# Patient Record
Sex: FEMALE | Race: WHITE | NOT HISPANIC OR LATINO | Employment: FULL TIME | URBAN - METROPOLITAN AREA
[De-identification: names, ages, dates, MRNs, and addresses within clinical notes are randomized per-mention and may not be internally consistent; named-entity substitution may affect disease eponyms.]

---

## 2017-04-11 ENCOUNTER — ALLSCRIPTS OFFICE VISIT (OUTPATIENT)
Dept: OTHER | Facility: OTHER | Age: 47
End: 2017-04-11

## 2017-04-11 DIAGNOSIS — Z12.31 ENCOUNTER FOR SCREENING MAMMOGRAM FOR MALIGNANT NEOPLASM OF BREAST: ICD-10-CM

## 2017-04-11 DIAGNOSIS — M22.2X2 PATELLOFEMORAL DISORDER OF LEFT KNEE: ICD-10-CM

## 2017-04-11 DIAGNOSIS — E78.5 HYPERLIPIDEMIA: ICD-10-CM

## 2017-04-11 DIAGNOSIS — M25.562 PAIN IN LEFT KNEE: ICD-10-CM

## 2017-04-27 ENCOUNTER — HOSPITAL ENCOUNTER (OUTPATIENT)
Dept: RADIOLOGY | Facility: OTHER | Age: 47
Discharge: HOME | End: 2017-04-27
Payer: COMMERCIAL

## 2017-04-27 ENCOUNTER — ALLSCRIPTS OFFICE VISIT (OUTPATIENT)
Dept: OTHER | Facility: OTHER | Age: 47
End: 2017-04-27

## 2017-04-27 DIAGNOSIS — M25.562 PAIN IN LEFT KNEE: ICD-10-CM

## 2017-04-27 PROCEDURE — 73562 X-RAY EXAM OF KNEE 3: CPT

## 2017-04-27 PROCEDURE — 73564 X-RAY EXAM KNEE 4 OR MORE: CPT

## 2017-05-15 ENCOUNTER — ALLSCRIPTS OFFICE VISIT (OUTPATIENT)
Dept: OTHER | Facility: OTHER | Age: 47
End: 2017-05-15

## 2017-05-15 DIAGNOSIS — Z12.31 ENCOUNTER FOR SCREENING MAMMOGRAM FOR MALIGNANT NEOPLASM OF BREAST: ICD-10-CM

## 2017-05-15 LAB
BILIRUB UR QL STRIP: NORMAL
CLARITY UR: NORMAL
COLOR UR: YELLOW
GLUCOSE (HISTORICAL): NORMAL
HGB UR QL STRIP.AUTO: NORMAL
KETONES UR STRIP-MCNC: NORMAL MG/DL
LEUKOCYTE ESTERASE UR QL STRIP: NORMAL
NITRITE UR QL STRIP: NORMAL
PH UR STRIP.AUTO: 6 [PH]
PROT UR STRIP-MCNC: NORMAL MG/DL
SP GR UR STRIP.AUTO: 1.01
UROBILINOGEN UR QL STRIP.AUTO: 0.2

## 2017-05-18 LAB
BACTERIA UR QL AUTO: ABNORMAL /HPF
HYALINE CASTS #/AREA URNS LPF: ABNORMAL /LPF
RBC (HISTORICAL): ABNORMAL /HPF
SQUAMOUS EPITHELIAL CELLS (HISTORICAL): ABNORMAL /HPF
WBC # BLD AUTO: ABNORMAL /HPF

## 2017-07-06 ENCOUNTER — HOSPITAL ENCOUNTER (OUTPATIENT)
Dept: MAMMOGRAPHY | Facility: CLINIC | Age: 47
Discharge: HOME | End: 2017-07-06
Payer: COMMERCIAL

## 2017-07-06 ENCOUNTER — TRANSCRIBE ORDERS (OUTPATIENT)
Dept: MAMMOGRAPHY | Facility: CLINIC | Age: 47
End: 2017-07-06

## 2017-07-06 DIAGNOSIS — Z12.31 ENCOUNTER FOR SCREENING MAMMOGRAM FOR MALIGNANT NEOPLASM OF BREAST: ICD-10-CM

## 2017-07-06 PROCEDURE — G0202 SCR MAMMO BI INCL CAD: HCPCS

## 2017-07-06 PROCEDURE — 77063 BREAST TOMOSYNTHESIS BI: CPT

## 2017-12-18 ENCOUNTER — ALLSCRIPTS OFFICE VISIT (OUTPATIENT)
Dept: OTHER | Facility: OTHER | Age: 47
End: 2017-12-18

## 2017-12-19 NOTE — PROGRESS NOTES
Assessment  1  Never a smoker   2  Acid indigestion (536 8) (K30)   3  Abdominal bloating (787 3) (R14 0)    Plan  Abdominal bloating    · Dicyclomine HCl - 20 MG Oral Tablet; TAKE 1 TABLET 4 TIMES DAILY    Discussion/Summary    Abdominal bloating - BRAT diet, increase Nexium to twice daily, restart Probiotic daily, try Bentyl 1 tab 4 x a day as needed  Call if no better  mammo due 07/06/2018PAP due 05/04/2018f/u as needed  Chief Complaint  Pt presents to the office with c/o stomach cramping, bloating, and belching, tried probiotic and BRAT w/o relief  mammo due 07/06/2018PAP due 05/04/2018      History of Present Illness  HPI: End of November has had burping and bloating and one day diarrhea, daughter had same only had diarrhea and vomiting  Daughter got better patient has not  Tried probiotic Align for 2 days with no relief, tried [employer] with no relief  Resumed normal diet, eating less but normal  This is first week of normal bowel movement other wise not right  Still on Nexium 20 mg, denies heart burn but feels like a lump in throat  Feels like if someone pushed on stomach would burp more  Not having a lot of gas  Getting a lot of cramping and pain, makes back hurt  Normal urination  Active Problems  1  Acid indigestion (536 8) (K30)   2  Acute UTI (599 0) (N39 0)   3  Allergic rhinitis (477 9) (J30 9)   4  Anemia (285 9) (D64 9)   5  Anxiety (300 00) (F41 9)   6  Cough (786 2) (R05)   7  Edema (782 3) (R60 9)   8  Elevated blood pressure reading without diagnosis of hypertension (796 2) (R03 0)   9  Encounter for routine gynecological examination (V72 31) (Z01 419)   10  Encounter for screening mammogram for malignant neoplasm of breast (V76 12)  (Z12 31)   11  Esophageal reflux (530 81) (K21 9)   12  Generalized anxiety disorder (300 02) (F41 1)   13  Hyperglycemia (790 29) (R73 9)   14  Hyperlipidemia (272 4) (E78 5)   15  Knee pain, left (719 46) (M25 562)   16   Left knee pain (719 46) (M25 562) 17  Obesity (278 00) (E66 9)   18  Patellofemoral syndrome, left (719 46) (M22 2X2)   19  Prediabetes (790 29) (R73 09)   20  Sinus congestion (478 19) (R09 81)   21  Urinary symptom or sign (788 99) (R39 9)   22  Vitamin D deficiency (268 9) (E55 9)    Past Medical History  1  History of Abnormal weight gain (783 1) (R63 5)   2  History of Acute upper respiratory infection (465 9) (J06 9)   3  History of Acute UTI (599 0) (N39 0)   4  History of Acute vaginitis (616 10) (N76 0)   5  History of Arthralgia (719 40) (M25 50)   6  History of Blepharitis, unspecified laterality   7  History of Bloating (787 3) (R14 0)   8  History of abdominal pain (V13 89) (Z87 898)   9  History of acute pharyngitis (V12 69) (Z87 09)   10  History of acute sinusitis (V12 69) (Z87 09)   11  History of acute sinusitis (V12 69) (Z87 09)   12  History of acute sinusitis (V12 69) (Z87 09)   13  History of conjunctivitis (V12 49) (Z86 69)   14  History of dysfunctional uterine bleeding (V13 29) (Z87 42)   15  History of glossitis (V12 79) (Z87 19)   16  History of low back pain (V13 59) (Z87 39)   17  History of stomatitis (V12 79) (Z87 19)   18  History of tinea corporis (V12 09) (Z86 19)   19  History of urticaria (V13 3) (Z87 2)   20  History of vaginitis (V13 29) (Z87 42)   21  History of viral gastroenteritis (V12 09) (Z86 19)   22  History of viral gastroenteritis (V12 09) (Z86 19)   23  History of viral infection (V12 09) (Z86 19)   24  History of Multiple joint pain (719 49) (M25 50)  Active Problems And Past Medical History Reviewed: The active problems and past medical history were reviewed and updated today  Family History  Mother    1  Family history of Family Health Status Of Mother - Good   2  Denied: Family history of substance abuse   3  Denied: Family history of Mental health problem  Father    4  Family history of Congestive Heart Failure   5  Family history of Coronary Artery Disease (V17 49)   6   Family history of Diabetes Mellitus (V18 0)   7  Denied: Family history of substance abuse   8  Denied: Family history of Mental health problem   9  Family history of Pure Hypercholesterolemia  Family History    10  Family history of malignant neoplasm (V16 9) (Z80 9)  Family History Reviewed: The family history was reviewed and updated today  Social History   · Alcohol Use (History)   · Social   · Caffeine Use   · Nominal   · Denied: History of Drug Use   · Never a smoker   · Uses Safety Equipment   · Smoke Detector   · Uses Safety Equipment - Seatbelts  The social history was reviewed and updated today  The social history was reviewed and is unchanged  Surgical History  1  History of Cholecystectomy Laparoscopic  Surgical History Reviewed: The surgical history was reviewed and updated today  Current Meds   1  [employer] Daily Multiple Oral Tablet; TAKE 1 TABLET DAILY; Therapy: (Recorded:20Yfv5369) to Recorded   2  Iron TABS; TAKE 1 TABLET DAILY AS DIRECTED; Therapy: (Recorded:98Jma2394) to Recorded   3  NexIUM 20 MG Oral Capsule Delayed Release; TAKE 1 CAPSULE ONCE DAILY; Therapy: (Recorded:84Tje6308) to Recorded   4  Sertraline HCl - 50 MG Oral Tablet; take one tablet by mouth every day; Therapy: 60DZT1816 to (Evaluate:21Lue5685)  Requested for: 60KXZ0201; Last Rx:03Dgz1010 Ordered   5  Vitamin D 1000 UNIT Oral Tablet; TAKE 1 TABLET 3 times daily TDD:3000; Therapy: (Recorded:01Vwe1230) to Recorded    The medication list was reviewed and updated today  Allergies  1  Codeine Derivatives   2  Penicillins   3  predniSONE   4  Sulfa Drugs   5   Tetracyclines    Vitals   Recorded: 46NFN9445 12:19PM   Temperature 98 4 F, Oral   Heart Rate 72, L Radial   Pulse Quality Normal, L Radial   Respiration Quality Normal   Respiration 16   Systolic 577, LUE, Sitting   Diastolic 82, LUE, Sitting   Height 5 ft 3 75 in   Weight 204 lb 12 8 oz   BMI Calculated 35 43   BSA Calculated 1 97     Physical Exam   Constitutional  General appearance: No acute distress, well appearing and well nourished  Pulmonary  Respiratory effort: No increased work of breathing or signs of respiratory distress  Auscultation of lungs: Clear to auscultation  Cardiovascular  Palpation of heart: Normal PMI, no thrills  Auscultation of heart: Normal rate and rhythm, normal S1 and S2, without murmurs  Abdomen  Abdomen: Non-tender, no masses  Liver and spleen: No hepatomegaly or splenomegaly  Psychiatric  Orientation to person, place, and time: Normal    Mood and affect: Normal          Results/Data  PHQ-2 Adult Depression Screening 96Quu4340 12:22PM User, [facility]     Test Name Result Flag Reference   PHQ-2 Adult Depression Score 0     Over the last two weeks, how often have you been bothered by any of the following problems?  Little interest or pleasure in doing things: Not at all - 0 Feeling down, depressed, or hopeless: Not at all - 0   PHQ-2 Adult Depression Screening Negative         Signatures   Electronically signed by : Severo Funk, [facility]; Dec 18 2017 12:46PM EST                       (Author)    Electronically signed by : ANGELY Gong ; Dec 18 2017  1:07PM EST                       (Author)

## 2018-01-10 NOTE — MISCELLANEOUS
Message   Recorded as Task   Date: 02/15/2016 08:25 AM, Created By: Kyara Renee   Task Name: Follow Up   Assigned To: Melly Peralta   Regarding Patient: Mark Clemens, Status: In Progress   CommentDes Daniel - 15 Feb 2016 8:25 AM     TASK CREATED  Caller: Self; Other; [phone] (Mobile Phone); [phone] (Work)  pt lm stated she saw Ric Foreman back in dec for irreg bleeding and was told it was poss due to homer yarely- she was to call if she was still having issues- she skipped a cycle in dec and now has been bleeding for 16 day    please advise ( call work)  after 4 please call the [address] - 15 Feb 2016 8:51 AM     TASK IN [facility] [address],[facility]  [address] - 15 Feb 2016 9:00 AM     TASK EDITED  Pt aware you are off today - prefers to wait for your input tomorrow  Her eb 2 mos ago neg  No menses in Dec and now been bleeding off and on for 16 days  She is anemic( per pt) and takes fe  She uses about 3 pads a day  Aware she won't be called till tomorrow   Nabil Crow - 16 Feb 2016 9:49 AM     TASK EDITED  Pt works at [employer]  She works till noon  If you call after noon - call on cell   Melly Peralta - 16 Feb 2016 1:01 PM     TASK EDITED  Pt is bleeding for her 17th day  She states it is red but not heavy  She had an EB that was nl and a nl u/s  She is willing to try progesterone to see if her bleeding will stop  She is aware of her other options  She can make a follow up appt if progesterone is not working for her  She is taking her Fe  Pt would like to see what happens next month          Plan  Dysfunctional uterine bleeding    · Norethindrone Acetate 5 MG Oral Tablet (Aygestin); q 8hr x 3 days, one tab po q 12  hr x 3 days then 1tab po x 3 days    Signatures   Electronically signed by : Laney Libman; Feb 16 2016  1:02PM EST                       (Author)

## 2018-01-12 VITALS
SYSTOLIC BLOOD PRESSURE: 120 MMHG | HEIGHT: 64 IN | RESPIRATION RATE: 16 BRPM | HEART RATE: 64 BPM | DIASTOLIC BLOOD PRESSURE: 78 MMHG | WEIGHT: 203.8 LBS | BODY MASS INDEX: 34.79 KG/M2

## 2018-01-12 NOTE — PROGRESS NOTES
Assessment    1  Acid indigestion (536 8) (K30)   2  Anemia (285 9) (D64 9)   3  Anxiety (300 00) (F41 9)   4  Dysfunctional uterine bleeding (626 8) (N93 8)   5  Elevated blood pressure reading without diagnosis of hypertension (796 2) (R03 0)   6  Prediabetes (790 29) (R73 09)   7  Vitamin D deficiency (268 9) (E55 9)   8  Encounter for routine gynecological examination (V72 31) (Z01 419)    Plan  Anemia    · (1) CBC/PLT/DIFF; Status:Active; Requested RFQ:19HKJ8035; Anxiety    · Sertraline HCl - 50 MG Oral Tablet; take 1 tablet every day  Dysfunctional uterine bleeding    · (1) TSH WITH FT4 REFLEX; Status:Active; Requested for:04May2016;    · (1) TSH WITH FT4 REFLEX; Status:Canceled;   Edema    · Hydrochlorothiazide 12 5 MG Oral Capsule; TAKE 1 CAPSULE BY MOUTH  DAILY AS NEEDED  Prediabetes    · (1) COMPREHENSIVE METABOLIC PANEL; Status:Active; Requested for:04May2016;    · (1) HEMOGLOBIN A1C; Status:Active; Requested KSN:37GAU3723;    · Follow Up in 2 Years Evaluation and Treatment  Follow-up  Status: Complete  Done:  99JKL3603   · Follow-up PRN Evaluation and Treatment  Follow-up  Status: Complete  Done:  02ZML5968  Vitamin D deficiency    · (1) VITAMIN D 25-HYDROXY; Status:Active; Requested for:04May2016;     Discussion/Summary  health maintenance visit Currently, she eats an adequate diet and has an inadequate exercise regimen  the risks and benefits of cervical cancer screening were discussed Pap test with reflex HPV testing was done today Breast cancer screening: the risks and benefits of breast cancer screening were discussed, self breast exam technique was taught, monthly self breast exam was advised and mammogram is current  Colorectal cancer screening: the risks and benefits of colorectal cancer screening were discussed and colorectal cancer screening is not indicated  The risks and benefits of immunizations were discussed   Advice and education were given regarding nutrition, aerobic exercise, weight bearing exercise, weight loss, calcium supplements and vitamin D supplements  Patient discussion: discussed with the patient  1  gyn exam - PAP done today, if normal will repeat in 2 years  2  dysfunctional uterine bleeding - endometrial biopsy normal 12/2015, perimenopausal    3  anxiety - try increasing zoloft to 50 mg once daily  4  anemia - check CBC, not currently on iron    5  vitamin d def - compliance with vitamin d stressed for a few months, then get labs done    6  heart burn - c/w OTC Nexium prn    7  prediabetes - will check A1C, REALLY needs to stick to weight loss program    8  elevated blood pressure - most likely white coat HTN, resume monitoring at home, check 3 x a week, if consistently stays above 140/90 need to follow up here sooner  Encouraged weight loss, healthy diet, regular exercise    mammo due 5/2017    f/u pending labs  f/u as needed       Chief Complaint  Pt presents here for a pap/pe;    pap due 01/14/2016   mammo due 05/03/2017      History of Present Illness  HM, Adult Female: The patient is being seen for a gynecology evaluation  The last health maintenance visit was 2 year(s) ago  General Health: The patient's health since the last visit is described as good  She has regular dental visits  She denies vision problems  She denies hearing loss  Lifestyle:  She consumes a diverse and healthy diet  She has weight concerns  She does not exercise regularly  She does not use tobacco  She consumes alcohol  She denies drug use  Reproductive health: the patient is perimenopausal   she reports normal menses  Menstrual history:  age at menarche was 5  LMP: the last menstrual period was 03/28/2016  Recent menstrual periods: bleeding has been lighter than normal  The cycles are irregular and occur approximately every 60 days  The duration of her recent periods has been irregular and usually last 30 days  she uses contraception   For contraception, she has a partner with a vasectomy  she is sexually active  pregnancy history: G 2P 2  Screening:   HPI: Patient here for PAP  Last July has begun perimenopausal  Period every other month, spotting some months only  Was concerned about this and saw [facility] at [facility] who did a endometrial biopsy and was told she was in perimenopause  Offered OCP or could even consider hysterectomy, patient prefers to deal with it  Has also been more anxious all these changes  DAughter is going to be a senior and looking at college, makes her more anxious also  Tried weight watchers, lost 7 lbs in 3 weeks then gave up  Has no reason for giving up  Monitors BP at home always gets 120/70s  Review of Systems    Constitutional: No fever, no chills, feels well, no tiredness, no recent weight gain or weight loss  Eyes: No complaints of eye pain, no red eyes, no eyesight problems, no discharge, no dry eyes, no itching of eyes  ENT: no complaints of earache, no loss of hearing, no nose bleeds, no nasal discharge, no sore throat, no hoarseness  Cardiovascular: No complaints of slow heart rate, no fast heart rate, no chest pain, no palpitations, no leg claudication, no lower extremity edema  Respiratory: No complaints of shortness of breath, no wheezing, no cough, no SOB on exertion, no orthopnea, no PND  Gastrointestinal: No complaints of abdominal pain, no constipation, no nausea or vomiting, no diarrhea, no bloody stools  Genitourinary: as noted in HPI  Musculoskeletal: No complaints of arthralgias, no myalgias, no joint swelling or stiffness, no limb pain or swelling  Integumentary: No complaints of skin rash or lesions, no itching, no skin wounds, no breast pain or lump  Neurological: No complaints of headache, no confusion, no convulsions, no numbness, no dizziness or fainting, no tingling, no limb weakness, no difficulty walking     Psychiatric: Not suicidal, no sleep disturbance, no anxiety or depression, no change in personality, no emotional problems  Endocrine: No complaints of proptosis, no hot flashes, no muscle weakness, no deepening of the voice, no feelings of weakness  Hematologic/Lymphatic: No complaints of swollen glands, no swollen glands in the neck, does not bleed easily, does not bruise easily  Active Problems    1  Acid indigestion (536 8) (K30)   2  Allergic rhinitis (477 9) (J30 9)   3  Anemia (285 9) (D64 9)   4  Anxiety (300 00) (F41 9)   5  Dysfunctional uterine bleeding (626 8) (N93 8)   6  Edema (782 3) (R60 9)   7  Elevated blood pressure reading without diagnosis of hypertension (796 2) (R03 0)   8  Encounter for routine gynecological examination (V72 31) (Z01 419)   9  Encounter for screening mammogram for malignant neoplasm of breast (V76 12)   (Z12 31)   10  Esophageal reflux (530 81) (K21 9)   11  Generalized anxiety disorder (300 02) (F41 1)   12  Hyperglycemia (790 29) (R73 9)   13  Hyperlipidemia (272 4) (E78 5)   14  Obesity (278 00) (E66 9)   15  Prediabetes (790 29) (R73 09)   16   Vitamin D deficiency (268 9) (E55 9)    Past Medical History    · History of Abnormal weight gain (783 1) (R63 5)   · History of Acute upper respiratory infection (465 9) (J06 9)   · History of Acute UTI (599 0) (N39 0)   · History of Arthralgia (719 40) (M25 50)   · History of Blepharitis, unspecified laterality (373 00) (H01 009)   · History of Bloating (787 3) (R14 0)   · History of Cough (786 2) (R05)   · History of abdominal pain (V13 89) (S43 386)   · History of acute pharyngitis (V12 69) (Z87 09)   · History of acute sinusitis (V12 69) (Z87 09)   · History of acute sinusitis (V12 69) (Z87 09)   · History of acute sinusitis (V12 69) (Z87 09)   · History of conjunctivitis (V12 49) (Z86 69)   · History of glossitis (V12 79) (Z87 19)   · History of low back pain (V13 59) (Z87 39)   · History of stomatitis (V12 79) (Z87 19)   · History of tinea corporis (V12 09) (Z86 19)   · History of urticaria (V13 3) (Z87 2)   · History of vaginitis (V13 29) (Z87 42)   · History of viral gastroenteritis (V12 09) (Z86 19)   · History of viral gastroenteritis (V12 09) (Z86 19)   · History of viral infection (V12 09) (Z86 19)   · History of Multiple joint pain (719 49) (M25 50)   · History of Urinary symptom or sign (788 99) (R39 9)    Surgical History    · History of Cholecystectomy Laparoscopic    Family History  Mother    · Family history of Family Health Status Of Mother - Good  Father    · Family history of Congestive Heart Failure   · Family history of Coronary Artery Disease (V17 49)   · Family history of Diabetes Mellitus (V18 0)   · Family history of Hypercholesterolemia    Social History    · Alcohol Use (History)   · Social   · Caffeine Use   · Nominal   · Denied: History of Drug Use   · Never a smoker   · Uses Safety Equipment   · Smoke Detector   · Uses Safety Equipment - Seatbelts    Current Meds   1  Ferrous Sulfate TABS; Take 1 tablet daily; Therapy: (Recorded:07Mar2014) to Recorded   2  Hydrochlorothiazide 12 5 MG Oral Capsule; TAKE 1 CAPSULE BY MOUTH DAILY AS   NEEDED; Therapy: 79XAB8404 to (Last Rx:02Oct2015)  Requested for: 61Rjc4216 Ordered   3  NexIUM 40 MG Oral Capsule Delayed Release; TAKE 1 CAPSULE DAILY; Therapy: 62AFF4252 to (Evaluate:46Nix8036)  Requested for: 11XSK5717; Last   BC:75UAE9680 Ordered   4  Sertraline HCl - 50 MG Oral Tablet; take 1 tablet every day  Requested for: 23Nov2015; Last Rx:23Nov2015 Ordered   5  Vitamin D 1000 UNIT Oral Tablet; TAKE 1 TABLET 3 times daily TDD:3000; Therapy: (Recorded:07Mar2014) to Recorded    Allergies    1  Codeine Derivatives   2  Penicillins   3  Sulfa Drugs   4   Tetracyclines    Vitals   Recorded: 47VDD9262 12:00PM Recorded: 91SWZ0050 10:27AM   Heart Rate  76   Respiration  16   Systolic 885 690   Diastolic 90 98   Height  5 ft 2 5 in   Weight  203 lb    BMI Calculated  36 54   BSA Calculated  1 93   LMP  47HCZ5942     Physical Exam    Constitutional General appearance: No acute distress, well appearing and well nourished  Neck   Neck: Supple, symmetric, trachea midline, no masses  Thyroid: Normal, no thyromegaly  Pulmonary   Respiratory effort: No increased work of breathing or signs of respiratory distress  Percussion of chest: Normal     Palpation of chest: Normal     Auscultation of lungs: Clear to auscultation  Cardiovascular   Palpation of heart: Normal PMI, no thrills  Auscultation of heart: Normal rate and rhythm, normal S1 and S2, no murmurs  Femoral pulses: 2+ bilaterally  Pedal pulses: 2+ bilaterally  Examination of extremities for edema and/or varicosities: Normal     Chest   Breasts: Normal, no dimpling or skin changes appreciated  Palpation of breasts and axillae: Normal, no masses palpated  Chest: Normal     Abdomen   Abdomen: Non-tender, no masses  Liver and spleen: No hepatomegaly or splenomegaly  Genitourinary   External genitalia and vagina: Normal, no lesions appreciated  Cervix: Normal, no lesions, Pap obtained  Uterus: Normal size, no tenderness, no masses  Adnexa/Parametria: Normal, no masses or tenderness  Lymphatic   Palpation of lymph nodes in neck: No lymphadenopathy  Palpation of lymph nodes in axillae: No lymphadenopathy  Musculoskeletal   Gait and station: Normal     Digits and nails: Normal without clubbing or cyanosis  Joints, bones, and muscles: Normal     Range of motion: Normal     Stability: Normal     Muscle strength/tone: Normal     Skin   Skin and subcutaneous tissue: Normal without rashes or lesions  Palpation of skin and subcutaneous tissue: Normal turgor  Neurologic   Cranial nerves: Cranial nerves II-XII intact  Reflexes: 2+ and symmetric  Psychiatric   Judgment and insight: Normal     Mood and affect: Normal        Health Management  Encounter for routine gynecological examination   (every) THINPREP PAP RFX HR HPV; every 2 years;  Last 66TJD8674; Next Due:  47KDT5004; Overdue  Encounter for screening mammogram for malignant neoplasm of breast   Digital Bilateral Screening Mammogram With CAD; every 1 year; Last 03DWD0452; Next  Due: 10SOA1081;  Overdue    Signatures   Electronically signed by : Taras Nino, [facility]; May  4 2016 12:01PM EST                       (Author)    Electronically signed by : ANGELY Mercado ; May  4 2016 12:36PM EST                       (Author)

## 2018-01-14 VITALS
WEIGHT: 204.38 LBS | BODY MASS INDEX: 35.08 KG/M2 | SYSTOLIC BLOOD PRESSURE: 172 MMHG | HEART RATE: 94 BPM | DIASTOLIC BLOOD PRESSURE: 105 MMHG

## 2018-01-14 VITALS
HEIGHT: 64 IN | BODY MASS INDEX: 35.17 KG/M2 | SYSTOLIC BLOOD PRESSURE: 132 MMHG | HEART RATE: 84 BPM | WEIGHT: 206 LBS | RESPIRATION RATE: 18 BRPM | DIASTOLIC BLOOD PRESSURE: 84 MMHG | TEMPERATURE: 97.7 F

## 2018-01-15 NOTE — MISCELLANEOUS
Message   Recorded as Task   Date: 06/06/2016 08:53 AM, Created By: Lilly Martinez   Task Name: Medical Complaint Callback   Assigned To: Brijesh Neri   Regarding Patient: Juany Aura, Status: In Progress   Comment:    Sharon Perez - 06 Jun 2016 8:53 AM     TASK CREATED  Caller: Self; Medical Complaint; [phone] (Work)  pt needs to discuss her treatment with the Norenthindrone 5 mg ordered by Elena Tolliver  she is [phone]  [facility] - 06 Jun 2016 9:38 AM     TASK IN PROGRESS   Errol Rodrigueznadia - 06 Jun 2016 10:01 AM     TASK EDITED  lmtcb  In Feb, pt was put on norethindrone, 5, tid x 3, bid x 3 and 1 od x3  Errol Toledo - 06 Jun 2016 10:36 AM     TASK EDITED  Pt never took the norethindrone in Feb  Bleeding stopped on its own  Pt began bleeding again few weeks ago so filled rx and took it tid x 3 and bid x 3 and then just stopped it about the 25th  Pt unaware bleeding would restart ff norethindrone  She began bleeding Sat - using about 5 pads a day  Her normal menses lasts about 8 days so if pt does not stop bleeding by this Sat - to cb  Active Problems    1  Acid indigestion (536 8) (K30)   2  Allergic rhinitis (477 9) (J30 9)   3  Anemia (285 9) (D64 9)   4  Anxiety (300 00) (F41 9)   5  Dysfunctional uterine bleeding (626 8) (N93 8)   6  Edema (782 3) (R60 9)   7  Elevated blood pressure reading without diagnosis of hypertension (796 2) (R03 0)   8  Encounter for routine gynecological examination (V72 31) (Z01 419)   9  Encounter for screening mammogram for malignant neoplasm of breast (V76 12)   (Z12 31)   10  Esophageal reflux (530 81) (K21 9)   11  Generalized anxiety disorder (300 02) (F41 1)   12  Hyperglycemia (790 29) (R73 9)   13  Hyperlipidemia (272 4) (E78 5)   14  Obesity (278 00) (E66 9)   15  Prediabetes (790 29) (R73 09)   16  Vitamin D deficiency (268 9) (E55 9)    Current Meds   1  Ferrous Sulfate TABS; Take 1 tablet daily; Therapy: (Recorded:07Mar2014) to Recorded   2  Hydrochlorothiazide 12 5 MG Oral Capsule; TAKE 1 CAPSULE BY MOUTH DAILY AS   NEEDED; Therapy: 82EMA8304 to (Last FU:12GSF5996)  Requested for: 35QCC5099 Ordered   3  NexIUM 40 MG Oral Capsule Delayed Release (Esomeprazole Magnesium); TAKE 1   CAPSULE DAILY; Therapy: 87XQN6375 to (Evaluate:72Ake5671)  Requested for: 72VDI1323; Last   PB:62LNA3282 Ordered   4  Sertraline HCl - 50 MG Oral Tablet; take 1 tablet every day  Requested for: 76AYP4650;   Last CO:40UHE0100 Ordered   5  Vitamin D 1000 UNIT Oral Tablet; TAKE 1 TABLET 3 times daily TDD:3000; Therapy: (Recorded:07Mar2014) to Recorded    Allergies    1  Codeine Derivatives   2  Penicillins   3  Sulfa Drugs   4  Tetracyclines    Signatures   Electronically signed by :  Donn Houser, ; Jun 6 2016 10:36AM EST                       (Author)

## 2018-01-23 VITALS
BODY MASS INDEX: 34.97 KG/M2 | WEIGHT: 204.8 LBS | SYSTOLIC BLOOD PRESSURE: 138 MMHG | HEART RATE: 72 BPM | DIASTOLIC BLOOD PRESSURE: 82 MMHG | HEIGHT: 64 IN | TEMPERATURE: 98.4 F | RESPIRATION RATE: 16 BRPM

## 2018-02-01 ENCOUNTER — OFFICE VISIT (OUTPATIENT)
Dept: FAMILY MEDICINE CLINIC | Facility: CLINIC | Age: 48
End: 2018-02-01
Payer: COMMERCIAL

## 2018-02-01 VITALS
DIASTOLIC BLOOD PRESSURE: 80 MMHG | WEIGHT: 210 LBS | TEMPERATURE: 97.8 F | SYSTOLIC BLOOD PRESSURE: 120 MMHG | HEART RATE: 72 BPM | BODY MASS INDEX: 36.33 KG/M2 | RESPIRATION RATE: 16 BRPM

## 2018-02-01 DIAGNOSIS — R09.81 SINUS CONGESTION: Primary | ICD-10-CM

## 2018-02-01 PROCEDURE — 99214 OFFICE O/P EST MOD 30 MIN: CPT | Performed by: FAMILY MEDICINE

## 2018-02-01 RX ORDER — AZITHROMYCIN 250 MG/1
TABLET, FILM COATED ORAL
Qty: 6 TABLET | Refills: 0 | Status: SHIPPED | OUTPATIENT
Start: 2018-02-01 | End: 2018-02-06

## 2018-02-01 RX ORDER — NITROFURANTOIN 25; 75 MG/1; MG/1
1 CAPSULE ORAL EVERY 12 HOURS
COMMUNITY
Start: 2015-02-27 | End: 2018-09-20 | Stop reason: ALTCHOICE

## 2018-02-01 RX ORDER — DICYCLOMINE HCL 20 MG
1 TABLET ORAL 4 TIMES DAILY
COMMUNITY
Start: 2017-12-18 | End: 2018-09-20 | Stop reason: ALTCHOICE

## 2018-02-01 RX ORDER — PNV NO.95/FERROUS FUM/FOLIC AC 28MG-0.8MG
1 TABLET ORAL DAILY
COMMUNITY
End: 2018-09-20 | Stop reason: ALTCHOICE

## 2018-02-01 NOTE — PATIENT INSTRUCTIONS
Sinus congestion  For patient's sinus congestion because she cannot take prednisone without having severe side effects and because Synthroid why send seems to work every year we will give her that especially since she is going away  If this does not work we will need to do imaging study such as a sinus x-ray before doing anything else    In the meantime I still would like her to use the Neti pot followed by the Flonase daily until she is clear

## 2018-02-01 NOTE — ASSESSMENT & PLAN NOTE
For patient's sinus congestion because she cannot take prednisone without having severe side effects and because Synthroid why send seems to work every year we will give her that especially since she is going away  If this does not work we will need to do imaging study such as a sinus x-ray before doing anything else  In the meantime I still would like her to use the Neti pot followed by the Flonase daily until she is clear

## 2018-02-01 NOTE — PROGRESS NOTES
Assessment/Plan:    Sinus congestion  For patient's sinus congestion because she cannot take prednisone without having severe side effects and because Synthroid why send seems to work every year we will give her that especially since she is going away  If this does not work we will need to do imaging study such as a sinus x-ray before doing anything else  In the meantime I still would like her to use the Neti pot followed by the Flonase daily until she is clear  Eddye Ahmet was seen today for sinusitis      Diagnoses and all orders for this visit:    Sinus congestion  -     azithromycin (ZITHROMAX) 250 mg tablet; 2 pills day 1 then 1 pill daily for a total of 5 days        Subjective:     HPI      Constitutional  No fatigue, No weight loss, No weight gain, No fever, No chills    Respiratory  No dyspnea, No cough, No hemoptysis, No wheezing, No pleuritic pain    Cardiovascular  No chest pain, No palpitations, No arrhythmia, No orthopnea, No nocturnal dyspnea, No edema, No claudication    Breasts (R,L)  No discharge from nipple, No breast tenderness, No breast mass    Gastrointestinal  No loss of appetite, No dysphagia, No abdominal pain, No nausea, No vomiting, No change in bowel habits, No diarrhea, No constipation, No blood in stool    Genitourinary, Female  No increased frequency of urination, No dysuria, No hematuria, No nocturia, No urinary incontinence, No vaginal discharge, No abnormal vaginal bleeding, No pelvic pain, No menstrual problem, No menopausal problem    Neurologic  No headache, No dizziness, No lightheadedness, No syncope, No vertigo, No weakness, No numbness, No paresthesia, No tremor    Psychiatric  No difficulty sleeping, No mood swings, Not feeling anxious, Not feeling depressed, No confusion, No memory loss    Muscular skeletal  Myalgias, arthralgias, no joint swelling or stiffness, no limb pain or swelling    Heme  No swollen glands, no easy bruising    No past medical history on file   Social History   Substance Use Topics    Smoking status: Never Smoker    Smokeless tobacco: Never Used    Alcohol use 0 6 oz/week     1 Glasses of wine per week      Comment: Social     Family History   Problem Relation Age of Onset    Diabetes Mother     Heart failure Father     Coronary artery disease Father     Diabetes Father     Pancreatic cancer Paternal Grandmother     Mental illness Neg Hx     Substance Abuse Neg Hx        Current Outpatient Prescriptions   Medication Sig Dispense Refill    dicyclomine (BENTYL) 20 mg tablet Take 1 tablet by mouth 4 (four) times a day      nitrofurantoin (MACROBID) 100 mg capsule Take 1 capsule by mouth every 12 (twelve) hours      sertraline (ZOLOFT) 50 mg tablet       azithromycin (ZITHROMAX) 250 mg tablet 2 pills day 1 then 1 pill daily for a total of 5 days 6 tablet 0    cholecalciferol (VITAMIN D3) 1,000 units tablet Take by mouth every 8 (eight) hours      esomeprazole (NEXIUM) 20 mg capsule Take 1 capsule by mouth daily      Ferrous Sulfate (IRON) 325 (65 Fe) MG TABS Take 1 tablet by mouth daily      Multiple Vitamin ([facility] DAILY MULTIPLE PO) Take 1 tablet by mouth daily       No current facility-administered medications for this visit  Objective:    Vitals:    02/01/18 1145   BP: 120/80   Pulse: 72   Resp: 16   Temp: 97 8 °F (36 6 °C)     Body mass index is 36 33 kg/m²       Constitutional  Appears well, Looks well, Appearance consistent with age    Mental Status  Alert, Cooperative, oriented to time person and place, recent and remote memory intact, mood and affect normal , patient is reasonable    Neck  No neck mass, No thyromegaly, No thyroid nodule, No thyroid tenderness    Respiratory  Respiratory effort normal, Breath sounds normal, No rales, No rhonchi, No wheezing     Cardiac  Heart rate normal, Heart rhythm normal, Heart sounds normal, No heart murmur    Vascular  Carotid pulse normal, No carotid bruit, No varicose veins, No leg edema    Breasts (R,L)  No discharge from nipple, No breast tenderness, No breast mass, No nipple retraction, No skin changes    Gastrointestinal  Bowel sounds normal, Abdomen soft, No hepatomegaly, No splenomegaly, No abdominal tenderness, No abdominal mass, No hernia, No hemorrhoids    Genitourinary, Female  Vulva normal, No erythema of urethra    Genitourinary, Female  Vulva normal, No erythema of vulva, Vaginal mucosa normal, No vaginal discharge, No lesion of urethra, No urethral discharge, No bladder distention, No bladder tenderness, Cervix normal, No cervical inflammation, No cervical lesion, No cervical discharge, No cervical tenderness, Uterus normal size, No uterine tenderness, No adnexal tenderness, No adnexal mass, No pelvic mass    Lymphatics  No axillary lymphadenopathy, No inguinal lymphadenopathy    Skin  Pattern of hair growth normal, No skin rash, No abnormal skin lesion, No acne

## 2018-04-06 ENCOUNTER — OFFICE VISIT (OUTPATIENT)
Dept: FAMILY MEDICINE CLINIC | Facility: CLINIC | Age: 48
End: 2018-04-06
Payer: COMMERCIAL

## 2018-04-06 ENCOUNTER — HOSPITAL ENCOUNTER (OUTPATIENT)
Dept: ULTRASOUND IMAGING | Facility: CLINIC | Age: 48
Discharge: HOME | End: 2018-04-06
Payer: COMMERCIAL

## 2018-04-06 VITALS
BODY MASS INDEX: 35.61 KG/M2 | DIASTOLIC BLOOD PRESSURE: 84 MMHG | SYSTOLIC BLOOD PRESSURE: 166 MMHG | HEIGHT: 64 IN | HEART RATE: 68 BPM | WEIGHT: 208.6 LBS | RESPIRATION RATE: 14 BRPM

## 2018-04-06 DIAGNOSIS — N63.10 MASS OF BREAST, RIGHT: ICD-10-CM

## 2018-04-06 DIAGNOSIS — N63.10 MASS OF BREAST, RIGHT: Primary | ICD-10-CM

## 2018-04-06 PROCEDURE — 99214 OFFICE O/P EST MOD 30 MIN: CPT | Performed by: PHYSICIAN ASSISTANT

## 2018-04-06 PROCEDURE — 76642 ULTRASOUND BREAST LIMITED: CPT

## 2018-04-06 RX ORDER — CEPHALEXIN 500 MG/1
500 CAPSULE ORAL EVERY 6 HOURS SCHEDULED
Qty: 28 CAPSULE | Refills: 0 | Status: SHIPPED | OUTPATIENT
Start: 2018-04-06 | End: 2018-04-13

## 2018-04-06 NOTE — PROGRESS NOTES
Assessment/Plan:    1  Mass of breast, right    - most likely folliculitis, will do ultrasound for reassurance, warm compresses 3 x day, start keflex 1 tab 4 x a day for 7 days   - US breast right limited (diagnostic); Future  - cephalexin (KEFLEX) 500 mg capsule; Take 1 capsule (500 mg total) by mouth every 6 (six) hours for 7 days  Dispense: 28 capsule; Refill: 0    F/u as needed  F/u for yearly physical this summer    Subjective:   Chief Complaint   Patient presents with    Breast Mass     Left breast with red pimple like spot over it      Patient ID: Sugar Mcginnis is a 49 y o  female  Patient with irregular periods, had her period in January, then none Feb and march now has her period again  Ever since this irregularity started patient has been more anxious and skin has been breaking out  Earlier in the week noticed a  red bump on right breast  Thought is started as a bump with a ring around it now just a firm bump with a slight pinkish coloring to skin  Does not hurt  Staying the same in size  Pea size  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  Normal 3D mammo 7/2017  The following portions of the patient's history were reviewed and updated as appropriate: allergies, current medications, past family history, past medical history, past social history, past surgical history and problem list     No past medical history on file  Past Surgical History:   Procedure Laterality Date    CHOLECYSTECTOMY LAPAROSCOPIC       Family History   Problem Relation Age of Onset    Diabetes Mother     Heart failure Father     Coronary artery disease Father     Diabetes Father     Pancreatic cancer Paternal Grandmother     Mental illness Neg Hx     Substance Abuse Neg Hx      Social History     Social History    Marital status: /Civil Union     Spouse name: N/A    Number of children: N/A    Years of education: N/A     Occupational History    Not on file       Social History Main Topics  Smoking status: Never Smoker    Smokeless tobacco: Never Used    Alcohol use 0 6 oz/week     1 Glasses of wine per week      Comment: Social    Drug use: No    Sexual activity: Not on file     Other Topics Concern    Not on file     Social History Narrative    No narrative on file       Current Outpatient Prescriptions:     cholecalciferol (VITAMIN D3) 1,000 units tablet, Take by mouth every 8 (eight) hours, Disp: , Rfl:     dicyclomine (BENTYL) 20 mg tablet, Take 1 tablet by mouth 4 (four) times a day, Disp: , Rfl:     esomeprazole (NEXIUM) 20 mg capsule, Take 1 capsule by mouth daily, Disp: , Rfl:     Ferrous Sulfate (IRON) 325 (65 Fe) MG TABS, Take 1 tablet by mouth daily, Disp: , Rfl:     Multiple Vitamin ([facility] DAILY MULTIPLE PO), Take 1 tablet by mouth daily, Disp: , Rfl:     nitrofurantoin (MACROBID) 100 mg capsule, Take 1 capsule by mouth every 12 (twelve) hours, Disp: , Rfl:     sertraline (ZOLOFT) 50 mg tablet, , Disp: , Rfl:     Review of Systems          Objective:    Vitals:    04/06/18 0916   BP: 166/84   BP Location: Left arm   Patient Position: Sitting   Cuff Size: Standard   Pulse: 68   Resp: 14   Weight: 94 6 kg (208 lb 9 6 oz)   Height: 5' 4" (1 626 m)        Physical Exam   Constitutional: She is oriented to person, place, and time  She appears well-developed and well-nourished  Cardiovascular: Normal rate, regular rhythm and normal heart sounds  Pulmonary/Chest: Effort normal and breath sounds normal    Genitourinary: No breast tenderness  Genitourinary Comments: Right breast at 5 o'clock with 3 mm superficial irregular feeling mass, nontender, slight pinkish color to skin no warmth no induration  No axillary nodes   Neurological: She is alert and oriented to person, place, and time     Psychiatric:   Anxious concerned

## 2018-05-02 ENCOUNTER — TRANSCRIBE ORDERS (OUTPATIENT)
Dept: MAMMOGRAPHY | Facility: CLINIC | Age: 48
End: 2018-05-02

## 2018-05-02 DIAGNOSIS — R92.8 ABNORMAL MAMMOGRAM: Primary | ICD-10-CM

## 2018-05-25 ENCOUNTER — TELEPHONE (OUTPATIENT)
Dept: FAMILY MEDICINE CLINIC | Facility: CLINIC | Age: 48
End: 2018-05-25

## 2018-05-25 NOTE — TELEPHONE ENCOUNTER
I am sorry it is Ronn Bean and our office policy that we cannot prescribe antibiotics without seeing the patient  Sorry

## 2018-05-25 NOTE — TELEPHONE ENCOUNTER
Patient is asking if she can have a z abilio  She will be flying this weekend for a vacation and can't get off from work  She is stuffy has ears cracking and colored mucus  Can dorys call something in for her?

## 2018-08-03 DIAGNOSIS — F41.8 DEPRESSION WITH ANXIETY: Primary | ICD-10-CM

## 2018-09-20 ENCOUNTER — OFFICE VISIT (OUTPATIENT)
Dept: FAMILY MEDICINE CLINIC | Facility: CLINIC | Age: 48
End: 2018-09-20
Payer: COMMERCIAL

## 2018-09-20 VITALS
BODY MASS INDEX: 36.19 KG/M2 | TEMPERATURE: 98 F | RESPIRATION RATE: 16 BRPM | DIASTOLIC BLOOD PRESSURE: 88 MMHG | HEIGHT: 64 IN | WEIGHT: 212 LBS | SYSTOLIC BLOOD PRESSURE: 130 MMHG | HEART RATE: 80 BPM

## 2018-09-20 DIAGNOSIS — S29.012A STRAIN OF THORACIC PARASPINAL MUSCLES EXCLUDING T1 AND T2 LEVELS, INITIAL ENCOUNTER: Primary | ICD-10-CM

## 2018-09-20 DIAGNOSIS — B02.9 HERPES ZOSTER WITHOUT COMPLICATION: ICD-10-CM

## 2018-09-20 PROCEDURE — 99214 OFFICE O/P EST MOD 30 MIN: CPT | Performed by: PHYSICIAN ASSISTANT

## 2018-09-20 PROCEDURE — 1036F TOBACCO NON-USER: CPT | Performed by: PHYSICIAN ASSISTANT

## 2018-09-20 PROCEDURE — 3008F BODY MASS INDEX DOCD: CPT | Performed by: PHYSICIAN ASSISTANT

## 2018-09-20 RX ORDER — VALACYCLOVIR HYDROCHLORIDE 1 G/1
1000 TABLET, FILM COATED ORAL
COMMUNITY
Start: 2018-09-15 | End: 2019-08-27 | Stop reason: ALTCHOICE

## 2018-09-20 RX ORDER — CYCLOBENZAPRINE HCL 10 MG
10 TABLET ORAL 3 TIMES DAILY PRN
Qty: 30 TABLET | Refills: 0 | Status: SHIPPED | OUTPATIENT
Start: 2018-09-20 | End: 2019-01-18 | Stop reason: ALTCHOICE

## 2018-09-20 NOTE — PROGRESS NOTES
Assessment/Plan:    1  Strain of thoracic paraspinal muscles initial encounter    - reassurance most likely due to lifting her grandson, heat to area, stretching, consider PT once shingles resolves, can try Flexeril 10 mg at night allow for 8 hours of sleep  - cyclobenzaprine (FLEXERIL) 10 mg tablet; Take 1 tablet (10 mg total) by mouth 3 (three) times a day as needed for muscle spasms  Dispense: 30 tablet; Refill: 0    2  Herpes zoster without complication    - finish Valtrex, advised no longer contagious once lesions all crusted over, patient still refusing both prednisone and pain medication    F/u for repeat BP/physical  F/u as needed      Subjective:   Chief Complaint   Patient presents with    Herpes Zoster    Back Pain     upper       Patient ID: Ashli Huizar is a 51 y o  female  Started with an ache in left upper back 2 weeks ago after lifting and playing with her grandson who is 30 lbs all day  Tried heat, massage but nothing seemed to help  Then started to feel a different pain Last Thursday/Friday of a burning sensation this pain was more under her armpit and radiating around to her left breast   Saturday, 9/15/2018 started with rash in the area of the new pain she was feeling  Saw two red patches  Went to [place] [id number] and was dx with Shingles, put on Valtrex 1 gr 3 x a day for 7 days  Refusing prednisone and pain medication  Following up because she still has the very sensitive pain on her left armpit and breast region but the deep muscle ache that has been there for two weeks is also getting worse  Improved when her daughter gave her a massage but returns  Using a heating pad at work with some relief also  Hurts more with bending, stretching          The following portions of the patient's history were reviewed and updated as appropriate: allergies, current medications, past family history, past medical history, past social history, past surgical history and problem list     Past Medical History:   Diagnosis Date    Abnormal weight gain     Arthralgia     Stomatitis     last assessed 7/22/13; resolved 5/4/16     Past Surgical History:   Procedure Laterality Date    CHOLECYSTECTOMY LAPAROSCOPIC       Family History   Problem Relation Age of Onset    Diabetes Mother     Heart failure Father     Coronary artery disease Father     Diabetes Father     Other Father         pure hypercholesterolemia    Pancreatic cancer Paternal Grandmother     Cancer Family     Mental illness Neg Hx     Substance Abuse Neg Hx      Social History     Social History    Marital status: /Civil Union     Spouse name: N/A    Number of children: N/A    Years of education: N/A     Occupational History    Not on file  Social History Main Topics    Smoking status: Never Smoker    Smokeless tobacco: Never Used    Alcohol use 0 6 oz/week     1 Glasses of wine per week      Comment: Social    Drug use: No    Sexual activity: Not on file     Other Topics Concern    Not on file     Social History Narrative    Caffeine use - Nominal     Uses safety equipment - smoke detectors     Uses seat belts        Current Outpatient Prescriptions:     cholecalciferol (VITAMIN D3) 1,000 units tablet, Take by mouth every 8 (eight) hours, Disp: , Rfl:     esomeprazole (NEXIUM) 20 mg capsule, Take 1 capsule by mouth daily, Disp: , Rfl:     sertraline (ZOLOFT) 50 mg tablet, TAKE ONE TABLET BY MOUTH EVERY DAY, Disp: 30 tablet, Rfl: 0    valACYclovir (VALTREX) 1,000 mg tablet, Take 1,000 mg by mouth, Disp: , Rfl:     Review of Systems          Objective:    Vitals:    09/20/18 0952   BP: 130/88   BP Location: Right arm   Patient Position: Sitting   Cuff Size: Standard   Pulse: 80   Resp: 16   Temp: 98 °F (36 7 °C)   TempSrc: Oral   Weight: 96 2 kg (212 lb)   Height: 5' 4" (1 626 m)        Physical Exam   Constitutional: She is oriented to person, place, and time  She appears well-developed and well-nourished  Cardiovascular: Normal rate, regular rhythm and normal heart sounds  Pulmonary/Chest: Effort normal and breath sounds normal    Musculoskeletal: Normal range of motion  Left thoracic spinal muscles tight with spasm T2-6   Neurological: She is alert and oriented to person, place, and time  Skin:   Left dermatone T2 with two patches  One 3 cm erythema, 2 scabbed vesicular lesions, other area a cluster of erythematous papular lesions, no vesicles   Psychiatric: She has a normal mood and affect   Judgment normal

## 2018-10-18 DIAGNOSIS — Z12.39 SCREENING FOR MALIGNANT NEOPLASM OF BREAST: Primary | ICD-10-CM

## 2018-10-19 ENCOUNTER — TRANSCRIBE ORDERS (OUTPATIENT)
Dept: MAMMOGRAPHY | Facility: CLINIC | Age: 48
End: 2018-10-19

## 2018-10-19 ENCOUNTER — HOSPITAL ENCOUNTER (OUTPATIENT)
Dept: MAMMOGRAPHY | Facility: CLINIC | Age: 48
Discharge: HOME | End: 2018-10-19
Payer: COMMERCIAL

## 2018-10-19 DIAGNOSIS — Z12.39 SCREENING FOR MALIGNANT NEOPLASM OF BREAST: ICD-10-CM

## 2018-10-19 PROCEDURE — 77067 SCR MAMMO BI INCL CAD: CPT

## 2018-10-19 PROCEDURE — 77063 BREAST TOMOSYNTHESIS BI: CPT

## 2018-12-09 ENCOUNTER — OFFICE VISIT (OUTPATIENT)
Dept: URGENT CARE | Facility: CLINIC | Age: 48
End: 2018-12-09
Payer: COMMERCIAL

## 2018-12-09 VITALS
TEMPERATURE: 99.3 F | HEART RATE: 88 BPM | DIASTOLIC BLOOD PRESSURE: 86 MMHG | BODY MASS INDEX: 36.39 KG/M2 | OXYGEN SATURATION: 98 % | SYSTOLIC BLOOD PRESSURE: 154 MMHG | RESPIRATION RATE: 16 BRPM | HEIGHT: 64 IN

## 2018-12-09 DIAGNOSIS — K12.1 STOMATITIS, VIRAL: Primary | ICD-10-CM

## 2018-12-09 DIAGNOSIS — B97.89 STOMATITIS, VIRAL: Primary | ICD-10-CM

## 2018-12-09 DIAGNOSIS — J02.9 SORE THROAT: ICD-10-CM

## 2018-12-09 LAB — S PYO AG THROAT QL: NEGATIVE

## 2018-12-09 PROCEDURE — G0382 LEV 3 HOSP TYPE B ED VISIT: HCPCS | Performed by: FAMILY MEDICINE

## 2018-12-09 PROCEDURE — S9083 URGENT CARE CENTER GLOBAL: HCPCS | Performed by: FAMILY MEDICINE

## 2018-12-09 PROCEDURE — 87070 CULTURE OTHR SPECIMN AEROBIC: CPT | Performed by: FAMILY MEDICINE

## 2018-12-09 NOTE — PATIENT INSTRUCTIONS
Patient declines cautery of lesions  Declines viscus lidocaine  Salt water gargles  Ibuprofen as needed  Sx should resolve in the next 5-7 days

## 2018-12-09 NOTE — PROGRESS NOTES
[facility]        NAME: Karl Osborne is a 51 y o  female  : 1970    MRN: [id number]  DATE: 2018  TIME: 9:33 AM    Assessment and Plan   Stomatitis, viral [K12 1, B97 89]  1  Stomatitis, viral     2  Sore throat  POCT rapid strepA    Throat culture         Patient Instructions   Patient Instructions   Patient declines cautery of lesions  Declines viscus lidocaine  Salt water gargles  Ibuprofen as needed  Sx should resolve in the next 5-7 days        Proceed to  ER if symptoms worsen  Chief Complaint   No chief complaint on file  History of Present Illness       Patient presents with sore throat that began yesterday  She noted white spots on the left side of her soft palate  No sinus congestion or pressure different from baseline, no cough, fever, chills, headache, chest tightness, sob, or wheezing  Review of Systems   Review of Systems   Constitutional: Negative  HENT: Positive for mouth sores and sore throat  Eyes: Negative  Cardiovascular: Negative            Current Medications       Current Outpatient Prescriptions:     cholecalciferol (VITAMIN D3) 1,000 units tablet, Take by mouth every 8 (eight) hours, Disp: , Rfl:     cyclobenzaprine (FLEXERIL) 10 mg tablet, Take 1 tablet (10 mg total) by mouth 3 (three) times a day as needed for muscle spasms, Disp: 30 tablet, Rfl: 0    esomeprazole (NEXIUM) 20 mg capsule, Take 1 capsule by mouth daily, Disp: , Rfl:     sertraline (ZOLOFT) 50 mg tablet, TAKE ONE TABLET BY MOUTH EVERY DAY, Disp: 30 tablet, Rfl: 0    valACYclovir (VALTREX) 1,000 mg tablet, Take 1,000 mg by mouth, Disp: , Rfl:     Current Allergies     Allergies as of 2018 - Reviewed 2018   Allergen Reaction Noted    Codeine Tachycardia     Penicillins Hives 2012    Prednisone Irritability and Hyperactivity 2016    Sulfa antibiotics Hives 2012    Tetracycline  2017            The following portions of the patient's history were reviewed and updated as appropriate: allergies, current medications, past family history, past medical history, past social history, past surgical history and problem list      Past Medical History:   Diagnosis Date    Abnormal weight gain     Arthralgia     Stomatitis     last assessed 7/22/13; resolved 5/4/16       Past Surgical History:   Procedure Laterality Date    CHOLECYSTECTOMY LAPAROSCOPIC         Family History   Problem Relation Age of Onset    Diabetes Mother     Heart failure Father     Coronary artery disease Father     Diabetes Father     Other Father         pure hypercholesterolemia    Pancreatic cancer Paternal Grandmother     Cancer Family     Mental illness Neg Hx     Substance Abuse Neg Hx          Medications have been verified  Objective   There were no vitals taken for this visit  Physical Exam     Physical Exam   Constitutional: She appears well-developed and well-nourished  No distress  HENT:   Right Ear: External ear normal    Left Ear: External ear normal    TM clear bilaterally, pharynx with 2 adjacent left side soft palate 3mm plaques  No oropharyngeal erythema or swelling   Eyes: Conjunctivae are normal    Neck: Neck supple  Cardiovascular: Normal rate, regular rhythm and normal heart sounds  Pulmonary/Chest: Effort normal and breath sounds normal    Lymphadenopathy:     She has no cervical adenopathy

## 2018-12-11 LAB — BACTERIA THROAT CULT: NORMAL

## 2018-12-13 ENCOUNTER — TELEPHONE (OUTPATIENT)
Dept: URGENT CARE | Facility: CLINIC | Age: 48
End: 2018-12-13

## 2018-12-13 NOTE — TELEPHONE ENCOUNTER
Entered patient's chart because patient is in the office requesting that I look up throat culture results from when she was seen here a few days ago by a different provider  I advised her that the throat culture was negative  She is now having sinus congestion and pressure for 4-5 days  She is also having body aches  Sore throat is resolved  I advised sudafed, flonase, nasal rinses, and ibuprofen for symptomatic relief  Follow up for symptoms not improving in 10 days or development of fever  Patient in agreement with the plan

## 2019-01-12 RX ORDER — AZITHROMYCIN 250 MG/1
TABLET, FILM COATED ORAL
COMMUNITY
Start: 2018-10-16 | End: 2019-01-18 | Stop reason: ALTCHOICE

## 2019-01-17 ENCOUNTER — TELEPHONE (OUTPATIENT)
Dept: FAMILY MEDICINE CLINIC | Facility: CLINIC | Age: 49
End: 2019-01-17

## 2019-01-17 NOTE — TELEPHONE ENCOUNTER
If there are any cancellations in the morning for Allayne Purpura, this patient would like to be called   She is willing to come in earlier than her scheduled appointment of 9:30 am

## 2019-01-18 ENCOUNTER — ANNUAL EXAM (OUTPATIENT)
Dept: FAMILY MEDICINE CLINIC | Facility: CLINIC | Age: 49
End: 2019-01-18
Payer: COMMERCIAL

## 2019-01-18 VITALS
BODY MASS INDEX: 35.83 KG/M2 | HEART RATE: 80 BPM | RESPIRATION RATE: 16 BRPM | DIASTOLIC BLOOD PRESSURE: 86 MMHG | SYSTOLIC BLOOD PRESSURE: 122 MMHG | HEIGHT: 64 IN | WEIGHT: 209.9 LBS

## 2019-01-18 DIAGNOSIS — Z23 NEED FOR TDAP VACCINATION: ICD-10-CM

## 2019-01-18 DIAGNOSIS — E78.5 HYPERLIPIDEMIA, UNSPECIFIED HYPERLIPIDEMIA TYPE: ICD-10-CM

## 2019-01-18 DIAGNOSIS — E55.9 VITAMIN D DEFICIENCY: ICD-10-CM

## 2019-01-18 DIAGNOSIS — R73.9 HYPERGLYCEMIA: ICD-10-CM

## 2019-01-18 DIAGNOSIS — R14.0 BLOATING: ICD-10-CM

## 2019-01-18 DIAGNOSIS — Z12.4 ENCOUNTER FOR PAPANICOLAOU SMEAR FOR CERVICAL CANCER SCREENING: ICD-10-CM

## 2019-01-18 DIAGNOSIS — Z01.419 GYNECOLOGIC EXAM NORMAL: Primary | ICD-10-CM

## 2019-01-18 PROCEDURE — 90471 IMMUNIZATION ADMIN: CPT

## 2019-01-18 PROCEDURE — 90715 TDAP VACCINE 7 YRS/> IM: CPT

## 2019-01-18 PROCEDURE — 99396 PREV VISIT EST AGE 40-64: CPT | Performed by: PHYSICIAN ASSISTANT

## 2019-01-18 RX ORDER — FLUTICASONE PROPIONATE 50 MCG
SPRAY, SUSPENSION (ML) NASAL
COMMUNITY
Start: 2018-12-18 | End: 2019-04-12 | Stop reason: ALTCHOICE

## 2019-01-18 NOTE — PROGRESS NOTES
ASSESSMENT & PLAN: Jerrilyn Schlatter is a 51 y o  No obstetric history on file  with normal gynecologic exam     1   Routine well woman exam done today  2    Pap and HPV:Pap with HPV was not done today  Current ASCCP Guidelines reviewed  3   The patient refused STD testing  none testing performed  Safe sex practices have been discussed  4  The patient is sexually active  She refused contraception and options have been discussed  5  The following were reviewed in today's visit: breast self exam, mammography screening ordered, menopause, adequate intake of calcium and vitamin D, exercise, healthy diet and colonoscopy discussed and ordered  6  Patient to return to office in 12 months for routine physical      All questions have been answered to her satisfaction  CC:  Annual Gynecologic Examination    HPI: Jerrilyn Schlatter is a 51 y o  No obstetric history on file  who presents for annual gynecologic examination  She has the following concerns:  Irregular periods, getting vaginal dryness off and on  12/3/2018  Has a lot of gas in stomach, feeling bloated, burping  Thinks it might be nerves  Eliminated soda, coffee drinking more water  Health Maintenance:    She exercises starting a exercise swim class this week  She wears her seatbelt routinely  She does perform regular monthly self breast exams  She feels safe at home  Patients does follow a balanced diet  Past Medical History:   Diagnosis Date    Abnormal weight gain     Arthralgia     Stomatitis     last assessed 7/22/13; resolved 5/4/16       Past Surgical History:   Procedure Laterality Date    CHOLECYSTECTOMY LAPAROSCOPIC         Past OB/Gyn History:  Irregular menstrual cycle, Dec 3,2018, perimenopause    History of sexually transmitted infection No  Patient is currently sexually active: heterosexual and  monogamous   Birth control:vasectomy,     Last Pap  5/2016 :  no abnormalities;  HPV negative    Family History  Family History   Problem Relation Age of Onset    Diabetes Mother     Heart failure Father     Coronary artery disease Father     Diabetes Father     Other Father         pure hypercholesterolemia    Pancreatic cancer Paternal Grandmother     Cancer Family     Mental illness Neg Hx     Substance Abuse Neg Hx        Social History:  Social History     Social History    Marital status: /Civil Union     Spouse name: N/A    Number of children: N/A    Years of education: N/A     Occupational History    Not on file  Social History Main Topics    Smoking status: Never Smoker    Smokeless tobacco: Never Used    Alcohol use 0 6 oz/week     1 Glasses of wine per week      Comment: Social    Drug use: No    Sexual activity: Not on file     Other Topics Concern    Not on file     Social History Narrative    Caffeine use - Nominal     Uses safety equipment - smoke detectors     Uses seat belts      Presently lives with   Patient is   Patient is currently employed  Allergies: Allergies   Allergen Reactions    Codeine Tachycardia    Penicillins Hives    Prednisone Irritability and Hyperactivity    Sulfa Antibiotics Hives    Tetracycline        Medications:    Current Outpatient Prescriptions:     esomeprazole (NEXIUM) 20 mg capsule, Take 1 capsule by mouth daily, Disp: , Rfl:     fluticasone (FLONASE) 50 mcg/act nasal spray, , Disp: , Rfl:     sertraline (ZOLOFT) 50 mg tablet, TAKE ONE TABLET BY MOUTH EVERY DAY, Disp: 30 tablet, Rfl: 0    valACYclovir (VALTREX) 1,000 mg tablet, Take 1,000 mg by mouth, Disp: , Rfl:     Review of Systems:  Review of Systems   Constitutional: Negative  HENT: Negative  Eyes: Negative  Respiratory: Negative  Cardiovascular: Negative  Gastrointestinal:        As per HPI   Endocrine: Negative  Genitourinary: Negative  Musculoskeletal: Negative  Skin: Negative  Allergic/Immunologic: Negative  Neurological: Negative      Hematological: Negative  Psychiatric/Behavioral: Negative  Physical Exam:  /90 (BP Location: Left arm, Patient Position: Sitting, Cuff Size: Standard)   Pulse 80   Resp 16   Ht 5' 4" (1 626 m)   Wt 95 2 kg (209 lb 14 4 oz)   BMI 36 03 kg/m²    Physical Exam   Constitutional: She is oriented to person, place, and time  She appears well-developed and well-nourished  Genitourinary: Vagina normal and uterus normal  There is no rash, tenderness or lesion on the right labia  There is no rash, tenderness or lesion on the left labia  Vagina exhibits no lesion  No erythema or bleeding in the vagina  No vaginal discharge found  Right adnexum does not display mass, does not display tenderness and does not display fullness  Left adnexum does not display mass, does not display tenderness and does not display fullness  Cervix does not exhibit motion tenderness, lesion, discharge or polyp  Uterus is not enlarged, tender or exhibiting a mass  HENT:   Head: Normocephalic and atraumatic  Eyes: Pupils are equal, round, and reactive to light  Conjunctivae and EOM are normal    Neck: Normal range of motion  Neck supple  No thyromegaly present  Cardiovascular: Normal rate, regular rhythm, normal heart sounds and intact distal pulses  No murmur heard  Pulmonary/Chest: Effort normal and breath sounds normal  No respiratory distress  She has no wheezes  She has no rales  Right breast exhibits no inverted nipple, no mass, no nipple discharge and no skin change  Left breast exhibits no inverted nipple, no mass, no nipple discharge and no skin change  Abdominal: Soft  Bowel sounds are normal  She exhibits no distension and no mass  No hernia  Hernia confirmed negative in the right inguinal area and confirmed negative in the left inguinal area  Musculoskeletal: Normal range of motion  Lymphadenopathy:     She has no cervical adenopathy  She has no axillary adenopathy          Right: No inguinal and no supraclavicular adenopathy present  Left: No inguinal and no supraclavicular adenopathy present  Neurological: She is alert and oriented to person, place, and time  She has normal reflexes  No cranial nerve deficit  Skin: Skin is warm and dry  Psychiatric: She has a normal mood and affect   Her behavior is normal  Judgment normal

## 2019-01-22 ENCOUNTER — APPOINTMENT (OUTPATIENT)
Dept: LAB | Facility: CLINIC | Age: 49
End: 2019-01-22
Payer: COMMERCIAL

## 2019-01-22 DIAGNOSIS — E78.5 HYPERLIPIDEMIA, UNSPECIFIED HYPERLIPIDEMIA TYPE: ICD-10-CM

## 2019-01-22 DIAGNOSIS — E55.9 VITAMIN D DEFICIENCY: ICD-10-CM

## 2019-01-22 LAB
ALBUMIN SERPL BCP-MCNC: 3.5 G/DL (ref 3.5–5)
ALP SERPL-CCNC: 72 U/L (ref 46–116)
ALT SERPL W P-5'-P-CCNC: 23 U/L (ref 12–78)
ANION GAP SERPL CALCULATED.3IONS-SCNC: 7 MMOL/L (ref 4–13)
AST SERPL W P-5'-P-CCNC: 14 U/L (ref 5–45)
BASOPHILS # BLD AUTO: 0.05 THOUSANDS/ΜL (ref 0–0.1)
BASOPHILS NFR BLD AUTO: 1 % (ref 0–1)
BILIRUB SERPL-MCNC: 0.29 MG/DL (ref 0.2–1)
BUN SERPL-MCNC: 13 MG/DL (ref 5–25)
CALCIUM SERPL-MCNC: 8.3 MG/DL (ref 8.3–10.1)
CHLORIDE SERPL-SCNC: 103 MMOL/L (ref 100–108)
CHOLEST SERPL-MCNC: 174 MG/DL (ref 50–200)
CO2 SERPL-SCNC: 26 MMOL/L (ref 21–32)
CREAT SERPL-MCNC: 0.73 MG/DL (ref 0.6–1.3)
EOSINOPHIL # BLD AUTO: 0.2 THOUSAND/ΜL (ref 0–0.61)
EOSINOPHIL NFR BLD AUTO: 2 % (ref 0–6)
ERYTHROCYTE [DISTWIDTH] IN BLOOD BY AUTOMATED COUNT: 16.5 % (ref 11.6–15.1)
EST. AVERAGE GLUCOSE BLD GHB EST-MCNC: 137 MG/DL
GFR SERPL CREATININE-BSD FRML MDRD: 97 ML/MIN/1.73SQ M
GLUCOSE P FAST SERPL-MCNC: 105 MG/DL (ref 65–99)
HBA1C MFR BLD: 6.4 % (ref 4.2–6.3)
HCT VFR BLD AUTO: 36.4 % (ref 34.8–46.1)
HDLC SERPL-MCNC: 41 MG/DL (ref 40–60)
HGB BLD-MCNC: 10.5 G/DL (ref 11.5–15.4)
IMM GRANULOCYTES # BLD AUTO: 0.02 THOUSAND/UL (ref 0–0.2)
IMM GRANULOCYTES NFR BLD AUTO: 0 % (ref 0–2)
LDLC SERPL CALC-MCNC: 95 MG/DL (ref 0–100)
LYMPHOCYTES # BLD AUTO: 3.83 THOUSANDS/ΜL (ref 0.6–4.47)
LYMPHOCYTES NFR BLD AUTO: 39 % (ref 14–44)
MCH RBC QN AUTO: 21.9 PG (ref 26.8–34.3)
MCHC RBC AUTO-ENTMCNC: 28.8 G/DL (ref 31.4–37.4)
MCV RBC AUTO: 76 FL (ref 82–98)
MONOCYTES # BLD AUTO: 1.16 THOUSAND/ΜL (ref 0.17–1.22)
MONOCYTES NFR BLD AUTO: 12 % (ref 4–12)
NEUTROPHILS # BLD AUTO: 4.68 THOUSANDS/ΜL (ref 1.85–7.62)
NEUTS SEG NFR BLD AUTO: 46 % (ref 43–75)
NRBC BLD AUTO-RTO: 0 /100 WBCS
PLATELET # BLD AUTO: 227 THOUSANDS/UL (ref 149–390)
PMV BLD AUTO: 10.1 FL (ref 8.9–12.7)
POTASSIUM SERPL-SCNC: 3.7 MMOL/L (ref 3.5–5.3)
PROT SERPL-MCNC: 7.7 G/DL (ref 6.4–8.2)
RBC # BLD AUTO: 4.8 MILLION/UL (ref 3.81–5.12)
SODIUM SERPL-SCNC: 136 MMOL/L (ref 136–145)
TRIGL SERPL-MCNC: 188 MG/DL
TSH SERPL DL<=0.05 MIU/L-ACNC: 2.4 UIU/ML (ref 0.36–3.74)
WBC # BLD AUTO: 9.94 THOUSAND/UL (ref 4.31–10.16)

## 2019-01-22 PROCEDURE — 84443 ASSAY THYROID STIM HORMONE: CPT

## 2019-01-22 PROCEDURE — 80053 COMPREHEN METABOLIC PANEL: CPT

## 2019-01-22 PROCEDURE — 83036 HEMOGLOBIN GLYCOSYLATED A1C: CPT | Performed by: PHYSICIAN ASSISTANT

## 2019-01-22 PROCEDURE — 82652 VIT D 1 25-DIHYDROXY: CPT

## 2019-01-22 PROCEDURE — 85025 COMPLETE CBC W/AUTO DIFF WBC: CPT

## 2019-01-22 PROCEDURE — 80061 LIPID PANEL: CPT

## 2019-01-22 PROCEDURE — 36415 COLL VENOUS BLD VENIPUNCTURE: CPT | Performed by: PHYSICIAN ASSISTANT

## 2019-01-23 LAB — 1,25(OH)2D3 SERPL-MCNC: 62.1 PG/ML (ref 19.9–79.3)

## 2019-01-25 DIAGNOSIS — E78.1 HYPERTRIGLYCERIDEMIA: ICD-10-CM

## 2019-01-25 DIAGNOSIS — D50.0 IRON DEFICIENCY ANEMIA DUE TO CHRONIC BLOOD LOSS: ICD-10-CM

## 2019-01-25 DIAGNOSIS — R73.9 HYPERGLYCEMIA: Primary | ICD-10-CM

## 2019-01-27 LAB
CLINICAL INFO: ABNORMAL
DATE PREVIOUS BX: ABNORMAL
HPV I/H RISK 1 DNA CVX QL PROBE+SIG AMP: NOT DETECTED
LMP START DATE: ABNORMAL
SL AMB PREV. PAP:: ABNORMAL
SPECIMEN SOURCE CVX/VAG CYTO: ABNORMAL

## 2019-01-28 ENCOUNTER — TRANSCRIBE ORDERS (OUTPATIENT)
Dept: ADMINISTRATIVE | Facility: HOSPITAL | Age: 49
End: 2019-01-28

## 2019-01-31 ENCOUNTER — TELEPHONE (OUTPATIENT)
Dept: FAMILY MEDICINE CLINIC | Facility: CLINIC | Age: 49
End: 2019-01-31

## 2019-01-31 NOTE — TELEPHONE ENCOUNTER
Patient is calling for her pap results from January 18, 2019  She said she has looked in MyChart and cannot see any results

## 2019-02-01 NOTE — TELEPHONE ENCOUNTER
Informed patient and she confirmed that she got your message and responded back to you  Will do repeat pap in 6 months

## 2019-03-18 ENCOUNTER — TRANSCRIBE ORDERS (OUTPATIENT)
Dept: ADMINISTRATIVE | Facility: HOSPITAL | Age: 49
End: 2019-03-18

## 2019-03-26 ENCOUNTER — APPOINTMENT (OUTPATIENT)
Dept: LAB | Facility: CLINIC | Age: 49
End: 2019-03-26
Payer: COMMERCIAL

## 2019-03-26 DIAGNOSIS — D50.0 IRON DEFICIENCY ANEMIA DUE TO CHRONIC BLOOD LOSS: ICD-10-CM

## 2019-03-26 LAB
BASOPHILS # BLD AUTO: 0.06 THOUSANDS/ΜL (ref 0–0.1)
BASOPHILS NFR BLD AUTO: 1 % (ref 0–1)
EOSINOPHIL # BLD AUTO: 0.19 THOUSAND/ΜL (ref 0–0.61)
EOSINOPHIL NFR BLD AUTO: 2 % (ref 0–6)
ERYTHROCYTE [DISTWIDTH] IN BLOOD BY AUTOMATED COUNT: 21.6 % (ref 11.6–15.1)
HCT VFR BLD AUTO: 41 % (ref 34.8–46.1)
HGB BLD-MCNC: 12.9 G/DL (ref 11.5–15.4)
IMM GRANULOCYTES # BLD AUTO: 0.02 THOUSAND/UL (ref 0–0.2)
IMM GRANULOCYTES NFR BLD AUTO: 0 % (ref 0–2)
IRON SERPL-MCNC: 56 UG/DL (ref 50–170)
LYMPHOCYTES # BLD AUTO: 4.28 THOUSANDS/ΜL (ref 0.6–4.47)
LYMPHOCYTES NFR BLD AUTO: 42 % (ref 14–44)
MCH RBC QN AUTO: 26.4 PG (ref 26.8–34.3)
MCHC RBC AUTO-ENTMCNC: 31.5 G/DL (ref 31.4–37.4)
MCV RBC AUTO: 84 FL (ref 82–98)
MONOCYTES # BLD AUTO: 0.85 THOUSAND/ΜL (ref 0.17–1.22)
MONOCYTES NFR BLD AUTO: 8 % (ref 4–12)
NEUTROPHILS # BLD AUTO: 4.87 THOUSANDS/ΜL (ref 1.85–7.62)
NEUTS SEG NFR BLD AUTO: 47 % (ref 43–75)
NRBC BLD AUTO-RTO: 0 /100 WBCS
PLATELET # BLD AUTO: 182 THOUSANDS/UL (ref 149–390)
PMV BLD AUTO: 10.1 FL (ref 8.9–12.7)
RBC # BLD AUTO: 4.89 MILLION/UL (ref 3.81–5.12)
WBC # BLD AUTO: 10.27 THOUSAND/UL (ref 4.31–10.16)

## 2019-03-26 PROCEDURE — 85025 COMPLETE CBC W/AUTO DIFF WBC: CPT

## 2019-03-26 PROCEDURE — 83540 ASSAY OF IRON: CPT

## 2019-03-26 PROCEDURE — 36415 COLL VENOUS BLD VENIPUNCTURE: CPT

## 2019-03-29 ENCOUNTER — OFFICE VISIT (OUTPATIENT)
Dept: URGENT CARE | Facility: CLINIC | Age: 49
End: 2019-03-29
Payer: COMMERCIAL

## 2019-03-29 VITALS
DIASTOLIC BLOOD PRESSURE: 82 MMHG | HEART RATE: 83 BPM | WEIGHT: 200 LBS | OXYGEN SATURATION: 98 % | BODY MASS INDEX: 34.15 KG/M2 | TEMPERATURE: 100.7 F | HEIGHT: 64 IN | SYSTOLIC BLOOD PRESSURE: 126 MMHG | RESPIRATION RATE: 20 BRPM

## 2019-03-29 DIAGNOSIS — R06.2 WHEEZING: Primary | ICD-10-CM

## 2019-03-29 DIAGNOSIS — B96.89 BACTERIAL UPPER RESPIRATORY INFECTION: ICD-10-CM

## 2019-03-29 DIAGNOSIS — J06.9 BACTERIAL UPPER RESPIRATORY INFECTION: ICD-10-CM

## 2019-03-29 DIAGNOSIS — R68.89 FLU-LIKE SYMPTOMS: ICD-10-CM

## 2019-03-29 PROCEDURE — 87631 RESP VIRUS 3-5 TARGETS: CPT | Performed by: NURSE PRACTITIONER

## 2019-03-29 PROCEDURE — 99213 OFFICE O/P EST LOW 20 MIN: CPT | Performed by: NURSE PRACTITIONER

## 2019-03-29 PROCEDURE — 94640 AIRWAY INHALATION TREATMENT: CPT | Performed by: NURSE PRACTITIONER

## 2019-03-29 RX ORDER — ESOMEPRAZOLE MAGNESIUM 20 MG/1
20 FOR SUSPENSION ORAL
COMMUNITY
End: 2019-03-29

## 2019-03-29 RX ORDER — AZITHROMYCIN 250 MG/1
TABLET, FILM COATED ORAL
Qty: 6 TABLET | Refills: 0 | Status: SHIPPED | OUTPATIENT
Start: 2019-03-29 | End: 2019-04-02

## 2019-03-29 RX ADMIN — Medication 0.5 MG: at 14:17

## 2019-03-29 NOTE — PATIENT INSTRUCTIONS
Wheezing   WHAT YOU NEED TO KNOW:   Wheezing happens when air flows through a narrowed airway  Wheezing can happen when you breathe in, breathe out, or both  Wheezes may sound like a whistle, squeal, groan, or creak  Wheezes may also sound musical or high-pitched  Wheezing cannot be stopped by coughing  Asthma, allergies, or infection are the most common causes of wheezing  A foreign body, asthma, extra mucus, or smoking can also cause wheezing  DISCHARGE INSTRUCTIONS:   Call 911 if:   · You have sudden trouble breathing  · Your throat feels like it is swelling or feels tight  · You are dizzy, lightheaded, confused, or feel faint  · You have chest pain or tightness  Return to the emergency department if:   · You have shortness of breath  · You are coughing up blood  · You have chest pain  Contact your healthcare provider if:   · You have a fever  · Your wheezing does not get better or it gets worse  · You have questions or concerns about your condition or care  Medicines:   · Medicines  decrease inflammation, open airways, and make it easier to breathe  · Take your medicine as directed  Contact your healthcare provider if you think your medicine is not helping or if you have side effects  Tell him of her if you are allergic to any medicine  Keep a list of the medicines, vitamins, and herbs you take  Include the amounts, and when and why you take them  Bring the list or the pill bottles to follow-up visits  Carry your medicine list with you in case of an emergency  Follow up with your healthcare provider as directed: You may be referred to a specialist  Write down your questions so you remember to ask them during your visits  Manage your symptoms:   · Avoid allergy triggers , such as animals, grass, pollen, or dust     · Return to your usual activity as directed  You may need to limit certain activities until you follow up with your healthcare provider or your symptoms improve  Your child may need to avoid sports until his symptoms improve  · Take deep breaths and cough several times a day  This will decrease your risk for a lung infection and help decrease wheezing  Take a deep breath and hold it for as long as you can  Let the air out and then cough strongly  Deep breaths help open your airway  You may be given an incentive spirometer to help you take deep breaths  Put the plastic piece in your mouth and take a slow, deep breath, then let the air out and cough  Repeat these steps 10 times every hour  · Drink liquids as directed  You may need to drink more liquids than usual to thin your mucus and prevent dehydration  Ask how much liquid to drink each day and which liquids are best for you  © 2017 [facility] Information is for End User's use only and may not be sold, redistributed or otherwise used for commercial purposes  All illustrations and images included in CareNotes® are the copyrighted property of A D A M [employer] Inc  or Juvenal Gomez  The above information is an  only  It is not intended as medical advice for individual conditions or treatments  Talk to your doctor, nurse or pharmacist before following any medical regimen to see if it is safe and effective for you

## 2019-03-29 NOTE — PROGRESS NOTES
[id number]        NAME: Irasema Eisenberg is a 51 y o  female  : 1970    MRN: [id number]  DATE: 2019  TIME: 2:40 PM    Assessment and Plan   Wheezing [R06 2]  1  Wheezing  ipratropium (ATROVENT) 0 02 % inhalation solution 0 5 mg   2  Bacterial upper respiratory infection  azithromycin (ZITHROMAX) 250 mg tablet   3  Flu-like symptoms  Influenza A/B and RSV by PCR         Patient Instructions     coricidin for cough and congestion  Nebulizer treatment for wheezing  She cannot use steroids; albuterol causes tachycardia; hence we gave ipratropium  Follow up with PCP in 3-5 days  Proceed to  ER if symptoms worsen  Chief Complaint     Chief Complaint   Patient presents with    Cough     patient reports she has had a productive cough since Tuesday along with a fever, body aches  Taking  Ibuprofen every 8 hours with some relief  History of Present Illness       HPI   Symptoms started 3 days ago  Fever started suddenly, yesterday  Other s/s include cough, rattling on her throat, and feverish feel    Review of Systems   Review of Systems   Constitutional: Positive for chills, fatigue and fever  HENT: Positive for congestion and rhinorrhea  Negative for sore throat  Respiratory: Positive for cough and wheezing  Negative for chest tightness  Cardiovascular: Negative for chest pain  Gastrointestinal: Positive for nausea (a little bit yesterday, not so much today)  Negative for diarrhea           Current Medications       Current Outpatient Medications:     sertraline (ZOLOFT) 50 mg tablet, TAKE ONE TABLET BY MOUTH EVERY DAY, Disp: 30 tablet, Rfl: 0    azithromycin (ZITHROMAX) 250 mg tablet, Take 2 tablets today then 1 tablet daily x 4 days, Disp: 6 tablet, Rfl: 0    esomeprazole (NEXIUM) 20 mg capsule, Take 1 capsule by mouth daily, Disp: , Rfl:     fluticasone (FLONASE) 50 mcg/act nasal spray, , Disp: , Rfl:     valACYclovir (VALTREX) 1,000 mg tablet, Take 1,000 mg by mouth, Disp: , Rfl:   No current facility-administered medications for this visit  Current Allergies     Allergies as of 03/29/2019 - Reviewed 03/29/2019   Allergen Reaction Noted    Codeine Tachycardia     Penicillins Hives 04/19/2012    Prednisone Irritability and Hyperactivity 08/31/2016    Sulfa antibiotics Hives 04/19/2012    Tetracycline  01/21/2017            The following portions of the patient's history were reviewed and updated as appropriate: allergies, current medications, past family history, past medical history, past social history, past surgical history and problem list      Past Medical History:   Diagnosis Date    Abnormal weight gain     Anxiety     Arthralgia     Stomatitis     last assessed 7/22/13; resolved 5/4/16       Past Surgical History:   Procedure Laterality Date    CHOLECYSTECTOMY LAPAROSCOPIC         Family History   Problem Relation Age of Onset    Diabetes Mother     Heart failure Father     Coronary artery disease Father     Diabetes Father     Other Father         pure hypercholesterolemia    Pancreatic cancer Paternal Grandmother     Cancer Family     Mental illness Neg Hx     Substance Abuse Neg Hx          Medications have been verified  Objective   /82   Pulse 83   Temp (!) 100 7 °F (38 2 °C)   Resp 20   Ht 5' 4" (1 626 m)   Wt 90 7 kg (200 lb)   SpO2 98%   BMI 34 33 kg/m²        Physical Exam     Physical Exam   HENT:   B/L ear canals with moderate erythema, mild erythema of the TMs  No swelling of the canals and no bulging or serous fluid of the TMs  Cardiovascular: Normal rate and regular rhythm  Pulmonary/Chest: Effort normal  No respiratory distress  She has wheezes  Some wheezing in the R lower lobe which cleared with coughing   Abdominal: Soft  Bowel sounds are normal    Lymphadenopathy:     She has no cervical adenopathy

## 2019-03-30 LAB
FLUAV AG SPEC QL: NOT DETECTED
FLUBV AG SPEC QL: NOT DETECTED
RSV B RNA SPEC QL NAA+PROBE: NOT DETECTED

## 2019-04-04 ENCOUNTER — APPOINTMENT (OUTPATIENT)
Dept: RADIOLOGY | Facility: CLINIC | Age: 49
End: 2019-04-04
Payer: COMMERCIAL

## 2019-04-04 ENCOUNTER — OFFICE VISIT (OUTPATIENT)
Dept: URGENT CARE | Facility: CLINIC | Age: 49
End: 2019-04-04
Payer: COMMERCIAL

## 2019-04-04 VITALS
HEART RATE: 76 BPM | BODY MASS INDEX: 34.15 KG/M2 | WEIGHT: 200 LBS | OXYGEN SATURATION: 97 % | DIASTOLIC BLOOD PRESSURE: 80 MMHG | TEMPERATURE: 99.1 F | RESPIRATION RATE: 20 BRPM | HEIGHT: 64 IN | SYSTOLIC BLOOD PRESSURE: 132 MMHG

## 2019-04-04 DIAGNOSIS — R06.2 WHEEZING: Primary | ICD-10-CM

## 2019-04-04 DIAGNOSIS — R06.2 WHEEZING: ICD-10-CM

## 2019-04-04 DIAGNOSIS — J18.9 PNEUMONIA OF RIGHT MIDDLE LOBE DUE TO INFECTIOUS ORGANISM: ICD-10-CM

## 2019-04-04 DIAGNOSIS — R05.9 COUGH: ICD-10-CM

## 2019-04-04 PROCEDURE — G0382 LEV 3 HOSP TYPE B ED VISIT: HCPCS | Performed by: NURSE PRACTITIONER

## 2019-04-04 PROCEDURE — 94640 AIRWAY INHALATION TREATMENT: CPT | Performed by: NURSE PRACTITIONER

## 2019-04-04 PROCEDURE — 71046 X-RAY EXAM CHEST 2 VIEWS: CPT

## 2019-04-04 PROCEDURE — S9083 URGENT CARE CENTER GLOBAL: HCPCS | Performed by: NURSE PRACTITIONER

## 2019-04-04 RX ORDER — ALBUTEROL SULFATE 90 UG/1
2 AEROSOL, METERED RESPIRATORY (INHALATION) EVERY 6 HOURS PRN
Qty: 1 INHALER | Refills: 0 | Status: SHIPPED | OUTPATIENT
Start: 2019-04-04 | End: 2019-08-27 | Stop reason: ALTCHOICE

## 2019-04-04 RX ORDER — ALBUTEROL SULFATE 2.5 MG/3ML
2.5 SOLUTION RESPIRATORY (INHALATION) ONCE
Status: COMPLETED | OUTPATIENT
Start: 2019-04-04 | End: 2019-04-04

## 2019-04-04 RX ORDER — BENZONATATE 100 MG/1
100 CAPSULE ORAL 3 TIMES DAILY PRN
Qty: 20 CAPSULE | Refills: 0 | Status: SHIPPED | OUTPATIENT
Start: 2019-04-04 | End: 2019-04-12 | Stop reason: ALTCHOICE

## 2019-04-04 RX ORDER — LEVOFLOXACIN 500 MG/1
500 TABLET, FILM COATED ORAL EVERY 24 HOURS
Qty: 5 TABLET | Refills: 0 | Status: SHIPPED | OUTPATIENT
Start: 2019-04-04 | End: 2019-04-09

## 2019-04-04 RX ADMIN — ALBUTEROL SULFATE 2.5 MG: 2.5 SOLUTION RESPIRATORY (INHALATION) at 10:03

## 2019-04-04 RX ADMIN — Medication 0.5 MG: at 10:04

## 2019-04-12 ENCOUNTER — OFFICE VISIT (OUTPATIENT)
Dept: FAMILY MEDICINE CLINIC | Facility: CLINIC | Age: 49
End: 2019-04-12
Payer: COMMERCIAL

## 2019-04-12 VITALS
HEART RATE: 78 BPM | TEMPERATURE: 98.1 F | HEIGHT: 64 IN | BODY MASS INDEX: 36.04 KG/M2 | DIASTOLIC BLOOD PRESSURE: 88 MMHG | SYSTOLIC BLOOD PRESSURE: 130 MMHG | WEIGHT: 211.1 LBS | RESPIRATION RATE: 18 BRPM

## 2019-04-12 DIAGNOSIS — S46.012A ROTATOR CUFF STRAIN, LEFT, INITIAL ENCOUNTER: ICD-10-CM

## 2019-04-12 DIAGNOSIS — J18.9 PNEUMONIA OF RIGHT MIDDLE LOBE DUE TO INFECTIOUS ORGANISM: Primary | ICD-10-CM

## 2019-04-12 PROCEDURE — 3008F BODY MASS INDEX DOCD: CPT | Performed by: PHYSICIAN ASSISTANT

## 2019-04-12 PROCEDURE — 99214 OFFICE O/P EST MOD 30 MIN: CPT | Performed by: PHYSICIAN ASSISTANT

## 2019-04-12 PROCEDURE — 1036F TOBACCO NON-USER: CPT | Performed by: PHYSICIAN ASSISTANT

## 2019-05-20 ENCOUNTER — TRANSCRIBE ORDERS (OUTPATIENT)
Dept: ADMINISTRATIVE | Facility: HOSPITAL | Age: 49
End: 2019-05-20

## 2019-05-20 ENCOUNTER — APPOINTMENT (OUTPATIENT)
Dept: LAB | Facility: CLINIC | Age: 49
End: 2019-05-20

## 2019-05-20 DIAGNOSIS — Z01.84 IMMUNITY STATUS TESTING: Primary | ICD-10-CM

## 2019-05-20 DIAGNOSIS — Z01.84 IMMUNITY STATUS TESTING: ICD-10-CM

## 2019-05-20 PROCEDURE — 86706 HEP B SURFACE ANTIBODY: CPT

## 2019-05-20 PROCEDURE — 86765 RUBEOLA ANTIBODY: CPT

## 2019-05-20 PROCEDURE — 36415 COLL VENOUS BLD VENIPUNCTURE: CPT

## 2019-05-21 LAB
HBV SURFACE AB SER-ACNC: <3.1 MIU/ML
MEV IGG SER QL: ABNORMAL

## 2019-06-02 DIAGNOSIS — F41.8 DEPRESSION WITH ANXIETY: ICD-10-CM

## 2019-08-01 ENCOUNTER — APPOINTMENT (OUTPATIENT)
Dept: RADIOLOGY | Facility: CLINIC | Age: 49
End: 2019-08-01
Payer: COMMERCIAL

## 2019-08-01 DIAGNOSIS — J18.9 PNEUMONIA OF RIGHT MIDDLE LOBE DUE TO INFECTIOUS ORGANISM: ICD-10-CM

## 2019-08-01 PROCEDURE — 71046 X-RAY EXAM CHEST 2 VIEWS: CPT

## 2019-08-27 ENCOUNTER — OFFICE VISIT (OUTPATIENT)
Dept: FAMILY MEDICINE CLINIC | Facility: CLINIC | Age: 49
End: 2019-08-27
Payer: COMMERCIAL

## 2019-08-27 VITALS
RESPIRATION RATE: 16 BRPM | HEART RATE: 72 BPM | BODY MASS INDEX: 37.1 KG/M2 | SYSTOLIC BLOOD PRESSURE: 124 MMHG | DIASTOLIC BLOOD PRESSURE: 82 MMHG | WEIGHT: 217.3 LBS | HEIGHT: 64 IN

## 2019-08-27 DIAGNOSIS — Z12.4 ENCOUNTER FOR REPEAT PAPANICOLAOU SMEAR OF CERVIX: ICD-10-CM

## 2019-08-27 DIAGNOSIS — Z12.9 SCREENING FOR MALIGNANT NEOPLASM: ICD-10-CM

## 2019-08-27 DIAGNOSIS — Z12.11 SCREENING FOR MALIGNANT NEOPLASM OF COLON: Primary | ICD-10-CM

## 2019-08-27 DIAGNOSIS — E66.9 OBESITY (BMI 35.0-39.9 WITHOUT COMORBIDITY): ICD-10-CM

## 2019-08-27 DIAGNOSIS — R87.610 ASCUS OF CERVIX WITH NEGATIVE HIGH RISK HPV: ICD-10-CM

## 2019-08-27 DIAGNOSIS — Z12.39 SCREENING FOR MALIGNANT NEOPLASM OF BREAST: ICD-10-CM

## 2019-08-27 PROCEDURE — 1036F TOBACCO NON-USER: CPT | Performed by: PHYSICIAN ASSISTANT

## 2019-08-27 PROCEDURE — G0145 SCR C/V CYTO,THINLAYER,RESCR: HCPCS | Performed by: PHYSICIAN ASSISTANT

## 2019-08-27 PROCEDURE — 3008F BODY MASS INDEX DOCD: CPT | Performed by: PHYSICIAN ASSISTANT

## 2019-08-27 PROCEDURE — 87624 HPV HI-RISK TYP POOLED RSLT: CPT | Performed by: PHYSICIAN ASSISTANT

## 2019-08-27 PROCEDURE — 99213 OFFICE O/P EST LOW 20 MIN: CPT | Performed by: PHYSICIAN ASSISTANT

## 2019-08-28 LAB
HPV HR 12 DNA CVX QL NAA+PROBE: NEGATIVE
HPV16 DNA CVX QL NAA+PROBE: NEGATIVE
HPV18 DNA CVX QL NAA+PROBE: NEGATIVE

## 2019-08-28 NOTE — PROGRESS NOTES
Assessment/Plan:    1  ASCUS of cervix with negative high risk HPV    - PAP 1/2019 ASCUS HPV negative, patient requesting rePAP today    2  Obesity    - encouraged patient to work on [employer], exercise  and adequate sleep for weight loss  Follow up if more help is needed    Colon - refer  mammo - refer given    F/u pending results  F/u as needed    Subjective:   Chief Complaint   Patient presents with    repeat pap      Patient ID: Valencia Sharma is a 47 y o  female  Patient here for rePAP  Pap back in January was ASCUS HPV negative  Patient had a yeast infection leading up to this and requesting rePAP today  No complaints  Recently started diet and exercise due to her weight         The following portions of the patient's history were reviewed and updated as appropriate: allergies, current medications, past family history, past medical history, past social history, past surgical history and problem list     Past Medical History:   Diagnosis Date    Abnormal weight gain     Anxiety     Arthralgia     Stomatitis     last assessed 7/22/13; resolved 5/4/16     Past Surgical History:   Procedure Laterality Date    CHOLECYSTECTOMY LAPAROSCOPIC       Family History   Problem Relation Age of Onset    Diabetes Mother     Heart failure Father     Coronary artery disease Father     Diabetes Father     Other Father         pure hypercholesterolemia    Pancreatic cancer Paternal Grandmother     Cancer Family     Mental illness Neg Hx     Substance Abuse Neg Hx      Social History     Socioeconomic History    Marital status: /Civil Union     Spouse name: Not on file    Number of children: Not on file    Years of education: Not on file    Highest education level: Not on file   Occupational History    Not on file   Social Needs    Financial resource strain: Not on file    Food insecurity:     Worry: Not on file     Inability: Not on file    Transportation needs:     Medical: Not on file Non-medical: Not on file   Tobacco Use    Smoking status: Never Smoker    Smokeless tobacco: Never Used   Substance and Sexual Activity    Alcohol use: Yes     Alcohol/week: 1 0 standard drinks     Types: 1 Glasses of wine per week     Comment: Social    Drug use: No    Sexual activity: Not on file   Lifestyle    Physical activity:     Days per week: Not on file     Minutes per session: Not on file    Stress: Not on file   Relationships    Social connections:     Talks on phone: Not on file     Gets together: Not on file     Attends Voodoo service: Not on file     Active member of club or organization: Not on file     Attends meetings of clubs or organizations: Not on file     Relationship status: Not on file    Intimate partner violence:     Fear of current or ex partner: Not on file     Emotionally abused: Not on file     Physically abused: Not on file     Forced sexual activity: Not on file   Other Topics Concern    Not on file   Social History Narrative    Caffeine use - Nominal     Uses safety equipment - smoke detectors     Uses seat belts        Current Outpatient Medications:     esomeprazole (NEXIUM) 20 mg capsule, Take 1 capsule by mouth daily, Disp: , Rfl:     sertraline (ZOLOFT) 50 mg tablet, TAKE ONE TABLET BY MOUTH EVERY DAY, Disp: 30 tablet, Rfl: 6    Review of Systems          Objective:    Vitals:    08/27/19 1603   BP: 124/82   BP Location: Left arm   Patient Position: Sitting   Cuff Size: Large   Pulse: 72   Resp: 16   Weight: 98 6 kg (217 lb 4 8 oz)   Height: 5' 4" (1 626 m)        Physical Exam   Constitutional: She is oriented to person, place, and time  She appears well-developed and well-nourished  Cardiovascular: Normal rate, regular rhythm and normal heart sounds  Pulmonary/Chest: Breath sounds normal    Genitourinary: Vagina normal and uterus normal  Cervix exhibits no discharge  Right adnexum displays no mass  Left adnexum displays no mass     Genitourinary Comments: Cervix normal, no discharge, erythema   Neurological: She is alert and oriented to person, place, and time  Skin: Skin is warm  Psychiatric: She has a normal mood and affect  Judgment normal              BMI Counseling: Body mass index is 37 3 kg/m²  Discussed the patient's BMI with her  The BMI is above average  BMI counseling and education was provided to the patient  Nutrition recommendations include reducing portion sizes, decreasing overall calorie intake and reducing fast food intake  Exercise recommendations include moderate aerobic physical activity for 150 minutes/week

## 2019-08-29 LAB
LAB AP GYN PRIMARY INTERPRETATION: NORMAL
Lab: NORMAL

## 2019-10-25 ENCOUNTER — TRANSCRIBE ORDERS (OUTPATIENT)
Dept: MAMMOGRAPHY | Facility: CLINIC | Age: 49
End: 2019-10-25

## 2019-12-17 ENCOUNTER — TELEPHONE (OUTPATIENT)
Dept: FAMILY MEDICINE CLINIC | Facility: CLINIC | Age: 49
End: 2019-12-17

## 2019-12-18 DIAGNOSIS — M79.673 PAIN OF FOOT, UNSPECIFIED LATERALITY: Primary | ICD-10-CM

## 2020-01-17 ENCOUNTER — HOSPITAL ENCOUNTER (OUTPATIENT)
Dept: MAMMOGRAPHY | Facility: CLINIC | Age: 50
Discharge: HOME | End: 2020-01-17
Payer: COMMERCIAL

## 2020-01-17 VITALS — HEIGHT: 64 IN | WEIGHT: 204 LBS | BODY MASS INDEX: 34.83 KG/M2

## 2020-01-17 DIAGNOSIS — Z12.39 SCREENING FOR MALIGNANT NEOPLASM OF BREAST: ICD-10-CM

## 2020-01-17 PROCEDURE — 77067 SCR MAMMO BI INCL CAD: CPT

## 2020-01-17 PROCEDURE — 77063 BREAST TOMOSYNTHESIS BI: CPT

## 2020-08-18 ENCOUNTER — TELEMEDICINE (OUTPATIENT)
Dept: FAMILY MEDICINE CLINIC | Facility: CLINIC | Age: 50
End: 2020-08-18
Payer: COMMERCIAL

## 2020-08-18 VITALS
SYSTOLIC BLOOD PRESSURE: 130 MMHG | BODY MASS INDEX: 33.73 KG/M2 | DIASTOLIC BLOOD PRESSURE: 80 MMHG | WEIGHT: 197.6 LBS | HEIGHT: 64 IN | TEMPERATURE: 96.1 F | HEART RATE: 72 BPM

## 2020-08-18 DIAGNOSIS — J01.00 ACUTE NON-RECURRENT MAXILLARY SINUSITIS: Primary | ICD-10-CM

## 2020-08-18 PROCEDURE — 1036F TOBACCO NON-USER: CPT | Performed by: PHYSICIAN ASSISTANT

## 2020-08-18 PROCEDURE — 3725F SCREEN DEPRESSION PERFORMED: CPT | Performed by: PHYSICIAN ASSISTANT

## 2020-08-18 PROCEDURE — 3008F BODY MASS INDEX DOCD: CPT | Performed by: PHYSICIAN ASSISTANT

## 2020-08-18 PROCEDURE — 99213 OFFICE O/P EST LOW 20 MIN: CPT | Performed by: PHYSICIAN ASSISTANT

## 2020-08-18 RX ORDER — FLUCONAZOLE 150 MG/1
150 TABLET ORAL ONCE
Qty: 1 TABLET | Refills: 0 | Status: SHIPPED | OUTPATIENT
Start: 2020-08-18 | End: 2020-08-18

## 2020-08-18 RX ORDER — AZITHROMYCIN 250 MG/1
TABLET, FILM COATED ORAL
Qty: 6 TABLET | Refills: 0 | Status: SHIPPED | OUTPATIENT
Start: 2020-08-18 | End: 2020-08-22

## 2020-08-18 NOTE — PROGRESS NOTES
Assessment/Plan:            Subjective:   Chief Complaint   Patient presents with    Poss sinus infection      Patient ID: Abbe Espinoza is a 45 y o  female  HPI    {Common ambulatory SmartLinks:[id number]}    Past Medical History:   Diagnosis Date    Abnormal weight gain     Anxiety     Arthralgia     Stomatitis     last assessed 7/22/13; resolved 5/4/16     Past Surgical History:   Procedure Laterality Date    CHOLECYSTECTOMY LAPAROSCOPIC       Family History   Problem Relation Age of Onset    Diabetes Mother     Heart failure Father     Coronary artery disease Father    [place] Diabetes Father     Other Father         pure hypercholesterolemia    Pancreatic cancer Paternal Grandmother     Cancer Family     No Known Problems Daughter     No Known Problems Daughter     No Known Problems Maternal Aunt     No Known Problems Maternal Aunt     Breast cancer Paternal Aunt     Mental illness Neg Hx     Substance Abuse Neg Hx      Social History     Socioeconomic History    Marital status: /Civil Union     Spouse name: Not on file    Number of children: Not on file    Years of education: Not on file    Highest education level: Not on file   Occupational History    Not on file   Social Needs    Financial resource strain: Not on file    Food insecurity     Worry: Not on file     Inability: Not on file    Transportation needs     Medical: Not on file     Non-medical: Not on file   Tobacco Use    Smoking status: Never Smoker    Smokeless tobacco: Never Used   Substance and Sexual Activity    Alcohol use:  Yes     Alcohol/week: 1 0 standard drinks     Types: 1 Glasses of wine per week     Comment: Social    Drug use: No    Sexual activity: Yes     Partners: Male   Lifestyle    Physical activity     Days per week: Not on file     Minutes per session: Not on file    Stress: Not on file   Relationships    Social connections     Talks on phone: Not on file     Gets together: Not on file     Attends Restoration service: Not on file     Active member of club or organization: Not on file     Attends meetings of clubs or organizations: Not on file     Relationship status: Not on file    Intimate partner violence     Fear of current or ex partner: Not on file     Emotionally abused: Not on file     Physically abused: Not on file     Forced sexual activity: Not on file   Other Topics Concern    Not on file   Social History Narrative    Caffeine use - Nominal     Uses safety equipment - smoke detectors     Uses seat belts        Current Outpatient Medications:     APPLE CIDER VINEGAR PO, Take by mouth, Disp: , Rfl:     Ascorbic Acid (VITAMIN C PO), Take by mouth, Disp: , Rfl:     esomeprazole (NEXIUM) 20 mg capsule, Take 1 capsule by mouth daily, Disp: , Rfl:     Multiple Vitamins-Minerals (HAIR SKIN NAILS PO), Take by mouth, Disp: , Rfl:     azithromycin (ZITHROMAX) 250 mg tablet, Take 2 tabs today and 1 tab for the next 4 days with food, Disp: 6 tablet, Rfl: 0    fluconazole (DIFLUCAN) 150 mg tablet, Take 1 tablet (150 mg total) by mouth once for 1 dose, Disp: 1 tablet, Rfl: 0    Review of Systems          Objective:    Vitals:    20 0745   BP: 130/80   BP Location: Left arm   Patient Position: Sitting   Cuff Size: Standard   Pulse: 72   Temp: (!) 96 1 °F (35 6 °C)   TempSrc: Oral   Weight: 89 6 kg (197 lb 9 6 oz)   Height: 5' 4" (1 626 m)        Physical Exam          BMI Counseling: Body mass index is 33 92 kg/m²   The BMI {VB BMI Counselin}

## 2020-08-18 NOTE — PROGRESS NOTES
Virtual Regular Visit      Assessment/Plan:    1  Acute non-recurrent maxillary sinusitis    - fluids, rest, start zpack as prescribed with food, take flonase to keep allergies under control  Diflucan sent per patietn request although try to take yogart with antibiotic to prevent this  - azithromycin (ZITHROMAX) 250 mg tablet; Take 2 tabs today and 1 tab for the next 4 days with food  Dispense: 6 tablet; Refill: 0  - fluconazole (DIFLUCAN) 150 mg tablet; Take 1 tablet (150 mg total) by mouth once for 1 dose  Dispense: 1 tablet; Refill: 0    F/u for physical  F/u as needed    Reason for visit is   Chief Complaint   Patient presents with    Poss sinus infection    Virtual Regular Visit        Encounter provider Debbi Nagy PA-C    Provider located at [address] [place] [address]  [phone]      Recent Visits  No visits were found meeting these conditions  Showing recent visits within past 7 days and meeting all other requirements     Today's Visits  Date Type Provider Dept   08/18/20 Telemedicine Debbi Nagy PA-C [address]   Showing today's visits and meeting all other requirements     Future Appointments  No visits were found meeting these conditions  Showing future appointments within next 150 days and meeting all other requirements        The patient was identified by name and date of birth  Nataliiasuzanna Bernard was informed that this is a telemedicine visit and that the visit is being conducted through [address] [place] [id number] and patient was informed that this is not a secure, HIPAA-complaint platform  She agrees to proceed     My office door was closed  No one else was in the room  She acknowledged consent and understanding of privacy and security of the video platform  The patient has agreed to participate and understands they can discontinue the visit at any time      Patient is aware this is a billable service  Subjective  [facility] is a 45 y o  female  Here complaining of allergies out of control this summer and spring, has been managing with OTC  Then over the weekend began with sinus congestion, pressure behind eyes and in forehead by today upper teeth are aching, fatigue, mucus feels stuck  Feels like "my normal sinus infection" Denies fever, chills, change in sense of smell or taste, cough   Past Medical History:   Diagnosis Date    Abnormal weight gain     Anxiety     Arthralgia     Stomatitis     last assessed 7/22/13; resolved 5/4/16       Past Surgical History:   Procedure Laterality Date    CHOLECYSTECTOMY LAPAROSCOPIC         Current Outpatient Medications   Medication Sig Dispense Refill    APPLE CIDER VINEGAR PO Take by mouth      Ascorbic Acid (VITAMIN C PO) Take by mouth      esomeprazole (NEXIUM) 20 mg capsule Take 1 capsule by mouth daily      Multiple Vitamins-Minerals (HAIR SKIN NAILS PO) Take by mouth      azithromycin (ZITHROMAX) 250 mg tablet Take 2 tabs today and 1 tab for the next 4 days with food 6 tablet 0    fluconazole (DIFLUCAN) 150 mg tablet Take 1 tablet (150 mg total) by mouth once for 1 dose 1 tablet 0     No current facility-administered medications for this visit  Allergies   Allergen Reactions    Codeine Tachycardia    Penicillins Hives    Prednisone Irritability and Hyperactivity    Sulfa Antibiotics Hives    Tetracycline        Review of Systems    Video Exam    Vitals:    08/18/20 0745   BP: 130/80   BP Location: Left arm   Patient Position: Sitting   Cuff Size: Standard   Pulse: 72   Temp: (!) 96 1 °F (35 6 °C)   TempSrc: Oral   Weight: 89 6 kg (197 lb 9 6 oz)   Height: 5' 4" (1 626 m)       Physical Exam  Constitutional:       Appearance: Normal appearance  Pulmonary:      Effort: Pulmonary effort is normal  No respiratory distress  Neurological:      General: No focal deficit present        Mental Status: She is alert and oriented to person, place, and time  Psychiatric:         Mood and Affect: Mood normal          Behavior: Behavior normal          Thought Content: Thought content normal          Judgment: Judgment normal       BMI Counseling: Body mass index is 33 92 kg/m²  The BMI is above normal  Nutrition recommendations include reducing portion sizes and decreasing overall calorie intake  Exercise recommendations include moderate aerobic physical activity for 150 minutes/week  I spent 15 minutes directly with the patient during this visit      VIRTUAL VISIT DISCLAIMER    Jordon Hager acknowledges that she has consented to an online visit or consultation  She understands that the online visit is based solely on information provided by her, and that, in the absence of a face-to-face physical evaluation by the physician, the diagnosis she receives is both limited and provisional in terms of accuracy and completeness  This is not intended to replace a full medical face-to-face evaluation by the physician  Jordon Hager understands and accepts these terms

## 2020-10-02 ENCOUNTER — OFFICE VISIT (OUTPATIENT)
Dept: FAMILY MEDICINE CLINIC | Facility: CLINIC | Age: 50
End: 2020-10-02
Payer: COMMERCIAL

## 2020-10-02 VITALS
SYSTOLIC BLOOD PRESSURE: 134 MMHG | RESPIRATION RATE: 16 BRPM | WEIGHT: 201.2 LBS | HEIGHT: 64 IN | HEART RATE: 92 BPM | DIASTOLIC BLOOD PRESSURE: 80 MMHG | BODY MASS INDEX: 34.35 KG/M2 | TEMPERATURE: 97.4 F

## 2020-10-02 DIAGNOSIS — R73.03 PREDIABETES: ICD-10-CM

## 2020-10-02 DIAGNOSIS — E78.5 HYPERLIPIDEMIA, UNSPECIFIED HYPERLIPIDEMIA TYPE: ICD-10-CM

## 2020-10-02 DIAGNOSIS — L30.9 ECZEMA, UNSPECIFIED TYPE: ICD-10-CM

## 2020-10-02 DIAGNOSIS — Z23 NEED FOR VACCINATION: ICD-10-CM

## 2020-10-02 DIAGNOSIS — R73.9 HYPERGLYCEMIA: ICD-10-CM

## 2020-10-02 DIAGNOSIS — M65.331 TRIGGER MIDDLE FINGER OF RIGHT HAND: Primary | ICD-10-CM

## 2020-10-02 DIAGNOSIS — Z12.11 ENCOUNTER FOR SCREENING FOR MALIGNANT NEOPLASM OF COLON: ICD-10-CM

## 2020-10-02 DIAGNOSIS — D64.9 ANEMIA, UNSPECIFIED TYPE: ICD-10-CM

## 2020-10-02 PROCEDURE — 90471 IMMUNIZATION ADMIN: CPT

## 2020-10-02 PROCEDURE — 90682 RIV4 VACC RECOMBINANT DNA IM: CPT

## 2020-10-02 PROCEDURE — 99214 OFFICE O/P EST MOD 30 MIN: CPT | Performed by: PHYSICIAN ASSISTANT

## 2020-10-02 RX ORDER — PIMECROLIMUS 10 MG/G
CREAM TOPICAL 2 TIMES DAILY
Qty: 30 G | Refills: 1 | Status: SHIPPED | OUTPATIENT
Start: 2020-10-02 | End: 2021-07-09

## 2020-10-22 ENCOUNTER — OFFICE VISIT (OUTPATIENT)
Dept: OBGYN CLINIC | Facility: CLINIC | Age: 50
End: 2020-10-22
Payer: COMMERCIAL

## 2020-10-22 VITALS
TEMPERATURE: 98.6 F | HEIGHT: 64 IN | WEIGHT: 197 LBS | DIASTOLIC BLOOD PRESSURE: 98 MMHG | BODY MASS INDEX: 33.63 KG/M2 | HEART RATE: 98 BPM | SYSTOLIC BLOOD PRESSURE: 160 MMHG

## 2020-10-22 DIAGNOSIS — M65.331 TRIGGER MIDDLE FINGER OF RIGHT HAND: ICD-10-CM

## 2020-10-22 PROCEDURE — 20550 NJX 1 TENDON SHEATH/LIGAMENT: CPT | Performed by: SURGERY

## 2020-10-22 PROCEDURE — 99243 OFF/OP CNSLTJ NEW/EST LOW 30: CPT | Performed by: SURGERY

## 2020-10-22 PROCEDURE — 1036F TOBACCO NON-USER: CPT | Performed by: SURGERY

## 2020-10-22 RX ORDER — LIDOCAINE HYDROCHLORIDE 10 MG/ML
1 INJECTION, SOLUTION INFILTRATION; PERINEURAL
Status: COMPLETED | OUTPATIENT
Start: 2020-10-22 | End: 2020-10-22

## 2020-10-22 RX ORDER — DEXAMETHASONE SODIUM PHOSPHATE 100 MG/10ML
40 INJECTION INTRAMUSCULAR; INTRAVENOUS
Status: COMPLETED | OUTPATIENT
Start: 2020-10-22 | End: 2020-10-22

## 2020-10-22 RX ADMIN — LIDOCAINE HYDROCHLORIDE 1 ML: 10 INJECTION, SOLUTION INFILTRATION; PERINEURAL at 10:08

## 2020-10-22 RX ADMIN — DEXAMETHASONE SODIUM PHOSPHATE 40 MG: 100 INJECTION INTRAMUSCULAR; INTRAVENOUS at 10:08

## 2020-11-13 ENCOUNTER — TRANSCRIBE ORDERS (OUTPATIENT)
Dept: MAMMOGRAPHY | Facility: CLINIC | Age: 50
End: 2020-11-13

## 2020-11-13 DIAGNOSIS — Z12.31 SCREENING MAMMOGRAM, ENCOUNTER FOR: Primary | ICD-10-CM

## 2020-11-17 ENCOUNTER — TELEPHONE (OUTPATIENT)
Dept: OBGYN CLINIC | Facility: HOSPITAL | Age: 50
End: 2020-11-17

## 2020-11-30 ENCOUNTER — CLINICAL SUPPORT (OUTPATIENT)
Dept: FAMILY MEDICINE CLINIC | Facility: CLINIC | Age: 50
End: 2020-11-30
Payer: COMMERCIAL

## 2020-11-30 ENCOUNTER — TELEPHONE (OUTPATIENT)
Dept: FAMILY MEDICINE CLINIC | Facility: CLINIC | Age: 50
End: 2020-11-30

## 2020-11-30 DIAGNOSIS — R39.9 URINARY SYMPTOM OR SIGN: Primary | ICD-10-CM

## 2020-11-30 LAB
SL AMB  POCT GLUCOSE, UA: NEGATIVE
SL AMB LEUKOCYTE ESTERASE,UA: NEGATIVE
SL AMB POCT BILIRUBIN,UA: NEGATIVE
SL AMB POCT BLOOD,UA: ABNORMAL
SL AMB POCT CLARITY,UA: ABNORMAL
SL AMB POCT COLOR,UA: ABNORMAL
SL AMB POCT KETONES,UA: NEGATIVE
SL AMB POCT NITRITE,UA: NEGATIVE
SL AMB POCT PH,UA: 5
SL AMB POCT SPECIFIC GRAVITY,UA: 1.02
SL AMB POCT URINE PROTEIN: NEGATIVE
SL AMB POCT UROBILINOGEN: 0.2

## 2020-11-30 PROCEDURE — 81002 URINALYSIS NONAUTO W/O SCOPE: CPT

## 2020-11-30 PROCEDURE — 87086 URINE CULTURE/COLONY COUNT: CPT | Performed by: PHYSICIAN ASSISTANT

## 2020-12-01 DIAGNOSIS — R31.9 URINARY TRACT INFECTION WITH HEMATURIA, SITE UNSPECIFIED: Primary | ICD-10-CM

## 2020-12-01 DIAGNOSIS — N39.0 URINARY TRACT INFECTION WITH HEMATURIA, SITE UNSPECIFIED: Primary | ICD-10-CM

## 2020-12-01 LAB — BACTERIA UR CULT: NORMAL

## 2020-12-01 RX ORDER — CIPROFLOXACIN 250 MG/1
250 TABLET, FILM COATED ORAL EVERY 12 HOURS SCHEDULED
Qty: 14 TABLET | Refills: 0 | Status: SHIPPED | OUTPATIENT
Start: 2020-12-01 | End: 2020-12-08

## 2020-12-11 ENCOUNTER — AMB VIDEO VISIT (OUTPATIENT)
Dept: OTHER | Facility: HOSPITAL | Age: 50
End: 2020-12-11
Payer: COMMERCIAL

## 2020-12-11 PROCEDURE — 99201 PR OFFICE OUTPATIENT NEW 10 MINUTES: CPT | Performed by: FAMILY MEDICINE

## 2020-12-27 ENCOUNTER — IMMUNIZATIONS (OUTPATIENT)
Dept: FAMILY MEDICINE CLINIC | Facility: HOSPITAL | Age: 50
End: 2020-12-27
Payer: COMMERCIAL

## 2020-12-27 DIAGNOSIS — Z23 ENCOUNTER FOR IMMUNIZATION: ICD-10-CM

## 2020-12-27 PROCEDURE — 0011A SARS-COV-2 / COVID-19 MRNA VACCINE (MODERNA) 100 MCG: CPT

## 2020-12-27 PROCEDURE — 91301 SARS-COV-2 / COVID-19 MRNA VACCINE (MODERNA) 100 MCG: CPT

## 2021-01-20 ENCOUNTER — HOSPITAL ENCOUNTER (OUTPATIENT)
Dept: MAMMOGRAPHY | Facility: CLINIC | Age: 51
Discharge: HOME | End: 2021-01-20

## 2021-01-20 DIAGNOSIS — Z12.31 SCREENING MAMMOGRAM, ENCOUNTER FOR: ICD-10-CM

## 2021-01-21 ENCOUNTER — HOSPITAL ENCOUNTER (OUTPATIENT)
Dept: MAMMOGRAPHY | Facility: CLINIC | Age: 51
Discharge: HOME | End: 2021-01-21
Payer: COMMERCIAL

## 2021-01-21 VITALS — HEIGHT: 64 IN | WEIGHT: 197 LBS | BODY MASS INDEX: 33.63 KG/M2

## 2021-01-21 PROCEDURE — 77063 BREAST TOMOSYNTHESIS BI: CPT

## 2021-01-21 PROCEDURE — 77067 SCR MAMMO BI INCL CAD: CPT

## 2021-01-22 ENCOUNTER — IMMUNIZATIONS (OUTPATIENT)
Dept: FAMILY MEDICINE CLINIC | Facility: HOSPITAL | Age: 51
End: 2021-01-22

## 2021-01-22 DIAGNOSIS — Z23 ENCOUNTER FOR IMMUNIZATION: Primary | ICD-10-CM

## 2021-01-22 PROCEDURE — 0012A SARS-COV-2 / COVID-19 MRNA VACCINE (MODERNA) 100 MCG: CPT

## 2021-01-22 PROCEDURE — 91301 SARS-COV-2 / COVID-19 MRNA VACCINE (MODERNA) 100 MCG: CPT

## 2021-06-04 ENCOUNTER — VBI (OUTPATIENT)
Dept: ADMINISTRATIVE | Facility: OTHER | Age: 51
End: 2021-06-04

## 2021-07-09 ENCOUNTER — OFFICE VISIT (OUTPATIENT)
Dept: FAMILY MEDICINE CLINIC | Facility: CLINIC | Age: 51
End: 2021-07-09
Payer: COMMERCIAL

## 2021-07-09 ENCOUNTER — APPOINTMENT (OUTPATIENT)
Dept: LAB | Facility: HOSPITAL | Age: 51
End: 2021-07-09
Payer: COMMERCIAL

## 2021-07-09 VITALS
WEIGHT: 210 LBS | BODY MASS INDEX: 35.85 KG/M2 | HEART RATE: 72 BPM | RESPIRATION RATE: 18 BRPM | DIASTOLIC BLOOD PRESSURE: 84 MMHG | HEIGHT: 64 IN | SYSTOLIC BLOOD PRESSURE: 124 MMHG | TEMPERATURE: 98.1 F

## 2021-07-09 DIAGNOSIS — D64.9 ANEMIA, UNSPECIFIED TYPE: ICD-10-CM

## 2021-07-09 DIAGNOSIS — R73.9 HYPERGLYCEMIA: ICD-10-CM

## 2021-07-09 DIAGNOSIS — M25.552 LEFT HIP PAIN: ICD-10-CM

## 2021-07-09 DIAGNOSIS — F41.9 ANXIETY: Primary | ICD-10-CM

## 2021-07-09 DIAGNOSIS — E78.5 HYPERLIPIDEMIA, UNSPECIFIED HYPERLIPIDEMIA TYPE: ICD-10-CM

## 2021-07-09 DIAGNOSIS — M54.50 ACUTE LEFT-SIDED LOW BACK PAIN WITHOUT SCIATICA: ICD-10-CM

## 2021-07-09 LAB
ALBUMIN SERPL BCP-MCNC: 3.8 G/DL (ref 3.5–5)
ALP SERPL-CCNC: 62 U/L (ref 46–116)
ALT SERPL W P-5'-P-CCNC: 31 U/L (ref 12–78)
ANION GAP SERPL CALCULATED.3IONS-SCNC: 6 MMOL/L (ref 4–13)
AST SERPL W P-5'-P-CCNC: 16 U/L (ref 5–45)
BACTERIA UR QL AUTO: ABNORMAL /HPF
BASOPHILS # BLD AUTO: 0.04 THOUSANDS/ΜL (ref 0–0.1)
BASOPHILS NFR BLD AUTO: 0 % (ref 0–1)
BILIRUB SERPL-MCNC: 0.43 MG/DL (ref 0.2–1)
BILIRUB UR QL STRIP: NEGATIVE
BUN SERPL-MCNC: 12 MG/DL (ref 5–25)
CALCIUM SERPL-MCNC: 9.4 MG/DL (ref 8.3–10.1)
CHLORIDE SERPL-SCNC: 106 MMOL/L (ref 100–108)
CHOLEST SERPL-MCNC: 205 MG/DL (ref 50–200)
CLARITY UR: ABNORMAL
CO2 SERPL-SCNC: 28 MMOL/L (ref 21–32)
COLOR UR: YELLOW
CREAT SERPL-MCNC: 0.72 MG/DL (ref 0.6–1.3)
EOSINOPHIL # BLD AUTO: 0.18 THOUSAND/ΜL (ref 0–0.61)
EOSINOPHIL NFR BLD AUTO: 2 % (ref 0–6)
ERYTHROCYTE [DISTWIDTH] IN BLOOD BY AUTOMATED COUNT: 13.2 % (ref 11.6–15.1)
EST. AVERAGE GLUCOSE BLD GHB EST-MCNC: 126 MG/DL
GFR SERPL CREATININE-BSD FRML MDRD: 97 ML/MIN/1.73SQ M
GLUCOSE P FAST SERPL-MCNC: 86 MG/DL (ref 65–99)
GLUCOSE UR STRIP-MCNC: NEGATIVE MG/DL
HBA1C MFR BLD: 6 %
HCT VFR BLD AUTO: 44.4 % (ref 34.8–46.1)
HDLC SERPL-MCNC: 51 MG/DL
HGB BLD-MCNC: 14.5 G/DL (ref 11.5–15.4)
HGB UR QL STRIP.AUTO: NEGATIVE
IMM GRANULOCYTES # BLD AUTO: 0.03 THOUSAND/UL (ref 0–0.2)
IMM GRANULOCYTES NFR BLD AUTO: 0 % (ref 0–2)
KETONES UR STRIP-MCNC: NEGATIVE MG/DL
LDLC SERPL CALC-MCNC: 133 MG/DL (ref 0–100)
LEUKOCYTE ESTERASE UR QL STRIP: ABNORMAL
LYMPHOCYTES # BLD AUTO: 3.9 THOUSANDS/ΜL (ref 0.6–4.47)
LYMPHOCYTES NFR BLD AUTO: 37 % (ref 14–44)
MCH RBC QN AUTO: 28.8 PG (ref 26.8–34.3)
MCHC RBC AUTO-ENTMCNC: 32.7 G/DL (ref 31.4–37.4)
MCV RBC AUTO: 88 FL (ref 82–98)
MONOCYTES # BLD AUTO: 0.66 THOUSAND/ΜL (ref 0.17–1.22)
MONOCYTES NFR BLD AUTO: 6 % (ref 4–12)
NEUTROPHILS # BLD AUTO: 5.68 THOUSANDS/ΜL (ref 1.85–7.62)
NEUTS SEG NFR BLD AUTO: 55 % (ref 43–75)
NITRITE UR QL STRIP: NEGATIVE
NON-SQ EPI CELLS URNS QL MICRO: ABNORMAL /HPF
NRBC BLD AUTO-RTO: 0 /100 WBCS
PH UR STRIP.AUTO: 7 [PH]
PLATELET # BLD AUTO: 227 THOUSANDS/UL (ref 149–390)
PMV BLD AUTO: 10.2 FL (ref 8.9–12.7)
POTASSIUM SERPL-SCNC: 3.4 MMOL/L (ref 3.5–5.3)
PROT SERPL-MCNC: 8.2 G/DL (ref 6.4–8.2)
PROT UR STRIP-MCNC: NEGATIVE MG/DL
RBC # BLD AUTO: 5.03 MILLION/UL (ref 3.81–5.12)
RBC #/AREA URNS AUTO: ABNORMAL /HPF
SODIUM SERPL-SCNC: 140 MMOL/L (ref 136–145)
SP GR UR STRIP.AUTO: 1.02 (ref 1–1.03)
TRIGL SERPL-MCNC: 105 MG/DL
TSH SERPL DL<=0.05 MIU/L-ACNC: 1.93 UIU/ML (ref 0.36–3.74)
UROBILINOGEN UR QL STRIP.AUTO: 0.2 E.U./DL
WBC # BLD AUTO: 10.49 THOUSAND/UL (ref 4.31–10.16)
WBC #/AREA URNS AUTO: ABNORMAL /HPF

## 2021-07-09 PROCEDURE — 85025 COMPLETE CBC W/AUTO DIFF WBC: CPT

## 2021-07-09 PROCEDURE — 99214 OFFICE O/P EST MOD 30 MIN: CPT | Performed by: PHYSICIAN ASSISTANT

## 2021-07-09 PROCEDURE — 84443 ASSAY THYROID STIM HORMONE: CPT

## 2021-07-09 PROCEDURE — 36415 COLL VENOUS BLD VENIPUNCTURE: CPT

## 2021-07-09 PROCEDURE — 3725F SCREEN DEPRESSION PERFORMED: CPT | Performed by: PHYSICIAN ASSISTANT

## 2021-07-09 PROCEDURE — 80061 LIPID PANEL: CPT

## 2021-07-09 PROCEDURE — 80053 COMPREHEN METABOLIC PANEL: CPT

## 2021-07-09 PROCEDURE — 81001 URINALYSIS AUTO W/SCOPE: CPT

## 2021-07-09 PROCEDURE — 83036 HEMOGLOBIN GLYCOSYLATED A1C: CPT

## 2021-07-09 RX ORDER — ESCITALOPRAM OXALATE 10 MG/1
10 TABLET ORAL DAILY
Qty: 90 TABLET | Refills: 3 | Status: SHIPPED | OUTPATIENT
Start: 2021-07-09 | End: 2021-12-23 | Stop reason: CLARIF

## 2021-07-09 NOTE — PROGRESS NOTES
Assessment/Plan:    1  Anxiety    - escitalopram (LEXAPRO) 10 mg tablet; Take 1 tablet (10 mg total) by mouth daily  Dispense: 90 tablet; Refill: 3  - Ambulatory referral to behavioral health therapists; Future    2  Acute left-sided low back pain without sciatica    - Ambulatory referral to Physical Therapy; Future    3  Left hip pain    - Ambulatory referral to Physical Therapy; Future    F/u as needed    Subjective:   Chief Complaint   Patient presents with    Leg Pain      Patient ID: Clement Ayers is a 45 y o  female  A month ago was lifting a heavy [employer] box at home and the flap ran into left inner thigh, had two bruises in that area  While driving felt off and on pain entire inner thigh into pelvic  Went to Urgent Care and was told it was just trauma from box  Ever since then leg aches and low back pain  Radiates into hip, thigh, into leg  Comes and goes  With exertion will get worse  Two days ago with raking had pain, then rested two days and improved  Taking Advil with some relief but continues to return  Patients anxiety is very high, has been stopping her from doing her normal ADL  Denies SI, HI  Mother dx with breast cancer,  had covid and was inpatient  Nerves shot         The following portions of the patient's history were reviewed and updated as appropriate: allergies, current medications, past family history, past medical history, past social history, past surgical history and problem list     Past Medical History:   Diagnosis Date    Abnormal weight gain     Anxiety     Arthralgia     Stomatitis     last assessed 7/22/13; resolved 5/4/16     Past Surgical History:   Procedure Laterality Date    CHOLECYSTECTOMY LAPAROSCOPIC       Family History   Problem Relation Age of Onset    Diabetes Mother     Breast cancer Mother     Heart failure Father     Coronary artery disease Father     Diabetes Father     Other Father         pure hypercholesterolemia    Pancreatic cancer Paternal Grandmother     Cancer Family     No Known Problems Daughter     No Known Problems Daughter     No Known Problems Maternal Aunt     No Known Problems Maternal Aunt     Breast cancer Paternal Aunt     Mental illness Neg Hx     Substance Abuse Neg Hx      Social History     Socioeconomic History    Marital status: /Civil Union     Spouse name: Not on file    Number of children: Not on file    Years of education: Not on file    Highest education level: Not on file   Occupational History    Not on file   Tobacco Use    Smoking status: Never Smoker    Smokeless tobacco: Never Used   Vaping Use    Vaping Use: Never used   Substance and Sexual Activity    Alcohol use: Yes     Alcohol/week: 1 0 standard drinks     Types: 1 Glasses of wine per week     Comment: Social    Drug use: No    Sexual activity: Yes     Partners: Male   Other Topics Concern    Not on file   Social History Narrative    Caffeine use - Nominal     Uses safety equipment - smoke detectors     Uses seat belts      Social Determinants of Health     Financial Resource Strain:     Difficulty of Paying Living Expenses:    Food Insecurity:     Worried About Running Out of Food in the Last Year:     Ran Out of Food in the Last Year:    Transportation Needs:     Lack of Transportation (Medical):      Lack of Transportation (Non-Medical):    Physical Activity:     Days of Exercise per Week:     Minutes of Exercise per Session:    Stress:     Feeling of Stress :    Social Connections:     Frequency of Communication with Friends and Family:     Frequency of Social Gatherings with Friends and Family:     Attends Orthodox Services:     Active Member of Clubs or Organizations:     Attends Club or Organization Meetings:     Marital Status:    Intimate Partner Violence:     Fear of Current or Ex-Partner:     Emotionally Abused:     Physically Abused:     Sexually Abused:        Current Outpatient Medications:    APPLE CIDER VINEGAR PO, Take by mouth, Disp: , Rfl:     Ascorbic Acid (VITAMIN C PO), Take by mouth, Disp: , Rfl:     esomeprazole (NEXIUM) 20 mg capsule, Take 1 capsule by mouth daily, Disp: , Rfl:     Multiple Vitamins-Minerals (HAIR SKIN NAILS PO), Take by mouth, Disp: , Rfl:     Review of Systems          Objective:    Vitals:    07/09/21 1030   BP: 124/84   Pulse: 72   Resp: 18   Temp: 98 1 °F (36 7 °C)   TempSrc: Oral   Weight: 95 3 kg (210 lb)   Height: 5' 3 75" (1 619 m)        Physical Exam  Constitutional:       Appearance: Normal appearance  She is well-developed  She is obese  Cardiovascular:      Rate and Rhythm: Normal rate and regular rhythm  Pulses: Normal pulses  Heart sounds: Normal heart sounds  Pulmonary:      Effort: Pulmonary effort is normal       Breath sounds: Normal breath sounds  Musculoskeletal:      Right lower leg: No edema  Left lower leg: No edema  Comments: Left lower lumbar region, SI joint tender, lateral left hip tender, full ROM, full strength, SLR negative, heel/toe walk normal   Skin:     General: Skin is warm  Neurological:      General: No focal deficit present  Mental Status: She is alert and oriented to person, place, and time  Motor: No weakness  Coordination: Coordination normal       Gait: Gait normal       Deep Tendon Reflexes: Reflexes normal    Psychiatric:         Mood and Affect: Mood normal          Behavior: Behavior normal          Thought Content: Thought content normal          Judgment: Judgment normal            BMI Counseling: Body mass index is 36 33 kg/m²  The BMI is above normal  Nutrition recommendations include reducing portion sizes

## 2021-07-12 ENCOUNTER — TELEPHONE (OUTPATIENT)
Dept: FAMILY MEDICINE CLINIC | Facility: CLINIC | Age: 51
End: 2021-07-12

## 2021-07-12 NOTE — TELEPHONE ENCOUNTER
Yuriy Coats called and said that she saw her urine result on My Chart and said to ask you to review it  She also said that she thinks PT sent a letter to you

## 2021-07-12 NOTE — TELEPHONE ENCOUNTER
Her urine looks like she may have been around her period? Is that accurate? Yes, her PT, Nancy, sent my a note   It sounded like she wanted me to see Clement Cuellar again before she began PT

## 2021-07-12 NOTE — TELEPHONE ENCOUNTER
Adrian Shields said she has not had a period since May  Also, you have no appointments open for this week and Adrian Shields says PT thinks she needs an antibiotic  Where should I put her?

## 2021-07-13 ENCOUNTER — OFFICE VISIT (OUTPATIENT)
Dept: FAMILY MEDICINE CLINIC | Facility: CLINIC | Age: 51
End: 2021-07-13
Payer: COMMERCIAL

## 2021-07-13 VITALS
HEIGHT: 64 IN | DIASTOLIC BLOOD PRESSURE: 88 MMHG | HEART RATE: 74 BPM | WEIGHT: 212.7 LBS | RESPIRATION RATE: 14 BRPM | BODY MASS INDEX: 36.31 KG/M2 | SYSTOLIC BLOOD PRESSURE: 144 MMHG

## 2021-07-13 DIAGNOSIS — D72.829 LEUKOCYTOSIS, UNSPECIFIED TYPE: ICD-10-CM

## 2021-07-13 DIAGNOSIS — M79.605 LEG PAIN, CENTRAL, LEFT: ICD-10-CM

## 2021-07-13 DIAGNOSIS — R73.9 HYPERGLYCEMIA: ICD-10-CM

## 2021-07-13 DIAGNOSIS — E78.49 OTHER HYPERLIPIDEMIA: ICD-10-CM

## 2021-07-13 DIAGNOSIS — R31.9 HEMATURIA, UNSPECIFIED TYPE: Primary | ICD-10-CM

## 2021-07-13 PROCEDURE — 1036F TOBACCO NON-USER: CPT | Performed by: PHYSICIAN ASSISTANT

## 2021-07-13 PROCEDURE — 99214 OFFICE O/P EST MOD 30 MIN: CPT | Performed by: PHYSICIAN ASSISTANT

## 2021-07-13 PROCEDURE — 3008F BODY MASS INDEX DOCD: CPT | Performed by: PHYSICIAN ASSISTANT

## 2021-07-13 NOTE — PROGRESS NOTES
Assessment/Plan:    1  Hematuria, unspecified type    - patient is perimenopausal, last menses May, had intercourse with  the day prior to urinalysis, most likely due to vaginal contamination, will repeat 2 weeks after next period  - UA (URINE) with reflex to Scope; Future    2  Hyperglycemia    - A1C down from 6 4% to 6%, keep up the good work, healthy diet and repeat in 6 months    3  Other hyperlipidemia    - significant improvement, continue with healthy lifestyle, diet, recheck 1 year    4  Leg pain, central, left    - reassurance, most likely a muscle contusion or hematoma, will take 6-8 weeks    5  Leukocytosis, unspecified type    - minimal elevation, has been this way in past, will continue to monitor and consider referral to heme          Subjective:   Chief Complaint   Patient presents with    Follow-up      Patient ID: Laymon Fabry is a 45 y o  female  Patient here in follow up  Was seen last Friday, had an appointment with PT for her left low back and left hip pain s/p a fall  PT concerned because the initial area where the box hit patients leg 4 weeks ago is still very sore, PT thought unproportionally to where it should be  PT thought it felt warm also like possible infection        The following portions of the patient's history were reviewed and updated as appropriate: allergies, current medications, past family history, past medical history, past social history, past surgical history and problem list     Past Medical History:   Diagnosis Date    Abnormal weight gain     Anxiety     Arthralgia     Stomatitis     last assessed 7/22/13; resolved 5/4/16     Past Surgical History:   Procedure Laterality Date    CHOLECYSTECTOMY LAPAROSCOPIC       Family History   Problem Relation Age of Onset    Diabetes Mother     Breast cancer Mother     Heart failure Father     Coronary artery disease Father     Diabetes Father     Other Father         pure hypercholesterolemia    Pancreatic   APPLE CIDER VINEGAR PO, Take by mouth, Disp: , Rfl:     Ascorbic Acid (VITAMIN C PO), Take by mouth, Disp: , Rfl:     escitalopram (LEXAPRO) 10 mg tablet, Take 1 tablet (10 mg total) by mouth daily, Disp: 90 tablet, Rfl: 3    esomeprazole (NEXIUM) 20 mg capsule, Take 1 capsule by mouth daily, Disp: , Rfl:     Multiple Vitamins-Minerals (HAIR SKIN NAILS PO), Take by mouth, Disp: , Rfl:     Review of Systems          Objective:    Vitals:    07/13/21 1631   BP: 144/88   Pulse: 74   Resp: 14   Weight: 96 5 kg (212 lb 11 2 oz)   Height: 5' 3 75" (1 619 m)        Physical Exam  Constitutional:       Appearance: Normal appearance  Cardiovascular:      Rate and Rhythm: Normal rate and regular rhythm  Pulmonary:      Effort: Pulmonary effort is normal       Breath sounds: Normal breath sounds  Skin:     Comments: Left inner thigh normal in appearance, not warm, no erythema, slightly tender  Neurological:      General: No focal deficit present  Mental Status: She is alert and oriented to person, place, and time  Psychiatric:         Mood and Affect: Mood normal          Behavior: Behavior normal          Appointment on 07/09/2021   Component Date Value Ref Range Status    Cholesterol 07/09/2021 205* 50 - 200 mg/dL Final    Cholesterol:       Desirable         <200 mg/dl       Borderline         200-239 mg/dl       High              >239           Triglycerides 07/09/2021 105  <=150 mg/dL Final    Triglyceride:     Normal          <150 mg/dl     Borderline High 150-199 mg/dl     High            200-499 mg/dl        Very High       >499 mg/dl    Specimen collection should occur prior to N-Acetylcysteine or Metamizole administration due to the potential for falsely depressed results   HDL, Direct 07/09/2021 51  >=40 mg/dL Final    HDL Cholesterol:       Low     <41 mg/dL  Specimen collection should occur prior to Metamizole administration due to the potential for falsley depressed results      LDL Calculated 07/09/2021 133* 0 - 100 mg/dL Final    LDL Cholesterol:     Optimal           <100 mg/dl     Near Optimal      100-129 mg/dl     Above Optimal       Borderline High 130-159 mg/dl       High            160-189 mg/dl       Very High       >189 mg/dl         This screening LDL is a calculated result  It does not have the accuracy of the Direct Measured LDL in the monitoring of patients with hyperlipidemia and/or statin therapy  Direct Measure LDL (HJE903) must be ordered separately in these patients   Sodium 07/09/2021 140  136 - 145 mmol/L Final    Potassium 07/09/2021 3 4* 3 5 - 5 3 mmol/L Final    Chloride 07/09/2021 106  100 - 108 mmol/L Final    CO2 07/09/2021 28  21 - 32 mmol/L Final    ANION GAP 07/09/2021 6  4 - 13 mmol/L Final    BUN 07/09/2021 12  5 - 25 mg/dL Final    Creatinine 07/09/2021 0 72  0 60 - 1 30 mg/dL Final    Standardized to IDMS reference method    Glucose, Fasting 07/09/2021 86  65 - 99 mg/dL Final    Specimen collection should occur prior to Sulfasalazine administration due to the potential for falsely depressed results  Specimen collection should occur prior to Sulfapyridine administration due to the potential for falsely elevated results   Calcium 07/09/2021 9 4  8 3 - 10 1 mg/dL Final    AST 07/09/2021 16  5 - 45 U/L Final    Specimen collection should occur prior to Sulfasalazine administration due to the potential for falsely depressed results   ALT 07/09/2021 31  12 - 78 U/L Final    Specimen collection should occur prior to Sulfasalazine and/or Sulfapyridine administration due to the potential for falsely depressed results       Alkaline Phosphatase 07/09/2021 62  46 - 116 U/L Final    Total Protein 07/09/2021 8 2  6 4 - 8 2 g/dL Final    Albumin 07/09/2021 3 8  3 5 - 5 0 g/dL Final    Total Bilirubin 07/09/2021 0 43  0 20 - 1 00 mg/dL Final    Use of this assay is not recommended for patients undergoing treatment with eltrombopag due to the potential for falsely elevated results   eGFR 07/09/2021 97  ml/min/1 73sq m Final    WBC 07/09/2021 10 49* 4 31 - 10 16 Thousand/uL Final    RBC 07/09/2021 5 03  3 81 - 5 12 Million/uL Final    Hemoglobin 07/09/2021 14 5  11 5 - 15 4 g/dL Final    Hematocrit 07/09/2021 44 4  34 8 - 46 1 % Final    MCV 07/09/2021 88  82 - 98 fL Final    MCH 07/09/2021 28 8  26 8 - 34 3 pg Final    MCHC 07/09/2021 32 7  31 4 - 37 4 g/dL Final    RDW 07/09/2021 13 2  11 6 - 15 1 % Final    MPV 07/09/2021 10 2  8 9 - 12 7 fL Final    Platelets 11/00/1700 227  149 - 390 Thousands/uL Final    nRBC 07/09/2021 0  /100 WBCs Final    Neutrophils Relative 07/09/2021 55  43 - 75 % Final    Immat GRANS % 07/09/2021 0  0 - 2 % Final    Lymphocytes Relative 07/09/2021 37  14 - 44 % Final    Monocytes Relative 07/09/2021 6  4 - 12 % Final    Eosinophils Relative 07/09/2021 2  0 - 6 % Final    Basophils Relative 07/09/2021 0  0 - 1 % Final    Neutrophils Absolute 07/09/2021 5 68  1 85 - 7 62 Thousands/µL Final    Immature Grans Absolute 07/09/2021 0 03  0 00 - 0 20 Thousand/uL Final    Lymphocytes Absolute 07/09/2021 3 90  0 60 - 4 47 Thousands/µL Final    Monocytes Absolute 07/09/2021 0 66  0 17 - 1 22 Thousand/µL Final    Eosinophils Absolute 07/09/2021 0 18  0 00 - 0 61 Thousand/µL Final    Basophils Absolute 07/09/2021 0 04  0 00 - 0 10 Thousands/µL Final    TSH 3RD GENERATON 07/09/2021 1 930  0 358 - 3 740 uIU/mL Final    The recommended reference ranges for TSH during pregnancy are as follows:   First trimester 0 1 to 2 5 uIU/mL   Second trimester  0 2 to 3 0 uIU/mL   Third trimester 0 3 to 3 0 uIU/m    Note: Normal ranges may not apply to patients who are transgender, non-binary, or whose legal sex, sex at birth, and gender identity differ      Color,  07/09/2021 Yellow   Final    Clarity,  07/09/2021 Cloudy   Final    Specific Gravity,  07/09/2021 1 018  1 003 - 1 030 Final    pH,  07/09/2021 7 0  4 5, 5  0, 5 5, 6 0, 6 5, 7 0, 7 5, 8 0 Final    Leukocytes, UA 07/09/2021 Large* Negative Final    Nitrite, UA 07/09/2021 Negative  Negative Final    Protein, UA 07/09/2021 Negative  Negative mg/dl Final    Glucose, UA 07/09/2021 Negative  Negative mg/dl Final    Ketones, UA 07/09/2021 Negative  Negative mg/dl Final    Urobilinogen, UA 07/09/2021 0 2  0 2, 1 0 E U /dl E U /dl Final    Bilirubin, UA 07/09/2021 Negative  Negative Final    Blood, UA 07/09/2021 Negative  Negative Final    Hemoglobin A1C 07/09/2021 6 0* Normal 3 8-5 6%; PreDiabetic 5 7-6 4%;  Diabetic >=6 5%; Glycemic control for adults with diabetes <7 0% % Final    EAG 07/09/2021 126  mg/dl Final    RBC, UA 07/09/2021 4-10* None Seen, 2-4 /hpf Final    WBC, UA 07/09/2021 20-30* None Seen, 2-4, 5-60 /hpf Final    Epithelial Cells 07/09/2021 Moderate* None Seen, Occasional /hpf Final    Bacteria, UA 07/09/2021 Occasional  None Seen, Occasional /hpf Final   ]

## 2021-09-28 ENCOUNTER — TELEMEDICINE (OUTPATIENT)
Dept: FAMILY MEDICINE CLINIC | Facility: CLINIC | Age: 51
End: 2021-09-28
Payer: COMMERCIAL

## 2021-09-28 VITALS — HEIGHT: 64 IN | WEIGHT: 213 LBS | BODY MASS INDEX: 36.37 KG/M2

## 2021-09-28 DIAGNOSIS — L30.9 ECZEMA, UNSPECIFIED TYPE: ICD-10-CM

## 2021-09-28 DIAGNOSIS — J01.00 ACUTE NON-RECURRENT MAXILLARY SINUSITIS: Primary | ICD-10-CM

## 2021-09-28 PROCEDURE — 3008F BODY MASS INDEX DOCD: CPT | Performed by: PHYSICIAN ASSISTANT

## 2021-09-28 PROCEDURE — 1036F TOBACCO NON-USER: CPT | Performed by: PHYSICIAN ASSISTANT

## 2021-09-28 PROCEDURE — 99213 OFFICE O/P EST LOW 20 MIN: CPT | Performed by: PHYSICIAN ASSISTANT

## 2021-09-28 RX ORDER — MOMETASONE FUROATE 1 MG/G
CREAM TOPICAL DAILY
Qty: 45 G | Refills: 0 | Status: SHIPPED | OUTPATIENT
Start: 2021-09-28

## 2021-09-28 RX ORDER — AZITHROMYCIN 250 MG/1
TABLET, FILM COATED ORAL
Qty: 6 TABLET | Refills: 0 | Status: SHIPPED | OUTPATIENT
Start: 2021-09-28 | End: 2021-10-03

## 2021-09-28 NOTE — PROGRESS NOTES
Virtual Regular Visit    Verification of patient location:    Patient is located in the following state in which I hold an active license [place]      Assessment/Plan:    1  Acute non-recurrent maxillary sinusitis    - start zpack as prescribed, fluids, rest, continue with nasacort for allergies  - azithromycin (Zithromax) 250 mg tablet; Take 2 tablets (500 mg total) by mouth daily for 1 day, THEN 1 tablet (250 mg total) daily for 4 days  Dispense: 6 tablet; Refill: 0    2  Eczema, unspecified type    - can apply to ear as needed  - mometasone (ELOCON) 0 1 % cream; Apply topically daily  Dispense: 45 g; Refill: 0    F/u as needed       Reason for visit is   Chief Complaint   Patient presents with    Poss sinusitis    Virtual Regular Visit        Encounter provider Martin Johns PA-C    Provider located at [address] [place] [address]  [phone]      Recent Visits  No visits were found meeting these conditions  Showing recent visits within past 7 days and meeting all other requirements  Today's Visits  Date Type Provider Dept   09/28/21 Telemedicine Martin Johns PA-C [facility] today's visits and meeting all other requirements  Future Appointments  No visits were found meeting these conditions  Showing future appointments within next 150 days and meeting all other requirements       The patient was identified by name and date of birth  Genesis Tahira was informed that this is a telemedicine visit and that the visit is being conducted through [address] [place] [address] Now and patient was informed that this is a secure, HIPAA-compliant platform  She agrees to proceed     My office door was closed  No one else was in the room  She acknowledged consent and understanding of privacy and security of the video platform   The patient has agreed to participate and understands they can discontinue the visit at any time  Patient is aware this is a billable service  Subjective  Ovi Harrison is a 45 y o  female complaining of ragweed season causing increased allergies, itching ears, nasal congestion, then the past few days symptoms got worse, ear is now cracked with discharge from her itching, post nasal drip, coughing, sinus pain and pressure  Typical sinus infection from allergies for patient  She does not feel sick, denies fever, chills, aches, fatigue, N/v/d, change in sense of smell or tast        Past Medical History:   Diagnosis Date    Abnormal weight gain     Anxiety     Arthralgia     Stomatitis     last assessed 7/22/13; resolved 5/4/16       Past Surgical History:   Procedure Laterality Date    CHOLECYSTECTOMY LAPAROSCOPIC         Current Outpatient Medications   Medication Sig Dispense Refill    APPLE CIDER VINEGAR PO Take by mouth      Ascorbic Acid (VITAMIN C PO) Take by mouth      esomeprazole (NEXIUM) 20 mg capsule Take 1 capsule by mouth daily      Multiple Vitamins-Minerals (HAIR SKIN NAILS PO) Take by mouth      azithromycin (Zithromax) 250 mg tablet Take 2 tablets (500 mg total) by mouth daily for 1 day, THEN 1 tablet (250 mg total) daily for 4 days  6 tablet 0    escitalopram (LEXAPRO) 10 mg tablet Take 1 tablet (10 mg total) by mouth daily 90 tablet 3    mometasone (ELOCON) 0 1 % cream Apply topically daily 45 g 0     No current facility-administered medications for this visit  Allergies   Allergen Reactions    Codeine Tachycardia    Penicillins Hives    Prednisone Irritability and Hyperactivity    Sulfa Antibiotics Hives    Tetracycline        Review of Systems   Constitutional: Negative  HENT: Positive for congestion, postnasal drip, rhinorrhea, sinus pressure and sinus pain  Respiratory: Negative  Cardiovascular: Negative  Neurological: Negative  Hematological: Negative  Psychiatric/Behavioral: Negative          Video Exam    Vitals: 09/28/21 0747   Weight: 96 6 kg (213 lb)   Height: 5' 3 75" (1 619 m)       Physical Exam  Constitutional:       Appearance: Normal appearance  HENT:      Head: Normocephalic and atraumatic  Pulmonary:      Effort: Pulmonary effort is normal  No respiratory distress  Neurological:      General: No focal deficit present  Mental Status: She is alert and oriented to person, place, and time  Psychiatric:         Mood and Affect: Mood normal          Behavior: Behavior normal          Thought Content: Thought content normal          Judgment: Judgment normal           I spent 15 minutes directly with the patient during this visit    VIRTUAL VISIT DISCLAIMER      Ovi Harrison verbally agrees to participate in [employer]  Pt is aware that [employer] could be limited without vital signs or the ability to perform a full hands-on physical Viktoria Melendrez understands she or the provider may request at any time to terminate the video visit and request the patient to seek care or treatment in person

## 2021-12-23 ENCOUNTER — TELEMEDICINE (OUTPATIENT)
Dept: FAMILY MEDICINE CLINIC | Facility: CLINIC | Age: 51
End: 2021-12-23
Payer: COMMERCIAL

## 2021-12-23 VITALS — TEMPERATURE: 97.6 F

## 2021-12-23 DIAGNOSIS — J01.00 ACUTE NON-RECURRENT MAXILLARY SINUSITIS: Primary | ICD-10-CM

## 2021-12-23 DIAGNOSIS — N76.0 ACUTE VAGINITIS: ICD-10-CM

## 2021-12-23 PROCEDURE — 99214 OFFICE O/P EST MOD 30 MIN: CPT | Performed by: NURSE PRACTITIONER

## 2021-12-23 PROCEDURE — 1036F TOBACCO NON-USER: CPT | Performed by: NURSE PRACTITIONER

## 2021-12-23 RX ORDER — FLUCONAZOLE 150 MG/1
150 TABLET ORAL ONCE
Qty: 1 TABLET | Refills: 0 | Status: SHIPPED | OUTPATIENT
Start: 2021-12-23 | End: 2021-12-23

## 2021-12-23 RX ORDER — AZITHROMYCIN 250 MG/1
TABLET, FILM COATED ORAL
Qty: 6 TABLET | Refills: 0 | Status: SHIPPED | OUTPATIENT
Start: 2021-12-23 | End: 2021-12-28

## 2022-02-03 ENCOUNTER — HOSPITAL ENCOUNTER (OUTPATIENT)
Dept: MAMMOGRAPHY | Facility: CLINIC | Age: 52
Discharge: HOME | End: 2022-02-03
Payer: COMMERCIAL

## 2022-02-03 DIAGNOSIS — Z12.31 BREAST CANCER SCREENING BY MAMMOGRAM: ICD-10-CM

## 2022-02-03 PROCEDURE — 77063 BREAST TOMOSYNTHESIS BI: CPT

## 2022-02-03 PROCEDURE — 77067 SCR MAMMO BI INCL CAD: CPT

## 2022-03-05 ENCOUNTER — APPOINTMENT (OUTPATIENT)
Dept: LAB | Facility: HOSPITAL | Age: 52
End: 2022-03-05
Payer: COMMERCIAL

## 2022-03-05 DIAGNOSIS — R31.9 HEMATURIA, UNSPECIFIED TYPE: ICD-10-CM

## 2022-03-05 LAB
BACTERIA UR QL AUTO: ABNORMAL /HPF
BILIRUB UR QL STRIP: NEGATIVE
CLARITY UR: CLEAR
COLOR UR: COLORLESS
GLUCOSE UR STRIP-MCNC: NEGATIVE MG/DL
HGB UR QL STRIP.AUTO: NEGATIVE
KETONES UR STRIP-MCNC: NEGATIVE MG/DL
LEUKOCYTE ESTERASE UR QL STRIP: ABNORMAL
MUCOUS THREADS UR QL AUTO: ABNORMAL
NITRITE UR QL STRIP: NEGATIVE
NON-SQ EPI CELLS URNS QL MICRO: ABNORMAL /HPF
PH UR STRIP.AUTO: 6 [PH]
PROT UR STRIP-MCNC: NEGATIVE MG/DL
RBC #/AREA URNS AUTO: ABNORMAL /HPF
SP GR UR STRIP.AUTO: 1.01 (ref 1–1.03)
UROBILINOGEN UR STRIP-ACNC: <2 MG/DL
WBC #/AREA URNS AUTO: ABNORMAL /HPF

## 2022-03-05 PROCEDURE — 81001 URINALYSIS AUTO W/SCOPE: CPT

## 2022-03-08 ENCOUNTER — ANNUAL EXAM (OUTPATIENT)
Dept: FAMILY MEDICINE CLINIC | Facility: CLINIC | Age: 52
End: 2022-03-08
Payer: COMMERCIAL

## 2022-03-08 VITALS
SYSTOLIC BLOOD PRESSURE: 138 MMHG | HEIGHT: 64 IN | DIASTOLIC BLOOD PRESSURE: 88 MMHG | WEIGHT: 215 LBS | HEART RATE: 100 BPM | RESPIRATION RATE: 16 BRPM | BODY MASS INDEX: 36.7 KG/M2

## 2022-03-08 DIAGNOSIS — Z12.11 COLON CANCER SCREENING: ICD-10-CM

## 2022-03-08 DIAGNOSIS — Z01.419 ENCOUNTER FOR GYNECOLOGICAL EXAMINATION WITH PAPANICOLAOU SMEAR OF CERVIX: Primary | ICD-10-CM

## 2022-03-08 DIAGNOSIS — E55.9 VITAMIN D DEFICIENCY: ICD-10-CM

## 2022-03-08 DIAGNOSIS — Z00.00 ROUTINE ADULT HEALTH MAINTENANCE: ICD-10-CM

## 2022-03-08 PROBLEM — R87.610 ASCUS OF CERVIX WITH NEGATIVE HIGH RISK HPV: Status: RESOLVED | Noted: 2019-08-27 | Resolved: 2022-03-08

## 2022-03-08 PROBLEM — R09.81 SINUS CONGESTION: Status: RESOLVED | Noted: 2018-02-01 | Resolved: 2022-03-08

## 2022-03-08 PROCEDURE — G0145 SCR C/V CYTO,THINLAYER,RESCR: HCPCS | Performed by: PHYSICIAN ASSISTANT

## 2022-03-08 PROCEDURE — 3008F BODY MASS INDEX DOCD: CPT | Performed by: PHYSICIAN ASSISTANT

## 2022-03-08 PROCEDURE — G0476 HPV COMBO ASSAY CA SCREEN: HCPCS | Performed by: PHYSICIAN ASSISTANT

## 2022-03-08 PROCEDURE — 99396 PREV VISIT EST AGE 40-64: CPT | Performed by: PHYSICIAN ASSISTANT

## 2022-03-08 PROCEDURE — 1036F TOBACCO NON-USER: CPT | Performed by: PHYSICIAN ASSISTANT

## 2022-03-08 PROCEDURE — 3725F SCREEN DEPRESSION PERFORMED: CPT | Performed by: PHYSICIAN ASSISTANT

## 2022-03-08 RX ORDER — PHENOL 1.4 %
600 AEROSOL, SPRAY (ML) MUCOUS MEMBRANE 2 TIMES DAILY WITH MEALS
Start: 2022-03-08

## 2022-03-08 RX ORDER — MELATONIN
2000 DAILY
Start: 2022-03-08

## 2022-03-08 NOTE — PROGRESS NOTES
ASSESSMENT & PLAN: Jm Martines is a 45 y o   with normal gynecologic exam     1   Routine well woman exam done today  2    Pap and HPV:Pap with HPV was done today  Current ASCCP Guidelines reviewed  3 The following were reviewed in today's visit: breast self exam, menopause, osteoporosis, adequate intake of calcium and vitamin D, exercise, healthy diet and colonoscopy discussed and ordered  4  Patient to return to office in 12 months for physical      All questions have been answered to her satisfaction  CC:  Annual Gynecologic Examination    HPI: Jm Martines is a 45 y o  Darlina Pavel who presents for annual gynecologic examination  She has the following concerns:  After 6 months got menses in , then skipped March  Health Maintenance:    She exercises 0 days per week  She wears her seatbelt routinely  She does perform regular monthly self breast exams  She feels safe at home  Patients does follow a balanced diet  Past Medical History:   Diagnosis Date    Abnormal weight gain     Anxiety     Arthralgia     Stomatitis     last assessed 13; resolved 16       Past Surgical History:   Procedure Laterality Date    CHOLECYSTECTOMY LAPAROSCOPIC         Past OB/Gyn History:   No LMP recorded  History of sexually transmitted infection No  Patient is currently sexually active: heterosexual and  monogamous  years   Birth control:no method    Last Pap  2019 :  no abnormalities    Family History  Family History   Problem Relation Age of Onset    Diabetes Mother     Breast cancer Mother     Heart failure Father     Coronary artery disease Father    [place] Diabetes Father     Other Father         pure hypercholesterolemia    Pancreatic cancer Paternal Grandmother     Cancer Family     No Known Problems Daughter     No Known Problems Daughter     No Known Problems Maternal Aunt     No Known Problems Maternal Aunt     Breast cancer Paternal Aunt     Mental illness Neg Hx  Substance Abuse Neg Hx        Social History:  Social History     Socioeconomic History    Marital status: /Civil Union     Spouse name: Not on file    Number of children: Not on file    Years of education: Not on file    Highest education level: Not on file   Occupational History    Not on file   Tobacco Use    Smoking status: Never Smoker    Smokeless tobacco: Never Used   Vaping Use    Vaping Use: Never used   Substance and Sexual Activity    Alcohol use: Yes     Alcohol/week: 1 0 standard drink     Types: 1 Glasses of wine per week     Comment: Social    Drug use: No    Sexual activity: Yes     Partners: Male   Other Topics Concern    Not on file   Social History Narrative    Caffeine use - Nominal     Uses safety equipment - smoke detectors     Uses seat belts      Social Determinants of Health     Financial Resource Strain: Not on file   Food Insecurity: Not on file   Transportation Needs: Not on file   Physical Activity: Not on file   Stress: Not on file   Social Connections: Not on file   Intimate Partner Violence: Not on file   Housing Stability: Not on file     Presently lives with   Patient is   Patient is currently employed  Allergies: Allergies   Allergen Reactions    Codeine Tachycardia    Penicillins Hives    Prednisone Irritability and Hyperactivity    Sulfa Antibiotics Hives    Tetracycline        Medications:    Current Outpatient Medications:     APPLE CIDER VINEGAR PO, Take by mouth, Disp: , Rfl:     Ascorbic Acid (VITAMIN C PO), Take by mouth, Disp: , Rfl:     esomeprazole (NEXIUM) 20 mg capsule, Take 1 capsule by mouth daily, Disp: , Rfl:     mometasone (ELOCON) 0 1 % cream, Apply topically daily, Disp: 45 g, Rfl: 0    Multiple Vitamins-Minerals (HAIR SKIN NAILS PO), Take by mouth, Disp: , Rfl:     Review of Systems:  Review of Systems   Constitutional: Negative  HENT: Negative  Eyes: Negative  Respiratory: Negative  Cardiovascular: Negative  Gastrointestinal: Negative  Endocrine: Negative  Genitourinary: Negative  Musculoskeletal: Negative  Skin: Negative  Allergic/Immunologic: Negative  Neurological: Negative  Hematological: Negative  Psychiatric/Behavioral: Negative  Physical Exam:  /88 (BP Location: Left arm, Patient Position: Sitting, Cuff Size: Large)   Pulse 100   Resp 16   Ht 5' 3 75" (1 619 m)   Wt 97 5 kg (215 lb)   Breastfeeding No   BMI 37 19 kg/m²    Physical Exam  Constitutional:       Appearance: She is well-developed  Genitourinary:      No lesions in the vagina  No inguinal adenopathy present in the right or left side  No vaginal discharge, erythema or bleeding  Right Adnexa: not tender, not full and no mass present  Left Adnexa: not tender, not full and no mass present  No cervical motion tenderness, discharge, lesion or polyp  Uterus is not enlarged or tender  No uterine mass detected  HENT:      Head: Normocephalic and atraumatic  Eyes:      Extraocular Movements: Extraocular movements intact  Conjunctiva/sclera: Conjunctivae normal       Pupils: Pupils are equal, round, and reactive to light  Neck:      Thyroid: No thyromegaly  Cardiovascular:      Rate and Rhythm: Normal rate and regular rhythm  Pulses: Normal pulses  Heart sounds: Normal heart sounds  No murmur heard  Pulmonary:      Effort: Pulmonary effort is normal  No respiratory distress  Breath sounds: Normal breath sounds  No wheezing or rales  Abdominal:      General: Abdomen is flat  Bowel sounds are normal  There is no distension  Palpations: Abdomen is soft  There is no mass  Hernia: No hernia is present  There is no hernia in the left inguinal area  Musculoskeletal:         General: Normal range of motion  Cervical back: Normal range of motion and neck supple     Lymphadenopathy:      Cervical: No cervical adenopathy  Lower Body: No right inguinal adenopathy  No left inguinal adenopathy  Neurological:      General: No focal deficit present  Mental Status: She is alert and oriented to person, place, and time  Cranial Nerves: No cranial nerve deficit  Deep Tendon Reflexes: Reflexes are normal and symmetric  Skin:     General: Skin is warm and dry  Psychiatric:         Mood and Affect: Mood normal          Behavior: Behavior normal          Thought Content:  Thought content normal          Judgment: Judgment normal

## 2022-03-14 LAB
LAB AP GYN PRIMARY INTERPRETATION: NORMAL
Lab: NORMAL

## 2022-05-03 ENCOUNTER — TELEPHONE (OUTPATIENT)
Dept: FAMILY MEDICINE CLINIC | Facility: CLINIC | Age: 52
End: 2022-05-03

## 2022-05-03 ENCOUNTER — HOSPITAL ENCOUNTER (OUTPATIENT)
Dept: VASCULAR ULTRASOUND | Facility: HOSPITAL | Age: 52
Discharge: HOME | End: 2022-05-03
Payer: COMMERCIAL

## 2022-05-03 ENCOUNTER — OFFICE VISIT (OUTPATIENT)
Dept: FAMILY MEDICINE CLINIC | Facility: CLINIC | Age: 52
End: 2022-05-03
Payer: COMMERCIAL

## 2022-05-03 VITALS
HEART RATE: 110 BPM | WEIGHT: 216 LBS | HEIGHT: 64 IN | SYSTOLIC BLOOD PRESSURE: 126 MMHG | DIASTOLIC BLOOD PRESSURE: 80 MMHG | RESPIRATION RATE: 16 BRPM | BODY MASS INDEX: 36.88 KG/M2

## 2022-05-03 DIAGNOSIS — M79.662 PAIN OF LEFT CALF: ICD-10-CM

## 2022-05-03 DIAGNOSIS — M79.605 LEFT LEG PAIN: ICD-10-CM

## 2022-05-03 DIAGNOSIS — M79.662 PAIN OF LEFT CALF: Primary | ICD-10-CM

## 2022-05-03 PROCEDURE — 99214 OFFICE O/P EST MOD 30 MIN: CPT | Performed by: PHYSICIAN ASSISTANT

## 2022-05-03 PROCEDURE — 93971 EXTREMITY STUDY: CPT

## 2022-05-03 PROCEDURE — 93971 EXTREMITY STUDY: CPT | Performed by: SURGERY

## 2022-05-03 RX ORDER — GABAPENTIN 100 MG/1
100 CAPSULE ORAL
Qty: 30 CAPSULE | Refills: 0 | Status: SHIPPED | OUTPATIENT
Start: 2022-05-03

## 2022-05-03 NOTE — TELEPHONE ENCOUNTER
The test was normal     I believe this is probably an aggravation of the nerve pain from the previous trauma  I want her to try the gabapentin tonight to see if it helps with sleep and to relax the nerve  I want her to work on gentle stretching/her PT stretches to see if muscles relax a little  She can try a hot bath in epson salts to help relax the area also

## 2022-05-03 NOTE — TELEPHONE ENCOUNTER
Patient wanted to know if the test showed anything at all? She wants to know why she's having so much pain  Also, where does she go from here?

## 2022-05-03 NOTE — PROGRESS NOTES
Assessment/Plan:    1  Pain of left calf    - will r/o blood clot due to calf pain x 72 hours  - VAS lower limb venous duplex study, unilateral/limited; Future    2  Left leg pain    - has been in PT but less active on non PT days and more sedentary with job has proven to put patient back with healing of left leg pain from trauma causing saphenous nerve damage  Encouraged patient she needs to walk 3-4 times a week in addition to PT exercises  Can try gabapentin at night to help relax leg/nerve  Was recommended by Dr Edwin Robert initially but not taken  Would like to try now as it is affecting her sleep   - gabapentin (Neurontin) 100 mg capsule; Take 1 capsule (100 mg total) by mouth daily at bedtime  Dispense: 30 capsule; Refill: 0    F/u as needed      Subjective:   Chief Complaint   Patient presents with    Leg Pain     Left      Patient ID: Sarah Lazar is a 45 y o  female  Patient had PT on Saturday, later in the day began with sharp calf pain, all muscles feel tight and spasm  Concerned as for her job she sits 7-8 hours a day now  Denies swelling  Also still suffering from trauma to her saphenous nerve, left leg two years ago  Was going 3 x a week for PT, then dropped back to one time and less active  Pain worse, quadricep  Muscle always tight  Saw Dr Edwni Robert who offered gabapentin but never took it  Now not sleeping, anxiety worse during day        The following portions of the patient's history were reviewed and updated as appropriate: allergies, current medications, past family history, past medical history, past social history, past surgical history and problem list     Past Medical History:   Diagnosis Date    Abnormal weight gain     Anxiety     Arthralgia     Stomatitis     last assessed 7/22/13; resolved 5/4/16     Past Surgical History:   Procedure Laterality Date    CHOLECYSTECTOMY LAPAROSCOPIC       Family History   Problem Relation Age of Onset    Diabetes Mother     Breast cancer Mother    Chai Rockwell Heart failure Father     Coronary artery disease Father     Diabetes Father     Other Father         pure hypercholesterolemia    Pancreatic cancer Paternal Grandmother     Cancer Family     No Known Problems Daughter     No Known Problems Daughter     No Known Problems Maternal Aunt     No Known Problems Maternal Aunt     Breast cancer Paternal Aunt     Mental illness Neg Hx     Substance Abuse Neg Hx      Social History     Socioeconomic History    Marital status: /Civil Union     Spouse name: Not on file    Number of children: Not on file    Years of education: Not on file    Highest education level: Not on file   Occupational History    Not on file   Tobacco Use    Smoking status: Never Smoker    Smokeless tobacco: Never Used   Vaping Use    Vaping Use: Never used   Substance and Sexual Activity    Alcohol use:  Yes     Alcohol/week: 1 0 standard drink     Types: 1 Glasses of wine per week     Comment: Social    Drug use: No    Sexual activity: Yes     Partners: Male   Other Topics Concern    Not on file   Social History Narrative    Caffeine use - Nominal     Uses safety equipment - smoke detectors     Uses seat belts      Social Determinants of Health     Financial Resource Strain: Not on file   Food Insecurity: Not on file   Transportation Needs: Not on file   Physical Activity: Not on file   Stress: Not on file   Social Connections: Not on file   Intimate Partner Violence: Not on file   Housing Stability: Not on file       Current Outpatient Medications:     APPLE CIDER VINEGAR PO, Take by mouth, Disp: , Rfl:     Ascorbic Acid (VITAMIN C PO), Take by mouth, Disp: , Rfl:     calcium carbonate (OS-ERICKA) 600 MG tablet, Take 1 tablet (600 mg total) by mouth 2 (two) times a day with meals, Disp: , Rfl:     cholecalciferol (VITAMIN D3) 1,000 units tablet, Take 2 tablets (2,000 Units total) by mouth daily, Disp: , Rfl:     esomeprazole (NEXIUM) 20 mg capsule, Take 1 capsule by mouth daily, Disp: , Rfl:     mometasone (ELOCON) 0 1 % cream, Apply topically daily, Disp: 45 g, Rfl: 0    Multiple Vitamins-Minerals (HAIR SKIN NAILS PO), Take by mouth, Disp: , Rfl:     Review of Systems          Objective:    Vitals:    05/03/22 1153   BP: 126/80   Pulse: (!) 110   Resp: 16   Weight: 98 kg (216 lb)   Height: 5' 3 75" (1 619 m)        Physical Exam  Constitutional:       Appearance: Normal appearance  HENT:      Head: Normocephalic and atraumatic  Cardiovascular:      Rate and Rhythm: Normal rate and regular rhythm  Pulses: Normal pulses  Heart sounds: Normal heart sounds  Pulmonary:      Effort: Pulmonary effort is normal       Breath sounds: Normal breath sounds  Musculoskeletal:      Cervical back: Normal range of motion and neck supple  Right lower leg: No edema  Left lower leg: No edema  Comments: Left calf tender, spasm  Quadriceps muscle tight with spasm distally   Neurological:      General: No focal deficit present  Mental Status: She is alert and oriented to person, place, and time  Psychiatric:         Mood and Affect: Mood normal          Behavior: Behavior normal          Thought Content: Thought content normal          Judgment: Judgment normal                BMI Counseling: Body mass index is 37 37 kg/m²  The BMI is above normal  Nutrition recommendations include reducing portion sizes

## 2022-05-10 ENCOUNTER — VBI (OUTPATIENT)
Dept: ADMINISTRATIVE | Facility: OTHER | Age: 52
End: 2022-05-10

## 2022-06-20 ENCOUNTER — TELEPHONE (OUTPATIENT)
Dept: FAMILY MEDICINE CLINIC | Facility: CLINIC | Age: 52
End: 2022-06-20

## 2022-06-20 NOTE — TELEPHONE ENCOUNTER
----- Message from Bonita Morin PA-C sent at 6/20/2022  1:53 PM EDT -----  Regarding: FW: Sinus    Are we still doing video visits for this stuff? I didn't think we were  [facility] has a platform that does this don't they?    ----- Message -----  From: Pat Ramos MA  Sent: 6/20/2022  10:40 AM EDT  To: Bonita Morin PA-C  Subject: FW: Sinus                                          ----- Message -----  From: Cameron Romero  Sent: 6/20/2022  10:31 AM EDT  To: , #  Subject: Sinus                                            Good morning Lavonne Alaniz all is well! I'm having sinus issues my yearly problem when allergies get bad started last Thursday been taking Advil and decongestant for pressure in eyes, bridge of nose crackling in ears clogged nose at times   Wanted to see if I can have you do a video appt since I can't take off work       Thanks  Take care   [facility]

## 2022-06-21 NOTE — TELEPHONE ENCOUNTER
That what I thought, please let her know  I believe [facility] has a platform for video visits for sick people outside of their PCP offices though

## 2022-07-15 ENCOUNTER — AMB VIDEO VISIT (OUTPATIENT)
Dept: OTHER | Facility: HOSPITAL | Age: 52
End: 2022-07-15
Payer: COMMERCIAL

## 2022-07-15 DIAGNOSIS — J01.00 ACUTE NON-RECURRENT MAXILLARY SINUSITIS: Primary | ICD-10-CM

## 2022-07-15 PROCEDURE — 99212 OFFICE O/P EST SF 10 MIN: CPT | Performed by: PHYSICIAN ASSISTANT

## 2022-07-15 RX ORDER — AZITHROMYCIN 250 MG/1
TABLET, FILM COATED ORAL
Qty: 6 TABLET | Refills: 0 | Status: SHIPPED | OUTPATIENT
Start: 2022-07-15 | End: 2022-07-20

## 2022-07-15 NOTE — CARE ANYWHERE EVISITS
Visit Summary for SINCERE JONAS - Gender: Female - Date of Birth: [id number]  Date: [id number] - Duration: 5 minutes  Patient: Abby Whyte  Provider: Ruby Thakur PA-C    Patient Contact Information  Address  [address] [place] [address]; [place] [address]  [phone]    Visit Topics  Sinus pressure bridge of nose ears feel clogged and thumping [Added By: Self - 2022-07-15]    Triage Questions   What is your current physical address in the event of a medical emergency? Answer []  Are you allergic to any medications? Answer []  Are you now or could you be pregnant? Answer []  Do you have any immune system compromise or chronic lung   disease? Answer []  Do you have any vulnerable family members in the home (infant, pregnant, cancer, elderly)? Answer []     Conversation Transcripts  [0A][0A] [Notification] You are connected with Ruby Thakur PA-C, Urgent Care Specialist [0A][Notification] SINCERE Velazquez is located in [place]  [0A][Notification] SINCERE Velazquez has shared health history  Melony Morgan  [0A]    Diagnosis  Acute maxillary sinusitis, unspecified    Procedures  Value: [id number] Code: CPT-4 UNLISTED E&M SERVICE    Medications Prescribed    No prescriptions ordered    Electronically signed by: Tonya Whaley(NPI [id number])

## 2022-07-15 NOTE — PROGRESS NOTES
Video Visit - Francia Mendoza 47 y o  female MRN: [id number]    REQUIRED DOCUMENTATION:         1  This service was provided via [employer]  2  Provider located at [address] [place] [id number]  3  [facility] provider: Rut Nagel PA-C   4  Identify all parties in room with [facility] during [facility]:  No one else  5  After connecting through [employer], patient was identified by name and date of birth  Patient was then informed that this was a Telemedicine visit and that the exam was being conducted confidentially over secure lines  My office door was closed  No one else was in the room  Patient acknowledged consent and understanding of privacy and security of the Telemedicine visit  I informed the patient that I have reviewed their record in Epic and presented the opportunity for them to ask any questions regarding the visit today  The patient agreed to participate  HPI  Her  has covid  He started on Saturday with symptoms  She tested negative  She gets a sinus infection every summer due to her allergies  She has tried allergy meds without relief  Her sinus symptoms started 3 days ago  She tested this morning negative for covid  She has been isloated from her   She denies any fevers, chills, nausea, vomiting, SOB, CP  She states her teeth hurt from the pressure   Physical Exam  Constitutional:       General: She is not in acute distress  Appearance: Normal appearance  She is not ill-appearing, toxic-appearing or diaphoretic  HENT:      Head: Normocephalic and atraumatic  Nose: Congestion present  Comments: TTP over sinuses  Pulmonary:      Effort: Pulmonary effort is normal  No respiratory distress  Skin:     General: Skin is dry  Neurological:      General: No focal deficit present  Mental Status: She is alert and oriented to person, place, and time     Psychiatric:         Mood and Affect: Mood normal          Behavior: Behavior normal          Diagnoses and all orders for this visit:    Acute non-recurrent maxillary sinusitis  -     azithromycin (ZITHROMAX) 250 mg tablet; Take 2 tablets today then 1 tablet daily x 4 days    will treat for sinus infection  Continue to monitor with home covid tests  Follow up with PCP  ER if worsen   Patient Instructions     Sinusitis   WHAT YOU NEED TO KNOW:   Sinusitis is inflammation or infection of your sinuses  Sinusitis is most often caused by a virus  Acute sinusitis may last up to 12 weeks  Chronic sinusitis lasts longer than 12 weeks  Recurrent sinusitis means you have 4 or more infections in 1 year  DISCHARGE INSTRUCTIONS:   Return to the emergency department if:   · You have trouble breathing or wheezing that is getting worse  · You have a stiff neck, a fever, or a bad headache  · You cannot open your eye  · Your eyeball bulges out or you cannot move your eye  · You are more sleepy than normal, or you notice changes in your ability to think, move, or talk  · You have swelling of your forehead or scalp  Call your doctor if:   · You have vision changes, such as double vision  · Your eye and eyelid are red, swollen, and painful  · Your symptoms do not improve or go away after 10 days  · You have nausea and are vomiting  · Your nose is bleeding  · You have questions or concerns about your condition or care  Medicines: Your symptoms may go away on their own  Your healthcare provider may recommend watchful waiting for up to 10 days before starting antibiotics  You may need any of the following:  · Acetaminophen  decreases pain and fever  It is available without a doctor's order  Ask how much to take and how often to take it  Follow directions  Read the labels of all other medicines you are using to see if they also contain acetaminophen, or ask your doctor or pharmacist  Acetaminophen can cause liver damage if not taken correctly   Do not use more than 4 grams (4,000 milligrams) total of acetaminophen in one day  · NSAIDs , such as ibuprofen, help decrease swelling, pain, and fever  This medicine is available with or without a doctor's order  NSAIDs can cause stomach bleeding or kidney problems in certain people  If you take blood thinner medicine, always ask your healthcare provider if NSAIDs are safe for you  Always read the medicine label and follow directions  · Nasal steroid sprays  may help decrease inflammation in your nose and sinuses  · Decongestants  help reduce swelling and drain mucus in the nose and sinuses  They may help you breathe easier  · Antihistamines  help dry mucus in the nose and relieve sneezing  · Antibiotics  help treat or prevent a bacterial infection  · Take your medicine as directed  Contact your healthcare provider if you think your medicine is not helping or if you have side effects  Tell him or her if you are allergic to any medicine  Keep a list of the medicines, vitamins, and herbs you take  Include the amounts, and when and why you take them  Bring the list or the pill bottles to follow-up visits  Carry your medicine list with you in case of an emergency  Self-care:   · Rinse your sinuses as directed  Use a sinus rinse device to rinse your nasal passages with a saline (salt water) solution or distilled water  Do not use tap water  This will help thin the mucus in your nose and rinse away pollen and dirt  It will also help reduce swelling so you can breathe normally  · Use a humidifier  to increase air moisture in your home  This may make it easier for you to breathe and help decrease your cough  · Sleep with your head elevated  Place an extra pillow under your head before you go to sleep to help your sinuses drain  · Drink liquids as directed  Ask your healthcare provider how much liquid to drink each day and which liquids are best for you   Liquids will thin the mucus in your nose and help it drain  Avoid drinks that contain alcohol or caffeine  · Do not smoke, and avoid secondhand smoke  Nicotine and other chemicals in cigarettes and cigars can make your symptoms worse  Ask your healthcare provider for information if you currently smoke and need help to quit  E-cigarettes or smokeless tobacco still contain nicotine  Talk to your healthcare provider before you use these products  Prevent the spread of germs:   · Wash your hands often with soap and water  Wash your hands after you use the bathroom, change a child's diaper, or sneeze  Wash your hands before you prepare or eat food  · Stay away from people who are sick  Some germs spread easily and quickly through contact  Follow up with your doctor as directed: You may be referred to an ear, nose, and throat specialist  Write down your questions so you remember to ask them during your visits  © Copyright [employer] 2022 Information is for End User's use only and may not be sold, redistributed or otherwise used for commercial purposes  All illustrations and images included in CareNotes® are the copyrighted property of A D A M , Inc  or [facility] Micheal Orta   The above information is an  only  It is not intended as medical advice for individual conditions or treatments  Talk to your doctor, nurse or pharmacist before following any medical regimen to see if it is safe and effective for you

## 2022-07-15 NOTE — PATIENT INSTRUCTIONS
Sinusitis   WHAT YOU NEED TO KNOW:   Sinusitis is inflammation or infection of your sinuses  Sinusitis is most often caused by a virus  Acute sinusitis may last up to 12 weeks  Chronic sinusitis lasts longer than 12 weeks  Recurrent sinusitis means you have 4 or more infections in 1 year  DISCHARGE INSTRUCTIONS:   Return to the emergency department if:   You have trouble breathing or wheezing that is getting worse  You have a stiff neck, a fever, or a bad headache  You cannot open your eye  Your eyeball bulges out or you cannot move your eye  You are more sleepy than normal, or you notice changes in your ability to think, move, or talk  You have swelling of your forehead or scalp  Call your doctor if:   You have vision changes, such as double vision  Your eye and eyelid are red, swollen, and painful  Your symptoms do not improve or go away after 10 days  You have nausea and are vomiting  Your nose is bleeding  You have questions or concerns about your condition or care  Medicines: Your symptoms may go away on their own  Your healthcare provider may recommend watchful waiting for up to 10 days before starting antibiotics  You may need any of the following:  Acetaminophen  decreases pain and fever  It is available without a doctor's order  Ask how much to take and how often to take it  Follow directions  Read the labels of all other medicines you are using to see if they also contain acetaminophen, or ask your doctor or pharmacist  Acetaminophen can cause liver damage if not taken correctly  Do not use more than 4 grams (4,000 milligrams) total of acetaminophen in one day  NSAIDs , such as ibuprofen, help decrease swelling, pain, and fever  This medicine is available with or without a doctor's order  NSAIDs can cause stomach bleeding or kidney problems in certain people   If you take blood thinner medicine, always ask your healthcare provider if NSAIDs are safe for you  Always read the medicine label and follow directions  Nasal steroid sprays  may help decrease inflammation in your nose and sinuses  Decongestants  help reduce swelling and drain mucus in the nose and sinuses  They may help you breathe easier  Antihistamines  help dry mucus in the nose and relieve sneezing  Antibiotics  help treat or prevent a bacterial infection  Take your medicine as directed  Contact your healthcare provider if you think your medicine is not helping or if you have side effects  Tell him or her if you are allergic to any medicine  Keep a list of the medicines, vitamins, and herbs you take  Include the amounts, and when and why you take them  Bring the list or the pill bottles to follow-up visits  Carry your medicine list with you in case of an emergency  Self-care:   Rinse your sinuses as directed  Use a sinus rinse device to rinse your nasal passages with a saline (salt water) solution or distilled water  Do not use tap water  This will help thin the mucus in your nose and rinse away pollen and dirt  It will also help reduce swelling so you can breathe normally  Use a humidifier  to increase air moisture in your home  This may make it easier for you to breathe and help decrease your cough  Sleep with your head elevated  Place an extra pillow under your head before you go to sleep to help your sinuses drain  Drink liquids as directed  Ask your healthcare provider how much liquid to drink each day and which liquids are best for you  Liquids will thin the mucus in your nose and help it drain  Avoid drinks that contain alcohol or caffeine  Do not smoke, and avoid secondhand smoke  Nicotine and other chemicals in cigarettes and cigars can make your symptoms worse  Ask your healthcare provider for information if you currently smoke and need help to quit  E-cigarettes or smokeless tobacco still contain nicotine   Talk to your healthcare provider before you use these products  Prevent the spread of germs:   Wash your hands often with soap and water  Wash your hands after you use the bathroom, change a child's diaper, or sneeze  Wash your hands before you prepare or eat food  Stay away from people who are sick  Some germs spread easily and quickly through contact  Follow up with your doctor as directed: You may be referred to an ear, nose, and throat specialist  Write down your questions so you remember to ask them during your visits  © Copyright [employer] 2022 Information is for End User's use only and may not be sold, redistributed or otherwise used for commercial purposes  All illustrations and images included in CareNotes® are the copyrighted property of A D A M , Inc  or [facility] Micheal Orta   The above information is an  only  It is not intended as medical advice for individual conditions or treatments  Talk to your doctor, nurse or pharmacist before following any medical regimen to see if it is safe and effective for you

## 2022-08-05 ENCOUNTER — OFFICE VISIT (OUTPATIENT)
Dept: FAMILY MEDICINE CLINIC | Facility: CLINIC | Age: 52
End: 2022-08-05
Payer: COMMERCIAL

## 2022-08-05 VITALS
BODY MASS INDEX: 36.99 KG/M2 | HEART RATE: 102 BPM | HEIGHT: 64 IN | SYSTOLIC BLOOD PRESSURE: 122 MMHG | WEIGHT: 216.7 LBS | DIASTOLIC BLOOD PRESSURE: 80 MMHG | RESPIRATION RATE: 16 BRPM

## 2022-08-05 DIAGNOSIS — J30.9 ALLERGIC RHINITIS, UNSPECIFIED SEASONALITY, UNSPECIFIED TRIGGER: Primary | ICD-10-CM

## 2022-08-05 PROCEDURE — 99213 OFFICE O/P EST LOW 20 MIN: CPT | Performed by: PHYSICIAN ASSISTANT

## 2022-08-05 RX ORDER — AZELASTINE 1 MG/ML
2 SPRAY, METERED NASAL 2 TIMES DAILY
Qty: 30 ML | Refills: 1 | Status: SHIPPED | OUTPATIENT
Start: 2022-08-05 | End: 2022-09-14

## 2022-08-05 NOTE — PROGRESS NOTES
Assessment/Plan:    1  Allergic rhinitis, unspecified seasonality, unspecified trigger    - try astelin 1 sprays each nostril twice daily, try Nasonex 2 sprays each nostril daily  - azelastine (ASTELIN) 0 1 % nasal spray; 2 sprays into each nostril 2 (two) times a day Use in each nostril as directed  Dispense: 30 mL; Refill: 1    2  Mouth burn     - gargle with warm salt water 3-4 times a day    F/u as needed      Subjective:   Chief Complaint   Patient presents with    Burnt top of mouth      Patient ID: Chad Deng is a 47 y o  female  Monday night burnt top of mouth on food, then next day started to feel like left side of face is swollen  Nasal congestion, pressure behind eyes, left ear  Denies fever, chills  Denies thick discharge  Started gargling once yesterday        The following portions of the patient's history were reviewed and updated as appropriate: allergies, current medications, past family history, past medical history, past social history, past surgical history and problem list     Past Medical History:   Diagnosis Date    Abnormal weight gain     Anxiety     Arthralgia     Stomatitis     last assessed 7/22/13; resolved 5/4/16     Past Surgical History:   Procedure Laterality Date    CHOLECYSTECTOMY LAPAROSCOPIC       Family History   Problem Relation Age of Onset    Diabetes Mother     Breast cancer Mother     Heart failure Father     Coronary artery disease Father     Diabetes Father     Other Father         pure hypercholesterolemia    Pancreatic cancer Paternal Grandmother     Cancer Family     No Known Problems Daughter     No Known Problems Daughter     No Known Problems Maternal Aunt     No Known Problems Maternal Aunt     Breast cancer Paternal Aunt     Mental illness Neg Hx     Substance Abuse Neg Hx      Social History     Socioeconomic History    Marital status: /Civil Union     Spouse name: Not on file    Number of children: Not on file    Years of education: Not on file    Highest education level: Not on file   Occupational History    Not on file   Tobacco Use    Smoking status: Never Smoker    Smokeless tobacco: Never Used   Vaping Use    Vaping Use: Never used   Substance and Sexual Activity    Alcohol use: Yes     Alcohol/week: 1 0 standard drink     Types: 1 Glasses of wine per week     Comment: Social    Drug use: No    Sexual activity: Yes     Partners: Male   Other Topics Concern    Not on file   Social History Narrative    Caffeine use - Nominal     Uses safety equipment - smoke detectors     Uses seat belts      Social Determinants of Health     Financial Resource Strain: Not on file   Food Insecurity: Not on file   Transportation Needs: Not on file   Physical Activity: Not on file   Stress: Not on file   Social Connections: Not on file   Intimate Partner Violence: Not on file   Housing Stability: Not on file       Current Outpatient Medications:     APPLE CIDER VINEGAR PO, Take by mouth, Disp: , Rfl:     Ascorbic Acid (VITAMIN C PO), Take by mouth, Disp: , Rfl:     calcium carbonate (OS-ERICKA) 600 MG tablet, Take 1 tablet (600 mg total) by mouth 2 (two) times a day with meals, Disp: , Rfl:     cholecalciferol (VITAMIN D3) 1,000 units tablet, Take 2 tablets (2,000 Units total) by mouth daily, Disp: , Rfl:     esomeprazole (NexIUM) 20 mg capsule, Take 1 capsule by mouth daily, Disp: , Rfl:     gabapentin (Neurontin) 100 mg capsule, Take 1 capsule (100 mg total) by mouth daily at bedtime, Disp: 30 capsule, Rfl: 0    mometasone (ELOCON) 0 1 % cream, Apply topically daily, Disp: 45 g, Rfl: 0    Multiple Vitamins-Minerals (HAIR SKIN NAILS PO), Take by mouth, Disp: , Rfl:     Review of Systems          Objective:    Vitals:    08/05/22 1053   BP: 122/80   Pulse: 102   Resp: 16   Weight: 98 3 kg (216 lb 11 2 oz)   Height: 5' 3 75" (1 619 m)        Physical Exam  Constitutional:       Appearance: Normal appearance  She is normal weight  HENT:      Head: Normocephalic and atraumatic  Right Ear: Tympanic membrane, ear canal and external ear normal       Left Ear: Tympanic membrane, ear canal and external ear normal       Nose: Nose normal       Mouth/Throat:      Mouth: Mucous membranes are moist       Comments: Left hard palate with some slight peeling to the skin right at teeth, no erythema, swelling  Musculoskeletal:      Cervical back: Normal range of motion and neck supple  Lymphadenopathy:      Cervical: No cervical adenopathy  Neurological:      General: No focal deficit present  Mental Status: She is alert and oriented to person, place, and time  Psychiatric:         Mood and Affect: Mood normal          Behavior: Behavior normal          Thought Content:  Thought content normal          Judgment: Judgment normal

## 2022-08-20 ENCOUNTER — APPOINTMENT (OUTPATIENT)
Dept: LAB | Facility: CLINIC | Age: 52
End: 2022-08-20
Payer: COMMERCIAL

## 2022-08-20 DIAGNOSIS — E55.9 VITAMIN D DEFICIENCY: ICD-10-CM

## 2022-08-20 DIAGNOSIS — Z00.00 ROUTINE ADULT HEALTH MAINTENANCE: ICD-10-CM

## 2022-08-20 DIAGNOSIS — Z00.8 HEALTH EXAMINATION IN POPULATION SURVEY: ICD-10-CM

## 2022-08-20 LAB
25(OH)D3 SERPL-MCNC: 28.1 NG/ML (ref 30–100)
ALBUMIN SERPL BCP-MCNC: 3.6 G/DL (ref 3.5–5)
ALP SERPL-CCNC: 60 U/L (ref 46–116)
ALT SERPL W P-5'-P-CCNC: 33 U/L (ref 12–78)
ANION GAP SERPL CALCULATED.3IONS-SCNC: 6 MMOL/L (ref 4–13)
AST SERPL W P-5'-P-CCNC: 28 U/L (ref 5–45)
BASOPHILS # BLD AUTO: 0.06 THOUSANDS/ΜL (ref 0–0.1)
BASOPHILS NFR BLD AUTO: 1 % (ref 0–1)
BILIRUB SERPL-MCNC: 0.49 MG/DL (ref 0.2–1)
BUN SERPL-MCNC: 13 MG/DL (ref 5–25)
CALCIUM SERPL-MCNC: 8.7 MG/DL (ref 8.3–10.1)
CHLORIDE SERPL-SCNC: 106 MMOL/L (ref 96–108)
CHOLEST SERPL-MCNC: 182 MG/DL
CO2 SERPL-SCNC: 26 MMOL/L (ref 21–32)
CREAT SERPL-MCNC: 0.77 MG/DL (ref 0.6–1.3)
EOSINOPHIL # BLD AUTO: 0.32 THOUSAND/ΜL (ref 0–0.61)
EOSINOPHIL NFR BLD AUTO: 3 % (ref 0–6)
ERYTHROCYTE [DISTWIDTH] IN BLOOD BY AUTOMATED COUNT: 13.4 % (ref 11.6–15.1)
EST. AVERAGE GLUCOSE BLD GHB EST-MCNC: 128 MG/DL
GFR SERPL CREATININE-BSD FRML MDRD: 88 ML/MIN/1.73SQ M
GLUCOSE P FAST SERPL-MCNC: 107 MG/DL (ref 65–99)
HBA1C MFR BLD: 6.1 %
HCT VFR BLD AUTO: 46.3 % (ref 34.8–46.1)
HDLC SERPL-MCNC: 31 MG/DL
HGB BLD-MCNC: 15.3 G/DL (ref 11.5–15.4)
IMM GRANULOCYTES # BLD AUTO: 0.04 THOUSAND/UL (ref 0–0.2)
IMM GRANULOCYTES NFR BLD AUTO: 0 % (ref 0–2)
LDLC SERPL CALC-MCNC: 128 MG/DL (ref 0–100)
LYMPHOCYTES # BLD AUTO: 5.02 THOUSANDS/ΜL (ref 0.6–4.47)
LYMPHOCYTES NFR BLD AUTO: 45 % (ref 14–44)
MCH RBC QN AUTO: 30.1 PG (ref 26.8–34.3)
MCHC RBC AUTO-ENTMCNC: 33 G/DL (ref 31.4–37.4)
MCV RBC AUTO: 91 FL (ref 82–98)
MONOCYTES # BLD AUTO: 0.93 THOUSAND/ΜL (ref 0.17–1.22)
MONOCYTES NFR BLD AUTO: 9 % (ref 4–12)
NEUTROPHILS # BLD AUTO: 4.58 THOUSANDS/ΜL (ref 1.85–7.62)
NEUTS SEG NFR BLD AUTO: 42 % (ref 43–75)
NRBC BLD AUTO-RTO: 0 /100 WBCS
PLATELET # BLD AUTO: 179 THOUSANDS/UL (ref 149–390)
PMV BLD AUTO: 10.4 FL (ref 8.9–12.7)
POTASSIUM SERPL-SCNC: 4.3 MMOL/L (ref 3.5–5.3)
PROT SERPL-MCNC: 8 G/DL (ref 6.4–8.4)
RBC # BLD AUTO: 5.08 MILLION/UL (ref 3.81–5.12)
SODIUM SERPL-SCNC: 138 MMOL/L (ref 135–147)
TRIGL SERPL-MCNC: 116 MG/DL
TSH SERPL DL<=0.05 MIU/L-ACNC: 2.7 UIU/ML (ref 0.45–4.5)
WBC # BLD AUTO: 10.95 THOUSAND/UL (ref 4.31–10.16)

## 2022-08-20 PROCEDURE — 36415 COLL VENOUS BLD VENIPUNCTURE: CPT

## 2022-08-20 PROCEDURE — 83036 HEMOGLOBIN GLYCOSYLATED A1C: CPT

## 2022-08-20 PROCEDURE — 80053 COMPREHEN METABOLIC PANEL: CPT

## 2022-08-20 PROCEDURE — 80061 LIPID PANEL: CPT

## 2022-08-20 PROCEDURE — 82306 VITAMIN D 25 HYDROXY: CPT

## 2022-08-20 PROCEDURE — 85025 COMPLETE CBC W/AUTO DIFF WBC: CPT

## 2022-08-20 PROCEDURE — 84443 ASSAY THYROID STIM HORMONE: CPT

## 2022-09-14 ENCOUNTER — CONSULT (OUTPATIENT)
Dept: HEMATOLOGY ONCOLOGY | Facility: CLINIC | Age: 52
End: 2022-09-14
Payer: COMMERCIAL

## 2022-09-14 VITALS
SYSTOLIC BLOOD PRESSURE: 160 MMHG | HEART RATE: 111 BPM | OXYGEN SATURATION: 97 % | RESPIRATION RATE: 17 BRPM | DIASTOLIC BLOOD PRESSURE: 100 MMHG | HEIGHT: 63 IN | TEMPERATURE: 97 F | WEIGHT: 216 LBS | BODY MASS INDEX: 38.27 KG/M2

## 2022-09-14 DIAGNOSIS — D72.820 LYMPHOCYTOSIS: Primary | ICD-10-CM

## 2022-09-14 DIAGNOSIS — K21.9 GASTROESOPHAGEAL REFLUX DISEASE WITHOUT ESOPHAGITIS: ICD-10-CM

## 2022-09-14 DIAGNOSIS — J30.2 SEASONAL ALLERGIC RHINITIS, UNSPECIFIED TRIGGER: ICD-10-CM

## 2022-09-14 PROCEDURE — 99244 OFF/OP CNSLTJ NEW/EST MOD 40: CPT | Performed by: INTERNAL MEDICINE

## 2022-09-14 NOTE — PROGRESS NOTES
Oncology Consult Note  Ngoc Jennings 47 y o  female MRN: [id number]  Unit/Bed#:  Encounter: [id number]      Presenting Complaint:  Leukocytosis    History of Presenting Illness:  58-year-old  female with history of postnasal drip, allergic rhinitis, esophageal reflux, anxiety, vitamin-D deficiency, arthralgia, cholecystectomy who had been noticing mild persistent elevation of white blood count      Component      Latest Ref Rng & Units 1/22/2019 3/26/2019 7/9/2021 8/20/2022   WBC      4 31 - 10 16 Thousand/uL 9 94 10 27 (H) 10 49 (H) 10 95 (H)   Red Blood Cell Count      3 81 - 5 12 Million/uL 4 80 4 89 5 03 5 08   Hemoglobin      11 5 - 15 4 g/dL 10 5 (L) 12 9 14 5 15 3   HCT      34 8 - 46 1 % 36 4 41 0 44 4 46 3 (H)   MCV      82 - 98 fL 76 (L) 84 88 91   MCH      26 8 - 34 3 pg 21 9 (L) 26 4 (L) 28 8 30 1   MCHC      31 4 - 37 4 g/dL 28 8 (L) 31 5 32 7 33 0   RDW      11 6 - 15 1 % 16 5 (H) 21 6 (H) 13 2 13 4   MPV      8 9 - 12 7 fL 10 1 10 1 10 2 10 4   Platelet Count      259 - 390 Thousands/uL 227 182 227 179   nRBC      /100 WBCs 0 0 0 0   Neutrophils %      43 - 75 % 46 47 55 42 (L)   Immat GRANS %      0 - 2 % 0 0 0 0   Lymphocytes Relative      14 - 44 % 39 42 37 45 (H)   Monocytes Relative      4 - 12 % 12 8 6 9   Eosinophils      0 - 6 % 2 2 2 3   Basophils Relative      0 - 1 % 1 1 0 1   Absolute Neutrophils      1 85 - 7 62 Thousands/µL 4 68 4 87 5 68 4 58   Immature Grans Absolute      0 00 - 0 20 Thousand/uL 0 02 0 02 0 03 0 04   Lymphocytes Absolute      0 60 - 4 47 Thousands/µL 3 83 4 28 3 90 5 02 (H)   Absolute Monocytes      0 17 - 1 22 Thousand/µL 1 16 0 85 0 66 0 93   Absolute Eosinophils      0 00 - 0 61 Thousand/µL 0 20 0 19 0 18 0 32   Basophils Absolute      0 00 - 0 10 Thousands/µL 0 05 0 06 0 04 0 06   , she reports eczema of the ears bilaterally, snoring, arthralgia, stiffness in the morning lasting less than 1 hour with swelling, sinus pressure, ear aches, denied any night sweats low-grade fever or weight loss     She does not smoke, she does not drink  Review of Systems - As stated in the HPI otherwise the fourteen point review of systems was negative  Past Medical History:   Diagnosis Date    Abnormal weight gain     Anxiety     Arthralgia     Stomatitis     last assessed 7/22/13; resolved 5/4/16       Social History     Socioeconomic History    Marital status: /Civil Union     Spouse name: None    Number of children: None    Years of education: None    Highest education level: None   Occupational History    None   Tobacco Use    Smoking status: Never Smoker    Smokeless tobacco: Never Used   Vaping Use    Vaping Use: Never used   Substance and Sexual Activity    Alcohol use:  Yes     Alcohol/week: 1 0 standard drink     Types: 1 Glasses of wine per week     Comment: Social    Drug use: No    Sexual activity: Yes     Partners: Male   Other Topics Concern    None   Social History Narrative    Caffeine use - Nominal     Uses safety equipment - smoke detectors     Uses seat belts      Social Determinants of Health     Financial Resource Strain: Not on file   Food Insecurity: Not on file   Transportation Needs: Not on file   Physical Activity: Not on file   Stress: Not on file   Social Connections: Not on file   Intimate Partner Violence: Not on file   Housing Stability: Not on file       Family History   Problem Relation Age of Onset    Diabetes Mother     Breast cancer Mother     Heart failure Father     Coronary artery disease Father     Diabetes Father     Other Father         pure hypercholesterolemia    Pancreatic cancer Paternal Grandmother     Cancer Family     No Known Problems Daughter     No Known Problems Daughter     No Known Problems Maternal Aunt     No Known Problems Maternal Aunt     Breast cancer Paternal Aunt     Mental illness Neg Hx     Substance Abuse Neg Hx        Allergies   Allergen Reactions    Codeine Tachycardia    Penicillins Hives    Prednisone Irritability and Hyperactivity    Sulfa Antibiotics Hives    Tetracycline          Current Outpatient Medications:     Ascorbic Acid (VITAMIN C PO), Take by mouth, Disp: , Rfl:     calcium carbonate (OS-ERICKA) 600 MG tablet, Take 1 tablet (600 mg total) by mouth 2 (two) times a day with meals, Disp: , Rfl:     cholecalciferol (VITAMIN D3) 1,000 units tablet, Take 2 tablets (2,000 Units total) by mouth daily, Disp: , Rfl:     esomeprazole (NexIUM) 20 mg capsule, Take 1 capsule by mouth daily, Disp: , Rfl:     mometasone (ELOCON) 0 1 % cream, Apply topically daily, Disp: 45 g, Rfl: 0    Multiple Vitamins-Minerals (HAIR SKIN NAILS PO), Take by mouth, Disp: , Rfl:       /100 (BP Location: Right arm, Patient Position: Sitting, Cuff Size: Adult)   Pulse (!) 111   Temp (!) 97 °F (36 1 °C)   Resp 17   Ht 5' 3" (1 6 m)   Wt 98 kg (216 lb)   SpO2 97%   BMI 38 26 kg/m²       General Appearance:    Alert, oriented, obese, sad and afraid        Eyes:    PERRL   Ears:    Normal external ear canals, both ears   Nose:   Nares normal, septum midline   Throat:   Mucosa moist  Pharynx without injection  Neck:   Supple, eczema of the outer ear especially on the right side       Lungs:     Clear to auscultation bilaterally   Chest Wall:    No tenderness or deformity    Heart:    Regular rate and rhythm       Abdomen:     Soft, non-tender, bowel sounds +, no organomegaly           Extremities:   Extremities no cyanosis or edema       Skin:   no rash or icterus  Lymph nodes:   Cervical, supraclavicular, and axillary nodes normal   Neurologic:   CNII-XII intact, normal strength, sensation and reflexes     Throughout               No results found for this or any previous visit (from the past 48 hour(s))  No results found    ECOG :0      Assessment and plan:    Mild persistent leukocytosis with relative lymphocytosis in a patient who has anxiety, multiple stressors, postnasal drip, possible sinus infection/pressure, arthralgia, morning stiffness     Rule out autoimmune leukocytosis secondary to autoimmune disorder will order NAVIN, CCP, CRP     Also might be related to eczema of the ears and postnasal drip would order CT scan of the face    Would order flow cytometry on the peripheral blood to rule out lymphoproliferative disorder of the bone marrow    Ultrasound of the abdomen

## 2022-09-23 ENCOUNTER — APPOINTMENT (OUTPATIENT)
Dept: LAB | Facility: CLINIC | Age: 52
End: 2022-09-23
Payer: COMMERCIAL

## 2022-09-23 DIAGNOSIS — D72.820 LYMPHOCYTOSIS: ICD-10-CM

## 2022-09-23 DIAGNOSIS — J30.2 SEASONAL ALLERGIC RHINITIS, UNSPECIFIED TRIGGER: ICD-10-CM

## 2022-09-23 DIAGNOSIS — K21.9 GASTROESOPHAGEAL REFLUX DISEASE WITHOUT ESOPHAGITIS: ICD-10-CM

## 2022-09-23 LAB
BASOPHILS # BLD AUTO: 0.05 THOUSANDS/ΜL (ref 0–0.1)
BASOPHILS NFR BLD AUTO: 1 % (ref 0–1)
CRP SERPL QL: 3.4 MG/L
EOSINOPHIL # BLD AUTO: 0.15 THOUSAND/ΜL (ref 0–0.61)
EOSINOPHIL NFR BLD AUTO: 2 % (ref 0–6)
ERYTHROCYTE [DISTWIDTH] IN BLOOD BY AUTOMATED COUNT: 13.2 % (ref 11.6–15.1)
HCT VFR BLD AUTO: 46.7 % (ref 34.8–46.1)
HGB BLD-MCNC: 15.7 G/DL (ref 11.5–15.4)
IMM GRANULOCYTES # BLD AUTO: 0.02 THOUSAND/UL (ref 0–0.2)
IMM GRANULOCYTES NFR BLD AUTO: 0 % (ref 0–2)
LYMPHOCYTES # BLD AUTO: 4.56 THOUSANDS/ΜL (ref 0.6–4.47)
LYMPHOCYTES NFR BLD AUTO: 46 % (ref 14–44)
MCH RBC QN AUTO: 30.3 PG (ref 26.8–34.3)
MCHC RBC AUTO-ENTMCNC: 33.6 G/DL (ref 31.4–37.4)
MCV RBC AUTO: 90 FL (ref 82–98)
MONOCYTES # BLD AUTO: 0.74 THOUSAND/ΜL (ref 0.17–1.22)
MONOCYTES NFR BLD AUTO: 8 % (ref 4–12)
NEUTROPHILS # BLD AUTO: 4.1 THOUSANDS/ΜL (ref 1.85–7.62)
NEUTS SEG NFR BLD AUTO: 43 % (ref 43–75)
NRBC BLD AUTO-RTO: 0 /100 WBCS
PLATELET # BLD AUTO: 204 THOUSANDS/UL (ref 149–390)
PMV BLD AUTO: 10.5 FL (ref 8.9–12.7)
RBC # BLD AUTO: 5.18 MILLION/UL (ref 3.81–5.12)
WBC # BLD AUTO: 9.62 THOUSAND/UL (ref 4.31–10.16)

## 2022-09-23 PROCEDURE — 86140 C-REACTIVE PROTEIN: CPT

## 2022-09-23 PROCEDURE — 86200 CCP ANTIBODY: CPT

## 2022-09-23 PROCEDURE — 88184 FLOWCYTOMETRY/ TC 1 MARKER: CPT

## 2022-09-23 PROCEDURE — 36415 COLL VENOUS BLD VENIPUNCTURE: CPT

## 2022-09-23 PROCEDURE — 86038 ANTINUCLEAR ANTIBODIES: CPT

## 2022-09-23 PROCEDURE — 85025 COMPLETE CBC W/AUTO DIFF WBC: CPT

## 2022-09-23 PROCEDURE — 88185 FLOWCYTOMETRY/TC ADD-ON: CPT

## 2022-09-25 LAB — SCAN RESULT: NORMAL

## 2022-09-26 LAB — RYE IGE QN: NEGATIVE

## 2022-09-27 LAB — CCP AB SER IA-ACNC: 1.2

## 2022-10-03 ENCOUNTER — TELEPHONE (OUTPATIENT)
Dept: HEMATOLOGY ONCOLOGY | Facility: CLINIC | Age: 52
End: 2022-10-03

## 2022-10-03 NOTE — TELEPHONE ENCOUNTER
Good afternoon  The pt is scheduled for a sinus CT on 10/15  The pt's insurance is denying this request as not medically necessary stating they need clinical notes that state the following:    "-Doctor's notes that say you took four weeks of nose medicine (antiinflammatory, antihistamines, and/or antibiotics)  -The notes should also say your nose (sinus) problems are not getting better after 12 weeks  JULISA Clinical Guideline 009 for Sinus Maxillofacial CT was used to make this decision "    There is an option to perform a peer to peer by calling in to speak directly to a physician reviewer at 2 [phone] option 8  Tracking #[id number]    Please let us know if you will be performing the peer to peer or canceling the exam     Thank you

## 2022-10-04 DIAGNOSIS — J30.2 SEASONAL ALLERGIC RHINITIS, UNSPECIFIED TRIGGER: ICD-10-CM

## 2022-10-04 DIAGNOSIS — D72.820 LYMPHOCYTOSIS: Primary | ICD-10-CM

## 2022-10-04 NOTE — PROGRESS NOTES
Left voicemail for pt letting her know CT denied and appt canceled  Referral for ENT specialist ordered  Provided my direct line for any questions

## 2022-10-15 ENCOUNTER — APPOINTMENT (OUTPATIENT)
Dept: CT IMAGING | Facility: HOSPITAL | Age: 52
End: 2022-10-15
Attending: INTERNAL MEDICINE
Payer: COMMERCIAL

## 2022-10-15 ENCOUNTER — HOSPITAL ENCOUNTER (OUTPATIENT)
Dept: ULTRASOUND IMAGING | Facility: HOSPITAL | Age: 52
Discharge: HOME | End: 2022-10-15
Attending: INTERNAL MEDICINE
Payer: COMMERCIAL

## 2022-10-15 DIAGNOSIS — K21.9 GASTROESOPHAGEAL REFLUX DISEASE WITHOUT ESOPHAGITIS: ICD-10-CM

## 2022-10-15 DIAGNOSIS — J30.2 SEASONAL ALLERGIC RHINITIS, UNSPECIFIED TRIGGER: ICD-10-CM

## 2022-10-15 DIAGNOSIS — D72.820 LYMPHOCYTOSIS: ICD-10-CM

## 2022-10-15 PROCEDURE — 76700 US EXAM ABDOM COMPLETE: CPT

## 2022-10-26 ENCOUNTER — OFFICE VISIT (OUTPATIENT)
Dept: HEMATOLOGY ONCOLOGY | Facility: CLINIC | Age: 52
End: 2022-10-26
Payer: COMMERCIAL

## 2022-10-26 VITALS
SYSTOLIC BLOOD PRESSURE: 140 MMHG | HEART RATE: 99 BPM | DIASTOLIC BLOOD PRESSURE: 84 MMHG | OXYGEN SATURATION: 99 % | BODY MASS INDEX: 37.74 KG/M2 | HEIGHT: 63 IN | RESPIRATION RATE: 17 BRPM | WEIGHT: 213 LBS

## 2022-10-26 DIAGNOSIS — H74.13: Primary | ICD-10-CM

## 2022-10-26 PROCEDURE — 99213 OFFICE O/P EST LOW 20 MIN: CPT | Performed by: INTERNAL MEDICINE

## 2022-10-26 NOTE — PROGRESS NOTES
Hematology Outpatient Follow - Up Note  Edwardo Paul 47 y o  female MRN: @ Encounter: [id number]        Date:  10/26/2022        Assessment/ Plan:    1  Mild persistent leukocytosis with relative lymphocytosis with postnasal drip, bilateral ear eczema, physical examination showed possible fluid behind the eardrums bilaterally, insurance company denied CT scan of the sinuses    Patient to be seen by ENT for additional workup     No evidence of autoimmune leukocytosis with negative NAVIN, CCP, mildly elevated CRP    Most likely she has sleep apnea I think she needs to be tested for sleep apnea     Fatty liver by ultrasound of the abdomen no evidence of splenomegaly or other masses    Follow-up on as-needed basis        Labs and imaging studies are reviewed by ordering provider once results are available  If there are findings that need immediate attention, you will be contacted when results available  Discussing results and the implication on your healthcare is best discussed in person at your follow-up visit         HPI:    76-year-old  female with history of postnasal drip, allergic rhinitis, esophageal reflux, anxiety, vitamin-D deficiency, arthralgia, cholecystectomy who had been noticing mild persistent elevation of white blood count        Component      Latest Ref Rng & Units 1/22/2019 3/26/2019 7/9/2021 8/20/2022   WBC      4 31 - 10 16 Thousand/uL 9 94 10 27 (H) 10 49 (H) 10 95 (H)   Red Blood Cell Count      3 81 - 5 12 Million/uL 4 80 4 89 5 03 5 08   Hemoglobin      11 5 - 15 4 g/dL 10 5 (L) 12 9 14 5 15 3   HCT      34 8 - 46 1 % 36 4 41 0 44 4 46 3 (H)   MCV      82 - 98 fL 76 (L) 84 88 91   MCH      26 8 - 34 3 pg 21 9 (L) 26 4 (L) 28 8 30 1   MCHC      31 4 - 37 4 g/dL 28 8 (L) 31 5 32 7 33 0   RDW      11 6 - 15 1 % 16 5 (H) 21 6 (H) 13 2 13 4   MPV      8 9 - 12 7 fL 10 1 10 1 10 2 10 4   Platelet Count      394 - 390 Thousands/uL 227 182 227 179   nRBC      /100 WBCs 0 0 0 0   Neutrophils %      43 - 75 % 46 47 55 42 (L)   Immat GRANS %      0 - 2 % 0 0 0 0   Lymphocytes Relative      14 - 44 % 39 42 37 45 (H)   Monocytes Relative      4 - 12 % 12 8 6 9   Eosinophils      0 - 6 % 2 2 2 3   Basophils Relative      0 - 1 % 1 1 0 1   Absolute Neutrophils      1 85 - 7 62 Thousands/µL 4 68 4 87 5 68 4 58   Immature Grans Absolute      0 00 - 0 20 Thousand/uL 0 02 0 02 0 03 0 04   Lymphocytes Absolute      0 60 - 4 47 Thousands/µL 3 83 4 28 3 90 5 02 (H)   Absolute Monocytes      0 17 - 1 22 Thousand/µL 1 16 0 85 0 66 0 93   Absolute Eosinophils      0 00 - 0 61 Thousand/µL 0 20 0 19 0 18 0 32   Basophils Absolute      0 00 - 0 10 Thousands/µL 0 05 0 06 0 04 0 06   , she reports eczema of the ears bilaterally, snoring, arthralgia, stiffness in the morning lasting less than 1 hour with swelling, sinus pressure, ear aches, denied any night sweats low-grade fever or weight loss   Additional workup showed negative NAVIN, CCP, flow cytometry, mildly elevated CRP, ultrasound of the abdomen showed fatty liver  Interval History:        Previous Treatment:         Test Results:    Imaging: US abdomen complete    Result Date: 10/18/2022  Narrative: ABDOMEN ULTRASOUND, COMPLETE INDICATION:   D72 820: Lymphocytosis (symptomatic) K21 9: Gastro-esophageal reflux disease without esophagitis J30 2: Other seasonal allergic rhinitis  COMPARISON:  None TECHNIQUE:   Real-time ultrasound of the abdomen was performed with a curvilinear transducer with both volumetric sweeps and still imaging techniques  FINDINGS: PANCREAS: Partial obscuration by bowel gas  Visualized portions of the pancreas are within normal limits  AORTA AND IVC: Partial obscuration  Visualized portions are normal for patient age  LIVER: Size:  Enlarged  The liver measures 17 1 cm in the midclavicular line  Contour:  Surface contour is smooth  Parenchyma:  Increased echogenicity, posterior acoustic attenuation, decreased periportal echogenicity   No liver mass identified  Limited imaging of the main portal vein shows it to be patent and hepatopetal  BILIARY: Gallbladder surgically removed No intrahepatic biliary dilatation  CBD measures 5 0 mm  No choledocholithiasis  KIDNEY: Right kidney measures 12 9 x 5 9 x 5 5  cm  Volume 217 7 mL Kidney within normal limits  Left kidney measures 13 7 x 5 6 x 4 9 cm  Volume 197 2 mL Kidney within normal limits  SPLEEN: Measures 12 1 x 11 4 x 4 9 cm  Volume 355 8 mL Within upper normal limits  ASCITES:  None  Impression: Hepatomegaly Hepatic moderate steatosis Cholecystectomy Workstation performed: [id number]       Labs:   Lab Results   Component Value Date    WBC 9 62 09/23/2022    HGB 15 7 (H) 09/23/2022    HCT 46 7 (H) 09/23/2022    MCV 90 09/23/2022     09/23/2022     Lab Results   Component Value Date     07/31/2015    K 4 3 08/20/2022     08/20/2022    CO2 26 08/20/2022    ANIONGAP 6 07/31/2015    BUN 13 08/20/2022    CREATININE 0 77 08/20/2022    GLUCOSE 99 07/31/2015    GLUF 107 (H) 08/20/2022    CALCIUM 8 7 08/20/2022    AST 28 08/20/2022    ALT 33 08/20/2022    ALKPHOS 60 08/20/2022    PROT 7 7 07/31/2015    BILITOT 0 31 07/31/2015    EGFR 88 08/20/2022       Lab Results   Component Value Date    IRON 56 03/26/2019    FERRITIN 44 8 07/31/2015       No results found for: [id number]      ROS: Review of Systems   Constitutional: Negative for appetite change, chills, diaphoresis, fatigue and unexpected weight change  HENT:   Positive for hearing loss  Negative for mouth sores, nosebleeds, sore throat, trouble swallowing and voice change  Eyes: Negative for eye problems and icterus  Respiratory: Negative for chest tightness, cough, hemoptysis and wheezing  Cardiovascular: Negative for chest pain, leg swelling and palpitations  Gastrointestinal: Negative for abdominal distention, abdominal pain, blood in stool, constipation, diarrhea, nausea and vomiting     Endocrine: Negative for hot flashes  Genitourinary: Negative for bladder incontinence, difficulty urinating, dyspareunia, dysuria and frequency  Musculoskeletal: Negative for arthralgias, back pain, gait problem, neck pain and neck stiffness  Skin: Negative for itching and rash  Neurological: Negative for dizziness, gait problem, headaches, numbness, seizures and speech difficulty  Hematological: Negative for adenopathy  Does not bruise/bleed easily  Psychiatric/Behavioral: Negative for decreased concentration, depression, sleep disturbance and suicidal ideas  The patient is not nervous/anxious  Current Medications: Reviewed  Allergies: Reviewed  PMH/FH/SH:  Reviewed      Physical Exam:    Body surface area is 1 99 meters squared  Wt Readings from Last 3 Encounters:   10/26/22 96 6 kg (213 lb)   09/14/22 98 kg (216 lb)   08/05/22 98 3 kg (216 lb 11 2 oz)        Temp Readings from Last 3 Encounters:   09/14/22 (!) 97 °F (36 1 °C)   12/23/21 97 6 °F (36 4 °C)   07/09/21 98 1 °F (36 7 °C) (Oral)        BP Readings from Last 3 Encounters:   10/26/22 140/84   09/14/22 160/100   08/05/22 122/80         Pulse Readings from Last 3 Encounters:   10/26/22 99   09/14/22 (!) 111   08/05/22 102        Physical Exam  Vitals reviewed  Constitutional:       General: She is not in acute distress  Appearance: She is well-developed  She is not diaphoretic  HENT:      Head: Normocephalic and atraumatic  Left Ear: There is impacted cerumen  Ears:      Comments: Dry skin of the earlobes, tympanic membranes are dark with possibly fluid especially behind the left ear drum  Eyes:      Conjunctiva/sclera: Conjunctivae normal    Neck:      Trachea: No tracheal deviation  Cardiovascular:      Rate and Rhythm: Normal rate and regular rhythm  Heart sounds: No murmur heard  No friction rub  No gallop  Pulmonary:      Effort: Pulmonary effort is normal  No respiratory distress  Breath sounds: Normal breath sounds  No wheezing or rales  Chest:      Chest wall: No tenderness  Abdominal:      General: There is no distension  Palpations: Abdomen is soft  Tenderness: There is no abdominal tenderness  Musculoskeletal:      Cervical back: Normal range of motion and neck supple  Right lower leg: No edema  Left lower leg: No edema  Lymphadenopathy:      Cervical: No cervical adenopathy  Skin:     General: Skin is warm and dry  Coloration: Skin is not pale  Findings: No erythema  Neurological:      Mental Status: She is alert and oriented to person, place, and time  Psychiatric:         Behavior: Behavior normal          Thought Content: Thought content normal          Judgment: Judgment normal          ECO    Goals and Barriers:  Current Goal: Minimize effects of disease  Barriers: None  Patient's Capacity to Self Care:  Patient is able to self care      Code Status: @[facility]@

## 2022-11-04 ENCOUNTER — OFFICE VISIT (OUTPATIENT)
Dept: FAMILY MEDICINE CLINIC | Facility: CLINIC | Age: 52
End: 2022-11-04

## 2022-11-04 VITALS
HEART RATE: 82 BPM | WEIGHT: 212 LBS | HEIGHT: 64 IN | RESPIRATION RATE: 16 BRPM | BODY MASS INDEX: 36.19 KG/M2 | DIASTOLIC BLOOD PRESSURE: 92 MMHG | OXYGEN SATURATION: 97 % | SYSTOLIC BLOOD PRESSURE: 160 MMHG

## 2022-11-04 DIAGNOSIS — J01.00 ACUTE NON-RECURRENT MAXILLARY SINUSITIS: Primary | ICD-10-CM

## 2022-11-04 RX ORDER — AZITHROMYCIN 250 MG/1
TABLET, FILM COATED ORAL
Qty: 6 TABLET | Refills: 0 | Status: SHIPPED | OUTPATIENT
Start: 2022-11-04 | End: 2022-11-09

## 2022-11-04 NOTE — PROGRESS NOTES
Assessment/Plan:     Diagnoses and all orders for this visit:    Acute non-recurrent maxillary sinusitis  -     azithromycin (Zithromax) 250 mg tablet; Take 2 tablets (500 mg total) by mouth daily for 1 day, THEN 1 tablet (250 mg total) daily for 4 days  Kellie David ordered  Medication and s/e reviewed  Pt may utilize Nasacort daily for the next 7 days  She is to rest and keep well hydrated  Patient is encouraged to call our office for any questions/concerns, persistent or worsening symptoms  Patient states they understand and agree with treatment plan  Pt to f/u PRN  Subjective:      Patient ID: Johanna Nesbitt is a 47 y o  female  Pt presents today for increasing sinus pressure/pain for over 1 week  She also notes pain behind eyes and in teeth  She denies fever, chills body aches  She has been using Nasacort and Advil cold and sinus  Pt does have appt w/ ENT next week  Pt had negative covid test at home  The following portions of the patient's history were reviewed and updated as appropriate: allergies, current medications, past family history, past medical history, past social history, past surgical history and problem list     Review of Systems    As noted per HPI  Objective:      /92 (BP Location: Left arm, Patient Position: Sitting, Cuff Size: Standard)   Pulse 82   Resp 16   Ht 5' 3 5" (1 613 m)   Wt 96 2 kg (212 lb)   SpO2 97%   BMI 36 97 kg/m²          Physical Exam  Vitals reviewed  Constitutional:       General: She is not in acute distress  Appearance: She is not ill-appearing  HENT:      Ears:      Comments: Dry flaky right ear canal     Nose: Congestion and rhinorrhea present  Mouth/Throat:      Pharynx: No oropharyngeal exudate or posterior oropharyngeal erythema  Cardiovascular:      Pulses: Normal pulses  Heart sounds: Normal heart sounds  No murmur heard  Pulmonary:      Effort: Pulmonary effort is normal  No respiratory distress        Breath sounds: Normal breath sounds  No wheezing  Neurological:      Mental Status: She is alert and oriented to person, place, and time  Mental status is at baseline  Psychiatric:         Mood and Affect: Mood normal          Behavior: Behavior normal          Thought Content:  Thought content normal          Judgment: Judgment normal

## 2023-02-05 ENCOUNTER — AMB VIDEO VISIT (OUTPATIENT)
Dept: OTHER | Facility: HOSPITAL | Age: 53
End: 2023-02-05

## 2023-02-05 DIAGNOSIS — R30.0 DYSURIA: Primary | ICD-10-CM

## 2023-02-05 RX ORDER — CIPROFLOXACIN 250 MG/1
250 TABLET, FILM COATED ORAL EVERY 12 HOURS SCHEDULED
Qty: 14 TABLET | Refills: 0 | Status: SHIPPED | OUTPATIENT
Start: 2023-02-05 | End: 2023-02-12

## 2023-02-05 NOTE — CARE ANYWHERE EVISITS
Visit Summary for SINCERE JONAS - Gender: Female - Date of Birth: [id number]  Date: [id number] - Duration: 4 minutes  Patient: Bonilla Asif  Provider: Esperanza Stratton PA-C    Patient Contact Information  Address  [address]; [facility]  [phone]    Visit Topics    Triage Questions   What is your current physical address in the event of a medical emergency? Answer []  Are you allergic to any medications? Answer []  Are you now or could you be pregnant? Answer []  Do you have any immune system compromise or chronic lung   disease? Answer []  Do you have any vulnerable family members in the home (infant, pregnant, cancer, elderly)? Answer []     Conversation Transcripts  [0A][0A] [Notification] You are connected with Esperanza Stratton PA-C, Urgent Care Specialist [0A][Notification] SINCERE Gonzalez is located in [place]  [0A][Notification] SINCERE Gonzalez has shared health history  Blanca Fuentes  [0A]    Diagnosis  Dysuria    Procedures  Value: [id number] Code: CPT-4 UNLISTED E&M SERVICE    Medications Prescribed    No prescriptions ordered    Electronically signed by: Tonya Olivas(NPI [id number])

## 2023-02-05 NOTE — PROGRESS NOTES
Video Visit - Ewelina Steel 47 y o  female MRN: [id number]    REQUIRED DOCUMENTATION:         1  This service was provided via [facility]  2  Provider located at [address] [place] [id number] [phone]  3  [facility] provider: Reuben Mcwilliams PA-C   4  Identify all parties in room with patient during [facility] visit:  No one else  5  After connecting through [employer], patient was identified by name and date of birth  Patient was then informed that this was a Telemedicine visit and that the exam was being conducted confidentially over secure lines  My office door was closed  No one else was in the room  Patient acknowledged consent and understanding of privacy and security of the Telemedicine visit  I informed the patient that I have reviewed their record in Epic and presented the opportunity for them to ask any questions regarding the visit today  The patient agreed to participate  HPI  Patient started with pressure, bloating, frequency, burning with urination  She denies any fever, chills, nausea, vomiting, flank pain  Her symptoms started today  Physical Exam  Constitutional:       General: She is not in acute distress  Appearance: Normal appearance  She is not ill-appearing, toxic-appearing or diaphoretic  HENT:      Head: Normocephalic and atraumatic  Abdominal:      General: Abdomen is flat  Tenderness: There is no abdominal tenderness  There is no right CVA tenderness or left CVA tenderness  Skin:     General: Skin is dry  Neurological:      General: No focal deficit present  Mental Status: She is alert and oriented to person, place, and time  Psychiatric:         Mood and Affect: Mood normal          Behavior: Behavior normal          Diagnoses and all orders for this visit:    Dysuria  -     ciprofloxacin (CIPRO) 250 mg tablet;  Take 1 tablet (250 mg total) by mouth every 12 (twelve) hours for 7 days  -     UA w Reflex to Microscopic w Reflex to Culture -Lab Collect; Future    start antibiotics  UA ordered  Follow up with PCP  ER if worsen  Patient Instructions   Dysuria   WHAT YOU NEED TO KNOW:   Dysuria is difficulty urinating, or pain, burning, or discomfort with urination  Dysuria is usually a symptom of another problem  DISCHARGE INSTRUCTIONS:   Return to the emergency department if:   · You have severe back, side, or abdominal pain  · You have fever and shaking chills  · You vomit several times in a row  Contact your healthcare provider if:   · Your symptoms do not go away, even after treatment  · You have questions or concerns about your condition or care  Medicines:   · Medicines  may be given to help treat a bacterial infection or help decrease bladder spasms  · Take your medicine as directed  Contact your healthcare provider if you think your medicine is not helping or if you have side effects  Tell him of her if you are allergic to any medicine  Keep a list of the medicines, vitamins, and herbs you take  Include the amounts, and when and why you take them  Bring the list or the pill bottles to follow-up visits  Carry your medicine list with you in case of an emergency  Follow up with your healthcare provider as directed: Your healthcare provider may also refer you to a urologist or nephrologist to have additional testing  Write down your questions so you remember to ask them during your visits  Manage your dysuria:   · Drink more liquids  Liquids help flush out bacteria that may be causing an infection  Ask your healthcare provider how much liquid to drink each day and which liquids are best for you  · Take sitz baths as directed  Fill a bathtub with 4 to 6 inches of warm water  You may also use a sitz bath pan that fits over a toilet  Sit in the sitz bath for 20 minutes  Do this 2 to 3 times a day, or as directed  The warm water can help decrease pain and swelling       © Copyright [employer] 2022 Information is for End User's use only and may not be sold, redistributed or otherwise used for commercial purposes  All illustrations and images included in CareNotes® are the copyrighted property of A D A M , Inc  or Charlie Bullock  The above information is an  only  It is not intended as medical advice for individual conditions or treatments  Talk to your doctor, nurse or pharmacist before following any medical regimen to see if it is safe and effective for you

## 2023-02-05 NOTE — PATIENT INSTRUCTIONS
Dysuria   WHAT YOU NEED TO KNOW:   Dysuria is difficulty urinating, or pain, burning, or discomfort with urination  Dysuria is usually a symptom of another problem  DISCHARGE INSTRUCTIONS:   Return to the emergency department if:   You have severe back, side, or abdominal pain  You have fever and shaking chills  You vomit several times in a row  Contact your healthcare provider if:   Your symptoms do not go away, even after treatment  You have questions or concerns about your condition or care  Medicines:   Medicines  may be given to help treat a bacterial infection or help decrease bladder spasms  Take your medicine as directed  Contact your healthcare provider if you think your medicine is not helping or if you have side effects  Tell him of her if you are allergic to any medicine  Keep a list of the medicines, vitamins, and herbs you take  Include the amounts, and when and why you take them  Bring the list or the pill bottles to follow-up visits  Carry your medicine list with you in case of an emergency  Follow up with your healthcare provider as directed: Your healthcare provider may also refer you to a urologist or nephrologist to have additional testing  Write down your questions so you remember to ask them during your visits  Manage your dysuria:   Drink more liquids  Liquids help flush out bacteria that may be causing an infection  Ask your healthcare provider how much liquid to drink each day and which liquids are best for you  Take sitz baths as directed  Fill a bathtub with 4 to 6 inches of warm water  You may also use a sitz bath pan that fits over a toilet  Sit in the sitz bath for 20 minutes  Do this 2 to 3 times a day, or as directed  The warm water can help decrease pain and swelling  © Copyright [employer] 2022 Information is for End User's use only and may not be sold, redistributed or otherwise used for commercial purposes   All illustrations and images included in [employer] [address] are the copyrighted property of [employer] M [employer] Inc  or [facility] Micheal Orta   The above information is an  only  It is not intended as medical advice for individual conditions or treatments  Talk to your doctor, nurse or pharmacist before following any medical regimen to see if it is safe and effective for you  [Midline] : trachea located in midline position [de-identified] : no nasal fracture [Normal] : no rashes

## 2023-04-28 ENCOUNTER — HOSPITAL ENCOUNTER (OUTPATIENT)
Dept: MAMMOGRAPHY | Facility: CLINIC | Age: 53
Discharge: HOME | End: 2023-04-28

## 2023-04-28 DIAGNOSIS — Z12.31 SCREENING MAMMOGRAM, ENCOUNTER FOR: ICD-10-CM

## 2023-05-08 ENCOUNTER — OFFICE VISIT (OUTPATIENT)
Dept: FAMILY MEDICINE CLINIC | Facility: CLINIC | Age: 53
End: 2023-05-08

## 2023-05-08 VITALS
RESPIRATION RATE: 16 BRPM | HEART RATE: 97 BPM | HEIGHT: 64 IN | WEIGHT: 212 LBS | SYSTOLIC BLOOD PRESSURE: 128 MMHG | BODY MASS INDEX: 36.19 KG/M2 | DIASTOLIC BLOOD PRESSURE: 84 MMHG | OXYGEN SATURATION: 98 %

## 2023-05-08 DIAGNOSIS — M54.16 LUMBAR BACK PAIN WITH RADICULOPATHY AFFECTING LOWER EXTREMITY: Primary | ICD-10-CM

## 2023-05-08 PROBLEM — D72.820 LYMPHOCYTOSIS: Status: RESOLVED | Noted: 2022-09-14 | Resolved: 2023-05-08

## 2023-05-08 RX ORDER — CYCLOBENZAPRINE HCL 10 MG
10 TABLET ORAL
Qty: 20 TABLET | Refills: 0 | Status: SHIPPED | OUTPATIENT
Start: 2023-05-08

## 2023-05-08 RX ORDER — NABUMETONE 750 MG/1
750 TABLET, FILM COATED ORAL 2 TIMES DAILY
Qty: 30 TABLET | Refills: 0 | Status: SHIPPED | OUTPATIENT
Start: 2023-05-08

## 2023-05-08 NOTE — PROGRESS NOTES
Assessment/Plan:    1  Low back pain with radiculpathy - try relafen twice daily with food, continue nexium in the morning, flexeril at night, would benefit from PT  Referral placed  F/u physical  F/u as needed    Subjective:   Chief Complaint   Patient presents with   • Back Pain     Lower back with left side sciatic pain  Right side hip pain  Started a week and a half ago  Worsened from packing for move      Patient ID: Angelito Dent is a 46 y o  female  A week and a half ago after seeding her new yard and water  After that started with pain in low back, radiating down hip to leg  This morning woke with left low back pain and numb left foot  Constant low back pain, feels like a vice  Radiates with sitting, better with walking  Sits for work, tries to walk every hour but little relief  Tried taking advil with some relief but upset stomachs  Heating pad with some relief  Right leg radiation worse them left       Back Pain        The following portions of the patient's history were reviewed and updated as appropriate: allergies, current medications, past family history, past medical history, past social history, past surgical history and problem list     Past Medical History:   Diagnosis Date   • Abnormal weight gain    • Anxiety    • Arthralgia    • Stomatitis     last assessed 7/22/13; resolved 5/4/16     Past Surgical History:   Procedure Laterality Date   • CHOLECYSTECTOMY LAPAROSCOPIC       Family History   Problem Relation Age of Onset   • Diabetes Mother    • Breast cancer Mother 76   • Heart failure Father    • Coronary artery disease Father    • Diabetes Father    • Other Father         pure hypercholesterolemia   • No Known Problems Daughter    • No Known Problems Daughter    • Pancreatic cancer Paternal Grandmother    • No Known Problems Maternal Aunt    • No Known Problems Maternal Aunt    • Breast cancer Paternal Aunt    • Cancer Family    • Mental illness Neg Hx    • Substance Abuse Neg Hx Social History     Socioeconomic History   • Marital status: /Civil Union     Spouse name: Not on file   • Number of children: Not on file   • Years of education: Not on file   • Highest education level: Not on file   Occupational History   • Not on file   Tobacco Use   • Smoking status: Never   • Smokeless tobacco: Never   Vaping Use   • Vaping Use: Never used   Substance and Sexual Activity   • Alcohol use: Yes     Alcohol/week: 1 0 standard drink     Types: 1 Glasses of wine per week     Comment: Social   • Drug use: No   • Sexual activity: Yes     Partners: Male   Other Topics Concern   • Not on file   Social History Narrative    Caffeine use - Nominal     Uses safety equipment - smoke detectors     Uses seat belts      Social Determinants of Health     Financial Resource Strain: Not on file   Food Insecurity: Not on file   Transportation Needs: Not on file   Physical Activity: Not on file   Stress: Not on file   Social Connections: Not on file   Intimate Partner Violence: Not on file   Housing Stability: Not on file       Current Outpatient Medications:   •  Ascorbic Acid (VITAMIN C PO), Take by mouth, Disp: , Rfl:   •  calcium carbonate (OS-ERICKA) 600 MG tablet, Take 1 tablet (600 mg total) by mouth 2 (two) times a day with meals, Disp: , Rfl:   •  cholecalciferol (VITAMIN D3) 1,000 units tablet, Take 2 tablets (2,000 Units total) by mouth daily, Disp: , Rfl:   •  esomeprazole (NexIUM) 20 mg capsule, Take 1 capsule by mouth daily, Disp: , Rfl:   •  Multiple Vitamins-Minerals (HAIR SKIN NAILS PO), Take by mouth, Disp: , Rfl:   •  azelastine (ASTELIN) 0 1 % nasal spray, 1 spray into each nostril 2 (two) times a day Use in each nostril as directed (Patient not taking: Reported on 5/8/2023), Disp: 5 mL, Rfl: 3  •  mometasone (ELOCON) 0 1 % cream, Apply topically daily (Patient not taking: Reported on 5/8/2023), Disp: 45 g, Rfl: 0    Review of Systems   Constitutional: Negative  Genitourinary: Negative  "   Musculoskeletal: Positive for back pain  Neurological: Negative  Hematological: Negative  Psychiatric/Behavioral: Negative  Objective:    Vitals:    05/08/23 0936   BP: 142/90   BP Location: Left arm   Patient Position: Sitting   Cuff Size: Large   Pulse: 97   Resp: 16   SpO2: 98%   Weight: 96 2 kg (212 lb)   Height: 5' 3 5\" (1 613 m)     BP Readings from Last 3 Encounters:   05/08/23 128/84   11/04/22 160/92   10/26/22 140/84        Physical Exam  Constitutional:       Appearance: Normal appearance  She is well-developed and normal weight  HENT:      Head: Normocephalic and atraumatic  Cardiovascular:      Rate and Rhythm: Normal rate and regular rhythm  Pulses: Normal pulses  Heart sounds: Normal heart sounds  Pulmonary:      Effort: Pulmonary effort is normal       Breath sounds: Normal breath sounds  Musculoskeletal:         General: Normal range of motion  Cervical back: Normal range of motion and neck supple  Comments: Low back pain L3/4/5, DTR +2 PT=AT, SLR positive on right, heel/toe walk normal  Full sensation   Skin:     General: Skin is warm  Neurological:      General: No focal deficit present  Mental Status: She is alert and oriented to person, place, and time  Psychiatric:         Mood and Affect: Mood normal          Behavior: Behavior normal          Thought Content:  Thought content normal          Judgment: Judgment normal                "

## 2023-05-15 ENCOUNTER — EVALUATION (OUTPATIENT)
Dept: PHYSICAL THERAPY | Age: 53
End: 2023-05-15

## 2023-05-15 DIAGNOSIS — M54.16 LUMBAR BACK PAIN WITH RADICULOPATHY AFFECTING LOWER EXTREMITY: ICD-10-CM

## 2023-05-15 NOTE — PROGRESS NOTES
PT Evaluation     Today's date: 5/15/2023  Patient name: [facility]  : 1970  MRN: [id number]  Referring provider: Matilde Pitt*  Dx:   Encounter Diagnosis     ICD-10-CM    1  Lumbar back pain with radiculopathy affecting lower extremity  M54 16 Ambulatory Referral to Physical Therapy          Start Time: 1530  Stop Time: 1615  Total time in clinic (min): 45 minutes    Assessment  Assessment details: Patient presents complaining of LBP, which has been ongoing for about 8 months when she was moving  She was doing yardwork about 2 5 weeks ago she began having pain  She reports the pain is worse with prolonged sitting which is aggravating when she is sitting for awhile  She locates her pain to her central low back as well as pain into her B/L thighs, she reports some numbness into both feet  She has no recent imaging       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     Patient presents with limitations in thoracolumbar ROM and proximal hip strength  Patient would benefit from skilled physical therapy to address limitations and deficits  Patient provided with HEP  Patient made aware of condition as well as the proposed treatment plan, including risks, benefits and alternatives  Impairments: abnormal or restricted ROM, activity intolerance, impaired physical strength, lacks appropriate home exercise program and pain with function     Prognosis: good    Goals  ST- demonstrate compliancy with HEP in 1 week     Decrease reported pain to 4/10 at worst and with activity, to improve functional capacity, within 3 weeks   Improve thoracolumbar range of motion to minimal limitation with no complaints of pain, within 3 weeks     LT- Improve FOTO score to specified value to improve patients perceived benefit of therapy, in 8 weeks   Improve B/L LE strength to 4+/5 to improve ability to complete ADL's at home, within 8 weeks   Complete full day at work with no complaints of pain, within 10 weeks     Plan  Plan details: Physical therapy with focus on there ex and manual therapy to improve ability to complete tasks around the house and complete functional activities, use of modalities as needed     Patient would benefit from: skilled physical therapy  Referral necessary: No  Planned modality interventions: cryotherapy, TENS and thermotherapy: hydrocollator packs  Planned therapy interventions: ADL training, balance, balance/weight bearing training, gait training, manual therapy, joint mobilization, neuromuscular re-education, strengthening, stretching, therapeutic activities and therapeutic exercise  Frequency: 2x week  Duration in weeks: 12  Plan of Care beginning date: 5/15/2023  Plan of Care expiration date: 2023  Treatment plan discussed with: patient        Subjective Evaluation    History of Present Illness  Mechanism of injury: yardwork   Pain  Current pain ratin  At best pain ratin  At worst pain ratin  Location: LBP, B/L LE's   Quality: tight, dull ache and radiating  Relieving factors: heat, relaxation and rest  Aggravating factors: sitting  Progression: no change      Diagnostic Tests  No diagnostic tests performed  Treatments  No previous or current treatments  Patient Goals  Patient goals for therapy: decreased pain, independence with ADLs/IADLs and increased strength          Objective     General Comments:      Lumbar Comments  Ttp along central low back @ B/L PSIS    Decreased sensation noted on L LE compared to R     rom  Thoracolumbar motions all moderately limited secondary to pain, no DP evidenced     mmt  Hip flexion L=4* R=4*  Hip abduction L=4 R=4  Hip adduction L=4 R=4  Knee extension L=4+ R=4+  Knee flexion L=4+ R=4+    Special tests   (-) slump, SLR   (+) SI provocation L>R    Joint play   CPA's unable to assess due to increased pain              Precautions: none       Manuals                                                                 Neuro Re-Ed Hermelinda              S/L barby              SLR             Hip 3-way              mutlfidi press                                       Ther Ex             Nustep              SANJAY?                                                                                            Ther Activity                                       Gait Training                                       Modalities

## 2023-05-18 ENCOUNTER — OFFICE VISIT (OUTPATIENT)
Dept: PHYSICAL THERAPY | Age: 53
End: 2023-05-18

## 2023-05-18 DIAGNOSIS — M54.16 LUMBAR BACK PAIN WITH RADICULOPATHY AFFECTING LOWER EXTREMITY: Primary | ICD-10-CM

## 2023-05-18 NOTE — PROGRESS NOTES
"Daily Note     Today's date: 2023  Patient name: Narinder Browne  : 1970  MRN: [id number]  Referring provider: RADHA Bergman  Dx:   Encounter Diagnosis     ICD-10-CM    1  Lumbar back pain with radiculopathy affecting lower extremity  M54 16                      Subjective: Reports taking her anti-inflammatories the last few days, no notable change yet  Objective: See treatment diary below      Assessment: Tolerated treatment well  Patient would benefit from continued PT, did well with first tx and was able to demonstrate HEP with no cueing needed  Plan: Continue per plan of care  Precautions: none       Manuals              Gentle CPA's L2-5  CW                                                    Neuro Re-Ed             Bridges  25x5\"             S/L clamshells  25x3\" ea             SLR             Hip 3-way              mutlfidi press 2x10 GTB                                      Ther Ex             Nustep  8'             SANJAY?  3'             SANJAY press 2x10                                                                              Ther Activity                                       Gait Training                                       Modalities                                            "

## 2023-05-22 ENCOUNTER — APPOINTMENT (OUTPATIENT)
Dept: PHYSICAL THERAPY | Age: 53
End: 2023-05-22
Payer: COMMERCIAL

## 2023-05-23 ENCOUNTER — OFFICE VISIT (OUTPATIENT)
Dept: PHYSICAL THERAPY | Age: 53
End: 2023-05-23

## 2023-05-23 DIAGNOSIS — M54.16 LUMBAR BACK PAIN WITH RADICULOPATHY AFFECTING LOWER EXTREMITY: Primary | ICD-10-CM

## 2023-05-23 NOTE — PROGRESS NOTES
"Daily Note     Today's date: 2023  Patient name: Keith Thakkar  : 1970  MRN: [id number]  Referring provider: RADHA Mcdonald  Dx:   Encounter Diagnosis     ICD-10-CM    1  Lumbar back pain with radiculopathy affecting lower extremity  M54 16                      Subjective: Reports feeling more pain today with some shocking pain down to her R toe  Objective: See treatment diary below      Assessment: Tolerated treatment fair  Patient would benefit from continued PT, did well with tx today with some complaints of pain during clamshells, but was able to complete tx today with some adaptations  Plan: Continue per plan of care        Precautions: none       Manuals             Gentle CPA's L2-5  CW  CW                                                   Neuro Re-Ed             Bridges  25x5\"  3x10x5\"            S/L clamshells  25x3\" ea  25x3\" RTB           SLR             Hip 3-way              mutlfidi press 2x10 GTB            TA cont   20x5\"            BKFO  20x            Ther Ex             Nustep  8'  10'            SANJAY? 3'  3'            SANJAY press 2x10  np                                                                            Ther Activity                                       Gait Training                                       Modalities                                            "

## 2023-05-26 ENCOUNTER — OFFICE VISIT (OUTPATIENT)
Dept: PHYSICAL THERAPY | Age: 53
End: 2023-05-26

## 2023-05-26 DIAGNOSIS — M54.16 LUMBAR BACK PAIN WITH RADICULOPATHY AFFECTING LOWER EXTREMITY: Primary | ICD-10-CM

## 2023-05-26 NOTE — PROGRESS NOTES
"Daily Note     Today's date: 2023  Patient name: Libra Guerrero  : 1970  MRN: [id number]  Referring provider: RADHA Stearns  Dx:   Encounter Diagnosis     ICD-10-CM    1  Lumbar back pain with radiculopathy affecting lower extremity  M54 16                      Subjective: Reports feeling continued L hip pain  Objective: See treatment diary below      Assessment: Tolerated treatment well  Patient would benefit from continued PT, was limited with table exercises today with no complaints of pain throughout tx  Plan: Continue per plan of care        Precautions: none       Manuals            Gentle CPA's L2-5  CW  CW  CW                                                  Neuro Re-Ed             Bridges  25x5\"  3x10x5\"  2x10x5\"           S/L clamshells  25x3\" ea  25x3\" RTB           SLR             Hip 3-way              mutlfidi press 2x10 GTB            TA cont   20x5\"  20x5\"           BKFO  20x  20x ea           TA marches   20x ea           Ther Ex             Nustep  8'  10'  10' RB          SANJAY? 3'  3'  3'           SANJAY press 2x10  np           PB flexion 3-way                                                                  Ther Activity                                       Gait Training                                       Modalities                                            "

## 2023-06-01 ENCOUNTER — OFFICE VISIT (OUTPATIENT)
Dept: PHYSICAL THERAPY | Age: 53
End: 2023-06-01

## 2023-06-01 DIAGNOSIS — M54.16 LUMBAR BACK PAIN WITH RADICULOPATHY AFFECTING LOWER EXTREMITY: Primary | ICD-10-CM

## 2023-06-01 NOTE — PROGRESS NOTES
"Daily Note     Today's date: 2023  Patient name: Zenaida Serrato  : 1970  MRN: [id number]  Referring provider: RADHA Schilling  Dx:   Encounter Diagnosis     ICD-10-CM    1  Lumbar back pain with radiculopathy affecting lower extremity  M54 16                      Subjective: Reports feeling a little better today after taking it easy this weekend  Objective: See treatment diary below      Assessment: Tolerated treatment well  Patient would benefit from continued PT, did well with progressions with no complaints of pain throughout today's tx  Plan: Continue per plan of care  Precautions: none       Manuals           Gentle CPA's L2-5  CW  CW  CW  CW                                                Neuro Re-Ed             Bridges  25x5\"  3x10x5\"  2x10x5\"           S/L clamshells  25x3\" ea  25x3\" RTB           SLR             Hip 3-way              mutlfidi press 2x10 GTB            TA cont   20x5\"  20x5\"           BKFO  20x  20x ea  20x ea         TA marches   20x ea  20x ea          Bird dogs     1x10   ea         Prone hip ext     np p!           Dead bugs     2x10  ea         Ther Ex             Nustep  8'  10'  10' RB 10' RB          SANJAY? 3'  3'  3'           SANJAY press 2x10  np           PB flexion 3-way                                                                  Ther Activity                                       Gait Training                                       Modalities                                            "

## 2023-06-05 ENCOUNTER — OFFICE VISIT (OUTPATIENT)
Dept: PHYSICAL THERAPY | Age: 53
End: 2023-06-05
Payer: COMMERCIAL

## 2023-06-05 DIAGNOSIS — M54.16 LUMBAR BACK PAIN WITH RADICULOPATHY AFFECTING LOWER EXTREMITY: Primary | ICD-10-CM

## 2023-06-05 PROCEDURE — 97110 THERAPEUTIC EXERCISES: CPT | Performed by: PHYSICAL THERAPIST

## 2023-06-05 PROCEDURE — 97112 NEUROMUSCULAR REEDUCATION: CPT | Performed by: PHYSICAL THERAPIST

## 2023-06-05 NOTE — PROGRESS NOTES
"Daily Note     Today's date: 2023  Patient name: Kamryn Marin  : 1970  MRN: [id number]  Referring provider: SUSIE Horne*  Dx:   Encounter Diagnosis     ICD-10-CM    1  Lumbar back pain with radiculopathy affecting lower extremity  M54 16                      Subjective: Reports having more pain today  Objective: See treatment diary below      Assessment: Tolerated treatment well  Patient would benefit from continued PT, was challenged by PB 3-way, as well as LTR today, but was able to complete  Plan: Continue per plan of care  Precautions: none       Manuals          Gentle CPA's L2-5  CW  CW  CW  CW                                                Neuro Re-Ed             Bridges  25x5\"  3x10x5\"  2x10x5\"           S/L clamshells  25x3\" ea  25x3\" RTB           SLR             Hip 3-way              mutlfidi press 2x10 GTB            TA cont   20x5\"  20x5\"   20x5\"         BKFO  20x  20x ea  20x ea 20x ea         TA marches   20x ea  20x ea  20x ea         Bird dogs     1x10   ea         Prone hip ext     np p!           Dead bugs     2x10  ea         Sciatic nerve glide     30x                     Ther Ex             Nustep  8'  10'  10' RB 10' RB  10'         SANJAY? 3'  3'  3'           SANJAY press 2x10  np           PB flexion 3-way      5x10\" ea        LTR      10x10\" ea                                                Ther Activity                                       Gait Training                                       Modalities                                            "

## 2023-06-08 ENCOUNTER — OFFICE VISIT (OUTPATIENT)
Dept: PHYSICAL THERAPY | Age: 53
End: 2023-06-08
Payer: COMMERCIAL

## 2023-06-08 DIAGNOSIS — G89.29 CHRONIC LOW BACK PAIN WITH BILATERAL SCIATICA, UNSPECIFIED BACK PAIN LATERALITY: Primary | ICD-10-CM

## 2023-06-08 DIAGNOSIS — M54.41 CHRONIC LOW BACK PAIN WITH BILATERAL SCIATICA, UNSPECIFIED BACK PAIN LATERALITY: Primary | ICD-10-CM

## 2023-06-08 DIAGNOSIS — M54.42 CHRONIC LOW BACK PAIN WITH BILATERAL SCIATICA, UNSPECIFIED BACK PAIN LATERALITY: Primary | ICD-10-CM

## 2023-06-08 PROCEDURE — 97112 NEUROMUSCULAR REEDUCATION: CPT | Performed by: PHYSICAL THERAPIST

## 2023-06-08 PROCEDURE — 97140 MANUAL THERAPY 1/> REGIONS: CPT | Performed by: PHYSICAL THERAPIST

## 2023-06-08 PROCEDURE — 97110 THERAPEUTIC EXERCISES: CPT | Performed by: PHYSICAL THERAPIST

## 2023-06-08 NOTE — PROGRESS NOTES
"Daily Note     Today's date: 2023  Patient name: Tomi Jackson  : 1970  MRN: [id number]  Referring provider: SUSIE Lake*  Dx: No diagnosis found  Subjective: Pt stated that her pain is no better and no worse than after last treatment  Stated that she still has the pain radiating down her left buttock  Objective: See treatment diary below      Assessment: Tolerated treatment well  Patient would benefit from continued PT  Pt was challenged during dead bug exercise and expressed having some pain during  She had to take a couple breaks throughout  She performed exercises well with good form  Plan: Continue per plan of care  Pt was treated by KIRK Ling, under direct supervision of Mayank Lui DPT     Precautions: none       Manuals        Gentle CPA's L2-5  CW  CW  CW  CW  NT                                              Neuro Re-Ed             Bridges  25x5\"  3x10x5\"  2x10x5\"    3x10x5\"   GTB       S/L clamshells  25x3\" ea  25x3\" RTB           SLR      3x10 ea       Hip 3-way              mutlfidi press 2x10 GTB            TA cont   20x5\"  20x5\"   20x5\"  20x5\"       BKFO  20x  20x ea  20x ea 20x ea  3x10  ea       TA marches   20x ea  20x ea  20x ea         Bird dogs     1x10   ea            Prone hip ext     np p!           Dead bugs     2x10  ea  3x10  ea       Sciatic nerve glide     30x                     Ther Ex             Nustep  8'  10'  10' RB 10' RB  10'  10'  RB       SANJAY? 3'  3'  3'           SANJAY press 2x10  np           PB flexion 3-way      5x10\" ea        LTR      10x10\" ea                                                Ther Activity                                       Gait Training                                       Modalities                                            "

## 2023-06-12 ENCOUNTER — OFFICE VISIT (OUTPATIENT)
Dept: PHYSICAL THERAPY | Age: 53
End: 2023-06-12
Payer: COMMERCIAL

## 2023-06-12 DIAGNOSIS — M54.41 CHRONIC LOW BACK PAIN WITH BILATERAL SCIATICA, UNSPECIFIED BACK PAIN LATERALITY: Primary | ICD-10-CM

## 2023-06-12 DIAGNOSIS — G89.29 CHRONIC LOW BACK PAIN WITH BILATERAL SCIATICA, UNSPECIFIED BACK PAIN LATERALITY: Primary | ICD-10-CM

## 2023-06-12 DIAGNOSIS — M54.16 LUMBAR BACK PAIN WITH RADICULOPATHY AFFECTING LOWER EXTREMITY: ICD-10-CM

## 2023-06-12 DIAGNOSIS — M54.42 CHRONIC LOW BACK PAIN WITH BILATERAL SCIATICA, UNSPECIFIED BACK PAIN LATERALITY: Primary | ICD-10-CM

## 2023-06-12 PROCEDURE — 97140 MANUAL THERAPY 1/> REGIONS: CPT | Performed by: PHYSICAL THERAPIST

## 2023-06-12 PROCEDURE — 97110 THERAPEUTIC EXERCISES: CPT | Performed by: PHYSICAL THERAPIST

## 2023-06-12 PROCEDURE — 97112 NEUROMUSCULAR REEDUCATION: CPT | Performed by: PHYSICAL THERAPIST

## 2023-06-12 NOTE — PROGRESS NOTES
"Daily Note     Today's date: 2023  Patient name: Milan Mcadams  : 1970  MRN: [id number]  Referring provider: Donny Bliss*  Dx:   Encounter Diagnosis     ICD-10-CM    1  Chronic low back pain with bilateral sciatica, unspecified back pain laterality  M54 41     G89 29     M54 42       2  Lumbar back pain with radiculopathy affecting lower extremity  M54 16                      Subjective: Reports feeling less distal pain, but feels no change in low back pain  Objective: See treatment diary below      Assessment: Tolerated treatment well  Patient would benefit from continued PT, patient provided with information on       Plan: Continue per plan of care  Precautions: none       Manuals        Gentle CPA's L2-5  CW  CW  CW  CW  NT CW                                              Neuro Re-Ed             Bridges  25x5\"  3x10x5\"  2x10x5\"    3x10x5\"   GTB 3x10x5\" GTB       S/L clamshells  25x3\" ea  25x3\" RTB           SLR      3x10 ea 3x10 ea       Hip 3-way              mutlfidi press 2x10 GTB            TA cont   20x5\"  20x5\"   20x5\"  20x5\"       BKFO  20x  20x ea  20x ea 20x ea  3x10  ea       TA marches   20x ea  20x ea  20x ea         Bird dogs     1x10   ea            Prone hip ext     np p!           Dead bugs     2x10  ea  3x10  ea 3x10 ea       Sciatic nerve glide     30x        rows       2x15 15#      LPD        2x15 10@                   Ther Ex             Nustep  8'  10'  10' RB 10' RB  10'  10'  RB 10' RB      SANJAY? 3'  3'  3'           SANJAY press 2x10  np           PB flexion 3-way      5x10\" ea        LTR      10x10\" ea                                                Ther Activity                                       Gait Training                                       Modalities                                            "

## 2023-06-15 ENCOUNTER — OFFICE VISIT (OUTPATIENT)
Dept: PHYSICAL THERAPY | Age: 53
End: 2023-06-15
Payer: COMMERCIAL

## 2023-06-15 DIAGNOSIS — G89.29 CHRONIC BILATERAL LOW BACK PAIN WITH SCIATICA, SCIATICA LATERALITY UNSPECIFIED: Primary | ICD-10-CM

## 2023-06-15 DIAGNOSIS — M54.40 CHRONIC BILATERAL LOW BACK PAIN WITH SCIATICA, SCIATICA LATERALITY UNSPECIFIED: Primary | ICD-10-CM

## 2023-06-15 PROCEDURE — 97140 MANUAL THERAPY 1/> REGIONS: CPT | Performed by: PHYSICAL THERAPIST

## 2023-06-15 PROCEDURE — 97110 THERAPEUTIC EXERCISES: CPT | Performed by: PHYSICAL THERAPIST

## 2023-06-15 PROCEDURE — 97112 NEUROMUSCULAR REEDUCATION: CPT | Performed by: PHYSICAL THERAPIST

## 2023-06-15 NOTE — PROGRESS NOTES
"Daily Note     Today's date: 6/15/2023  Patient name: Melinda Lepe  : 1970  MRN: [id number]  Referring provider: Nedra Blizzard, Jolyne Ochoa*  Dx:   Encounter Diagnosis     ICD-10-CM    1  Chronic bilateral low back pain with sciatica, sciatica laterality unspecified  M54 40     G89 29                      Subjective: Pt reported that she is feeling about the same  Objective: See treatment diary below      Assessment: Tolerated treatment fair  Patient would benefit from continued PT  Pt has expressed some pain throughout the treatment session  She stated that a lot of movement she does causes an aching shooting pain  Plan: Continue per plan of care  Pt was treated by KIRK Fitzgerald, under direct supervision of Liseth Dash DPT     Precautions: none       Manuals 5/18  5/23  5/26  6/1  6/5  6/8 6/12  6/15     Gentle CPA's L2-5  CW  CW  CW  CW  NT CW  NT                                            Neuro Re-Ed             Bridges  25x5\"  3x10x5\"  2x10x5\"    3x10x5\"   GTB 3x10x5\" GTB  3x10x5\"  GTB     S/L clamshells  25x3\" ea  25x3\" RTB           SLR      3x10 ea 3x10 ea  2x8  Ea   3#     Hip 3-way              mutlfidi press 2x10 GTB            TA cont   20x5\"  20x5\"   20x5\"  20x5\" 20x5\"      BKFO  20x  20x ea  20x ea 20x ea  3x10  ea  3x10  ea     TA marches   20x ea  20x ea  20x ea         Bird dogs     1x10   ea            Prone hip ext     np p!           Dead bugs     2x10  ea  3x10  ea 3x10 ea       Sciatic nerve glide     30x        rows       2x15 15# 3x10  15#     LPD        2x15 10# 2x15  11#                  Ther Ex             Nustep  8'  10'  10' RB 10' RB  10'  10'  RB 10' RB 10'     SANJAY? 3'  3'  3'           SANJAY press 2x10  np           PB flexion 3-way      5x10\" ea        LTR      10x10\" ea                                                Ther Activity                                       Gait Training                                       Modalities                             "

## 2023-06-20 ENCOUNTER — OFFICE VISIT (OUTPATIENT)
Dept: PHYSICAL THERAPY | Age: 53
End: 2023-06-20
Payer: COMMERCIAL

## 2023-06-20 DIAGNOSIS — M54.42 CHRONIC LOW BACK PAIN WITH BILATERAL SCIATICA, UNSPECIFIED BACK PAIN LATERALITY: ICD-10-CM

## 2023-06-20 DIAGNOSIS — M54.41 CHRONIC LOW BACK PAIN WITH BILATERAL SCIATICA, UNSPECIFIED BACK PAIN LATERALITY: ICD-10-CM

## 2023-06-20 DIAGNOSIS — G89.29 CHRONIC LOW BACK PAIN WITH BILATERAL SCIATICA, UNSPECIFIED BACK PAIN LATERALITY: ICD-10-CM

## 2023-06-20 DIAGNOSIS — M54.40 CHRONIC BILATERAL LOW BACK PAIN WITH SCIATICA, SCIATICA LATERALITY UNSPECIFIED: Primary | ICD-10-CM

## 2023-06-20 DIAGNOSIS — G89.29 CHRONIC BILATERAL LOW BACK PAIN WITH SCIATICA, SCIATICA LATERALITY UNSPECIFIED: Primary | ICD-10-CM

## 2023-06-20 PROCEDURE — 97140 MANUAL THERAPY 1/> REGIONS: CPT | Performed by: PHYSICAL THERAPIST

## 2023-06-20 PROCEDURE — 97110 THERAPEUTIC EXERCISES: CPT | Performed by: PHYSICAL THERAPIST

## 2023-06-20 PROCEDURE — 97112 NEUROMUSCULAR REEDUCATION: CPT | Performed by: PHYSICAL THERAPIST

## 2023-06-20 NOTE — PROGRESS NOTES
"Daily Note     Today's date: 2023  Patient name: Betzy Grewal  : 1970  MRN: [id number]  Referring provider: Reinier Pardo*  Dx:   Encounter Diagnosis     ICD-10-CM    1  Chronic bilateral low back pain with sciatica, sciatica laterality unspecified  M54 40     G89 29       2  Chronic low back pain with bilateral sciatica, unspecified back pain laterality  M54 41     G89 29     M54 42                      Subjective: Reports having a lot more L sided low back pain coming in today, does feel a little better  Objective: See treatment diary below      Assessment: Tolerated treatment well  Patient would benefit from continued PT, did well with tx with no complaints during tx today  Plan: Continue per plan of care        Precautions: none       Manuals 6/8 6/12  6/15 6/20     Gentle CPA's L2-5  NT CW  NT CW                             Neuro Re-Ed        Bridges  3x10x5\"   GTB 3x10x5\" GTB  3x10x5\"  GTB 20x5\" BTB    S/L clamshells     20x3\" BTB    SLR 3x10 ea 3x10 ea  2x8  Ea   3# 3x10 ea 3#     Hip 3-way         mutlfidi press        TA cont  20x5\" 20x5\"      BKFO 3x10  ea  3x10  ea     TA marches        Bird dogs            Prone hip ext         Dead bugs  3x10  ea 3x10 ea       Sciatic nerve glide        rows  2x15 15# 3x10  15# 2x15 16#     LPD   2x15 10# 2x15  11# 2x15 12#            Ther Ex        Nustep  10'  RB 10' RB 10' 10'     SANJAY?        SANJAY press        PB flexion 3-way         LTR                                 Ther Activity                        Gait Training                        Modalities                             "

## 2023-06-22 ENCOUNTER — APPOINTMENT (OUTPATIENT)
Dept: PHYSICAL THERAPY | Age: 53
End: 2023-06-22
Payer: COMMERCIAL

## 2023-06-26 ENCOUNTER — OFFICE VISIT (OUTPATIENT)
Dept: PHYSICAL THERAPY | Age: 53
End: 2023-06-26
Payer: COMMERCIAL

## 2023-06-26 DIAGNOSIS — M54.40 CHRONIC BILATERAL LOW BACK PAIN WITH SCIATICA, SCIATICA LATERALITY UNSPECIFIED: Primary | ICD-10-CM

## 2023-06-26 DIAGNOSIS — G89.29 CHRONIC BILATERAL LOW BACK PAIN WITH SCIATICA, SCIATICA LATERALITY UNSPECIFIED: Primary | ICD-10-CM

## 2023-06-26 PROCEDURE — 97112 NEUROMUSCULAR REEDUCATION: CPT | Performed by: PHYSICAL THERAPIST

## 2023-06-26 PROCEDURE — 97140 MANUAL THERAPY 1/> REGIONS: CPT | Performed by: PHYSICAL THERAPIST

## 2023-06-26 PROCEDURE — 97110 THERAPEUTIC EXERCISES: CPT | Performed by: PHYSICAL THERAPIST

## 2023-06-26 NOTE — PROGRESS NOTES
"Daily Note     Today's date: 2023  Patient name: Cody Huerta  : 1970  MRN: [id number]  Referring provider: Yulia Burdick*  Dx:   Encounter Diagnosis     ICD-10-CM    1  Chronic bilateral low back pain with sciatica, sciatica laterality unspecified  M54 40     G89 29                       Subjective: Pt reported that her back \"doesn't feel too bad\"  Objective: See treatment diary below      Assessment: Tolerated treatment well  Patient would benefit from continued PT  Pt expressed some pain in the side of B/L hips after exercises  She did well with new exercises with no complaints of pain  Plan: Continue per plan of care        Pt was treated by KIRK Marquis, under direct supervision of Roger Crump DPT     Precautions: none       Manuals 6/8 6/12  6/15 6/20  6/26   Gentle CPA's L2-5  NT CW  NT CW  NT                           Neuro Re-Ed        Bridges  3x10x5\"   GTB 3x10x5\" GTB  3x10x5\"  GTB 20x5\" BTB 25x5\"  BTB   S/L clamshells     20x3\" BTB 3x10  BTB   SLR 3x10 ea 3x10 ea  2x8  Ea   3# 3x10 ea 3#  2x10  Ea   4#   Hip 3-way         mutlfidi press     10x  B/L  13#   TA cont  20x5\" 20x5\"      BKFO 3x10  ea  3x10  ea  3x12  ea   TA marches        Bird dogs            Prone hip ext         Dead bugs  3x10  ea 3x10 ea       Sciatic nerve glide        rows  2x15 15# 3x10  15# 2x15 16#     LPD   2x15 10# 2x15  11# 2x15 12#    Thoracolumbar mini rotations     15x B/L  13#   Ther Ex        Nustep  10'  RB 10' RB 10' 10'  10'  UB   SANJAY?        SANJAY press        PB flexion 3-way         LTR                                 Ther Activity                        Gait Training                        Modalities                             "

## 2023-06-29 ENCOUNTER — APPOINTMENT (OUTPATIENT)
Dept: PHYSICAL THERAPY | Age: 53
End: 2023-06-29
Payer: COMMERCIAL

## 2023-07-26 ENCOUNTER — TELEPHONE (OUTPATIENT)
Dept: PAIN MEDICINE | Facility: CLINIC | Age: 53
End: 2023-07-26

## 2023-07-26 NOTE — TELEPHONE ENCOUNTER
Patient was brought into room for triage and vitals were taken. Patient blood pressure and pulse  was very high( please see vitals in the office visit). Patient is aware that her vitals was high. Patient states that this was because of being very nervous. Patient i was informed that another blood pressure will be taken after her visit with Dr. Sherryle Matte. While patient was waiting for Dr. Sherryle Matte to come into the room. Patient came to MA station and stated that she is leaving. Patient states that the reason is that she is very nervous and knowing that I will retake her vitals again is making her more nervous. Patient states that she call to reschedule and left office.

## 2023-08-25 ENCOUNTER — APPOINTMENT (OUTPATIENT)
Dept: LAB | Age: 53
End: 2023-08-25
Payer: COMMERCIAL

## 2023-08-25 DIAGNOSIS — Z00.8 HEALTH EXAMINATION IN POPULATION SURVEY: ICD-10-CM

## 2023-08-25 LAB
CHOLEST SERPL-MCNC: 206 MG/DL
EST. AVERAGE GLUCOSE BLD GHB EST-MCNC: 126 MG/DL
HBA1C MFR BLD: 6 %
HDLC SERPL-MCNC: 51 MG/DL
LDLC SERPL CALC-MCNC: 126 MG/DL (ref 0–100)
NONHDLC SERPL-MCNC: 155 MG/DL
TRIGL SERPL-MCNC: 145 MG/DL

## 2023-08-25 PROCEDURE — 83036 HEMOGLOBIN GLYCOSYLATED A1C: CPT

## 2023-08-25 PROCEDURE — 36415 COLL VENOUS BLD VENIPUNCTURE: CPT

## 2023-08-25 PROCEDURE — 80061 LIPID PANEL: CPT

## 2023-08-29 ENCOUNTER — TELEPHONE (OUTPATIENT)
Age: 53
End: 2023-08-29

## 2023-08-29 NOTE — TELEPHONE ENCOUNTER
Patient calling for a new patient appointment. Papilloma of the left inner thigh, patient interested in having the area treated with Cryotherapy. Patient prefers to see a female provider, no specific location preference. Patient is willing to travel. As of right now, no openings. Patient was placed on the wait list and advised that we will reach out once an available appointment opens up. Patient was also advised to call in frequently for any cancellations.

## 2023-09-28 DIAGNOSIS — F41.1 GENERALIZED ANXIETY DISORDER: Primary | ICD-10-CM

## 2023-10-25 ENCOUNTER — AMB VIDEO VISIT (OUTPATIENT)
Dept: OTHER | Facility: HOSPITAL | Age: 53
End: 2023-10-25
Payer: COMMERCIAL

## 2023-10-25 DIAGNOSIS — J01.40 ACUTE NON-RECURRENT PANSINUSITIS: Primary | ICD-10-CM

## 2023-10-25 PROCEDURE — 99212 OFFICE O/P EST SF 10 MIN: CPT | Performed by: PHYSICIAN ASSISTANT

## 2023-10-25 RX ORDER — CEFDINIR 300 MG/1
300 CAPSULE ORAL EVERY 12 HOURS SCHEDULED
Qty: 20 CAPSULE | Refills: 0 | Status: SHIPPED | OUTPATIENT
Start: 2023-10-25 | End: 2023-11-04

## 2023-10-25 NOTE — PROGRESS NOTES
Video Visit - Francie Laws 46 y.o. female MRN: [id number]    REQUIRED DOCUMENTATION:         1. This service was provided via [employer]. 2. Provider located at [address] [place] [id number] [phone].  3. [facility] provider: Sondra Voss PA-C.  4. Identify all parties in room with patient during [facility] visit:  No one else  5. After connecting through televideo, patient was identified by name and date of birth. Patient was then informed that this was a Telemedicine visit and that the exam was being conducted confidentially over secure lines. My office door was closed. No one else was in the room. Patient acknowledged consent and understanding of privacy and security of the Telemedicine visit. I informed the patient that I have reviewed their record in Epic and presented the opportunity for them to ask any questions regarding the visit today. The patient agreed to participate. VITALS: Heart Rate: 67 BPM, Respiratory Rate: 14 RPM, Temperature  97.8 ° F, Blood Pressure  106/70  mmHg, Pulse Ox  98  % on RA    HPI  Pt reports sinus infection similar to prior started about 1 week ago. Reports pressure in ethmoid sinuses, pain radiating to teeth, fluid in ears, slight HA, PND. Tried neti pot, saline, advil without relief. Physical Exam  Constitutional:       General: She is not in acute distress. Appearance: Normal appearance. She is obese. She is not ill-appearing or toxic-appearing. HENT:      Head: Normocephalic and atraumatic. Nose: No rhinorrhea. Comments: Sounds mildly congested     Mouth/Throat:      Mouth: Mucous membranes are moist.   Eyes:      Conjunctiva/sclera: Conjunctivae normal.   Cardiovascular:      Rate and Rhythm: Normal rate. Pulmonary:      Effort: Pulmonary effort is normal. No respiratory distress. Breath sounds: No wheezing (no gross audible wheeze through computer).    Musculoskeletal:      Cervical back: Normal range of motion. Lymphadenopathy:      Cervical: No cervical adenopathy. Skin:     Findings: No rash (on face or neck). Neurological:      Mental Status: She is alert. Cranial Nerves: No dysarthria or facial asymmetry. Psychiatric:         Mood and Affect: Mood normal.         Behavior: Behavior normal.         Diagnoses and all orders for this visit:    Acute non-recurrent pansinusitis  -     cefdinir (OMNICEF) 300 mg capsule; Take 1 capsule (300 mg total) by mouth every 12 (twelve) hours for 10 days      Patient Instructions   Sinus infections are typically viral and will get better on their own in 7-10 days. You should try symptomatic relief in the meantime with OTC (Claritin or Zyrtec), Afrin up to 3 days then switch to Flonase, and Sudafed. You can also try sinus rinses with a neti pot or nasal lavage (be sure to use distilled water.) If your symptoms do not improve after 7-10 days, you may need antibiotics because it is more likely to be bacterial at that point. Follow-up with your primary care physician for recheck in 2-3 business days. Go to the ER for sudden severe headache, high fevers, change in vision, seizure activity or anything else that is concerning.     [facility] Phone number is [phone] if you need assistance or have further questions

## 2023-10-25 NOTE — CARE ANYWHERE EVISITS
Visit Summary for SINCERE JONAS - Gender: Female - Date of Birth: [id number]  Date: [id number] - Duration: 9 minutes  Patient: Holly Howard  Provider: Hieu Gil PA-C    Patient Contact Information  Address  [place]  [phone]    Visit Topics    Triage Questions   What is your current physical address in the event of a medical emergency? Answer []  Are you allergic to any medications? Answer []  Are you now or could you be pregnant? Answer []  Do you have any immune system compromise or chronic lung   disease? Answer []  Do you have any vulnerable family members in the home (infant, pregnant, cancer, elderly)? Answer []     Conversation Transcripts  [0A][0A] [Notification] You are connected with Hieu Gil PA-C, [facility] is located in [facility] [address]. [0A][Notification] SINCERE Rodriguez has shared health history. Aria Castaneda .[0A]    Diagnosis  Acute ethmoidal sinusitis, unspecified    Procedures  Value: [id number] Code: CPT-4 UNLISTED E&M SERVICE    Medications Prescribed    No prescriptions ordered    Electronically signed by: Mercedes Chan(NPI [id number])

## 2023-10-25 NOTE — PATIENT INSTRUCTIONS
Sinus infections are typically viral and will get better on their own in 7-10 days. You should try symptomatic relief in the meantime with OTC (Claritin or Zyrtec), Afrin up to 3 days then switch to Flonase, and Sudafed. You can also try sinus rinses with a neti pot or nasal lavage (be sure to use distilled water.) If your symptoms do not improve after 7-10 days, you may need antibiotics because it is more likely to be bacterial at that point. Follow-up with your primary care physician for recheck in 2-3 business days. Go to the ER for sudden severe headache, high fevers, change in vision, seizure activity or anything else that is concerning.     [facility] Phone number is [phone] if you need assistance or have further questions

## 2023-10-27 ENCOUNTER — TELEPHONE (OUTPATIENT)
Dept: OTHER | Facility: HOSPITAL | Age: 53
End: 2023-10-27

## 2023-10-27 DIAGNOSIS — J01.40 ACUTE NON-RECURRENT PANSINUSITIS: Primary | ICD-10-CM

## 2023-10-27 RX ORDER — AZITHROMYCIN 250 MG/1
TABLET, FILM COATED ORAL
Qty: 6 TABLET | Refills: 0 | Status: SHIPPED | OUTPATIENT
Start: 2023-10-27 | End: 2023-11-01

## 2023-11-20 ENCOUNTER — OFFICE VISIT (OUTPATIENT)
Dept: FAMILY MEDICINE CLINIC | Facility: CLINIC | Age: 53
End: 2023-11-20
Payer: COMMERCIAL

## 2023-11-20 VITALS
RESPIRATION RATE: 17 BRPM | OXYGEN SATURATION: 99 % | HEIGHT: 64 IN | BODY MASS INDEX: 36.35 KG/M2 | HEART RATE: 95 BPM | TEMPERATURE: 97.5 F | DIASTOLIC BLOOD PRESSURE: 90 MMHG | SYSTOLIC BLOOD PRESSURE: 130 MMHG | WEIGHT: 212.9 LBS

## 2023-11-20 DIAGNOSIS — Z12.11 ENCOUNTER FOR SCREENING COLONOSCOPY: ICD-10-CM

## 2023-11-20 DIAGNOSIS — F41.1 GENERALIZED ANXIETY DISORDER: ICD-10-CM

## 2023-11-20 DIAGNOSIS — R69 TAKING MEDICATION FOR CHRONIC DISEASE: ICD-10-CM

## 2023-11-20 DIAGNOSIS — Z00.00 ANNUAL PHYSICAL EXAM: Primary | ICD-10-CM

## 2023-11-20 DIAGNOSIS — M54.41 CHRONIC BILATERAL LOW BACK PAIN WITH BILATERAL SCIATICA: ICD-10-CM

## 2023-11-20 DIAGNOSIS — F41.8 PERFORMANCE ANXIETY: ICD-10-CM

## 2023-11-20 DIAGNOSIS — N95.0 POST-MENOPAUSAL BLEEDING: ICD-10-CM

## 2023-11-20 DIAGNOSIS — E55.9 VITAMIN D DEFICIENCY: ICD-10-CM

## 2023-11-20 DIAGNOSIS — K21.9 GASTROESOPHAGEAL REFLUX DISEASE WITHOUT ESOPHAGITIS: ICD-10-CM

## 2023-11-20 DIAGNOSIS — G89.29 CHRONIC BILATERAL LOW BACK PAIN WITH BILATERAL SCIATICA: ICD-10-CM

## 2023-11-20 DIAGNOSIS — Z23 ENCOUNTER FOR IMMUNIZATION: ICD-10-CM

## 2023-11-20 DIAGNOSIS — M54.42 CHRONIC BILATERAL LOW BACK PAIN WITH BILATERAL SCIATICA: ICD-10-CM

## 2023-11-20 PROCEDURE — 99214 OFFICE O/P EST MOD 30 MIN: CPT | Performed by: PHYSICIAN ASSISTANT

## 2023-11-20 PROCEDURE — 90471 IMMUNIZATION ADMIN: CPT

## 2023-11-20 PROCEDURE — 90686 IIV4 VACC NO PRSV 0.5 ML IM: CPT

## 2023-11-20 PROCEDURE — 99396 PREV VISIT EST AGE 40-64: CPT | Performed by: PHYSICIAN ASSISTANT

## 2023-11-20 RX ORDER — RANITIDINE HCL 75 MG
75 TABLET ORAL 2 TIMES DAILY
Qty: 60 TABLET | Refills: 0 | Status: SHIPPED | OUTPATIENT
Start: 2023-11-20

## 2023-11-20 RX ORDER — PROPRANOLOL HYDROCHLORIDE 40 MG/1
TABLET ORAL
Qty: 20 TABLET | Refills: 0 | Status: SHIPPED | OUTPATIENT
Start: 2023-11-20

## 2023-11-20 RX ORDER — DIPHENOXYLATE HYDROCHLORIDE AND ATROPINE SULFATE 2.5; .025 MG/1; MG/1
1 TABLET ORAL DAILY
COMMUNITY

## 2023-11-20 NOTE — PROGRESS NOTES
Assessment/Plan:    SUMMER - improved with Zoloft, will try Propanolol 40 mg to take 30 minutes prior to appointment as patient could not be seen by specialist due to elevated BP/panic at time of appt, see note 7/26/2023 and is hindering her treatment plan for low back pain. 2. Low back pain - failed PT, placed order for XR to start    3. Elevated BP in office - reviewed home BP readings with cuff that we have calibrated here, within normal range at home no need for treatment, will order routine labs, BP checked before patient left and was starting to decrease    4. GERD - on nexium, try to switch to Zantac 75 mg bid, consider GI follow up    5. Post menopausal bleeding - refer to gyn for eval    F/u as needed    Subjective:   Chief Complaint   Patient presents with    Physical Exam      Patient ID: Sudhir Silverman is a 46 y.o. female. Patient here complaining of increasing in panic attacks. Happening 2-3 a week, would feel anxious all day, would have vivid dreams and then wake up panicked. Restarted Zoloft and has not had a panic attack since starting, but still upset and anxious but much improved. Notes after her last panic attack she did have vaginal spotting, prior to that LMP was 5/2022. Also with increasing low back pain but cannot even see physician due to panic. BP at home is 118/78, 120/74, 126/77, 111/59, 115/78, 110/72. Took BP when having panic attack and was 160/100 like at doctor office. Has been on Nexium for GERD, wondering if this is good or should she switch, works well.         The following portions of the patient's history were reviewed and updated as appropriate: allergies, current medications, past family history, past medical history, past social history, past surgical history, and problem list.    Past Medical History:   Diagnosis Date    Abnormal weight gain     Anxiety     Arthralgia     Panic attack     Stomatitis     last assessed 7/22/13; resolved 5/4/16     Past Surgical History:   Procedure Laterality Date    CHOLECYSTECTOMY LAPAROSCOPIC       Family History   Problem Relation Age of Onset    Diabetes Mother     Breast cancer Mother 76    Heart failure Father     Coronary artery disease Father     Diabetes Father     Other Father         pure hypercholesterolemia    No Known Problems Daughter     No Known Problems Daughter     Pancreatic cancer Paternal Grandmother     No Known Problems Maternal Aunt     No Known Problems Maternal Aunt     Breast cancer Paternal Aunt     Cancer Family     Mental illness Neg Hx     Substance Abuse Neg Hx      Social History     Socioeconomic History    Marital status: /Civil Union     Spouse name: Not on file    Number of children: Not on file    Years of education: Not on file    Highest education level: Not on file   Occupational History    Not on file   Tobacco Use    Smoking status: Never    Smokeless tobacco: Never   Vaping Use    Vaping Use: Never used   Substance and Sexual Activity    Alcohol use:  Yes     Alcohol/week: 1.0 standard drink of alcohol     Types: 1 Glasses of wine per week     Comment: Social    Drug use: No    Sexual activity: Yes     Partners: Male   Other Topics Concern    Not on file   Social History Narrative    Caffeine use - Nominal     Uses safety equipment - smoke detectors     Uses seat belts      Social Determinants of Health     Financial Resource Strain: Not on file   Food Insecurity: Not on file   Transportation Needs: Not on file   Physical Activity: Not on file   Stress: Not on file   Social Connections: Not on file   Intimate Partner Violence: Not on file   Housing Stability: Not on file       Current Outpatient Medications:     calcium carbonate (OS-ERICKA) 600 MG tablet, Take 1 tablet (600 mg total) by mouth 2 (two) times a day with meals, Disp: , Rfl:     esomeprazole (NexIUM) 20 mg capsule, Take 1 capsule by mouth daily, Disp: , Rfl:     multivitamin (THERAGRAN) TABS, Take 1 tablet by mouth daily, Disp: , Rfl:     sertraline (ZOLOFT) 50 mg tablet, Take 1 tablet (50 mg total) by mouth daily, Disp: 90 tablet, Rfl: 3    Ascorbic Acid (VITAMIN C PO), Take by mouth (Patient not taking: Reported on 11/20/2023), Disp: , Rfl:     cholecalciferol (VITAMIN D3) 1,000 units tablet, Take 2 tablets (2,000 Units total) by mouth daily (Patient not taking: Reported on 11/20/2023), Disp: , Rfl:     Review of Systems          Objective:    Vitals:    11/20/23 0812   BP: 164/100   Pulse: 95   Resp: 17   Temp: 97.5 °F (36.4 °C)   TempSrc: Oral   SpO2: 99%   Weight: 96.6 kg (212 lb 14.4 oz)   Height: 5' 3.75" (1.619 m)        Physical Exam  Constitutional:       Appearance: Normal appearance. She is well-developed and normal weight. HENT:      Head: Normocephalic and atraumatic. Neck:      Vascular: No carotid bruit. Cardiovascular:      Rate and Rhythm: Normal rate and regular rhythm. Pulses: Normal pulses. Heart sounds: Normal heart sounds. Pulmonary:      Effort: Pulmonary effort is normal.      Breath sounds: Normal breath sounds. Musculoskeletal:         General: Normal range of motion. Cervical back: Normal range of motion and neck supple. Right lower leg: No edema. Left lower leg: No edema. Skin:     General: Skin is warm. Neurological:      General: No focal deficit present. Mental Status: She is alert and oriented to person, place, and time. Psychiatric:         Mood and Affect: Mood normal.         Behavior: Behavior normal.         Thought Content:  Thought content normal.         Judgment: Judgment normal.

## 2023-11-20 NOTE — PROGRESS NOTES
ADULT ANNUAL PHYSICAL  [id number] [facility]    NAME: Graciela Early  AGE: 46 y.o. SEX: female  : 1970     DATE: 2023     Assessment and Plan:     Healthy 46year old female      Immunizations and preventive care screenings were discussed with patient today. Appropriate education was printed on patient's after visit summary. Counseling:  Alcohol/drug use: discussed moderation in alcohol intake, the recommendations for healthy alcohol use, and avoidance of illicit drug use. Dental Health: discussed importance of regular tooth brushing, flossing, and dental visits. Injury prevention: discussed safety/seat belts, safety helmets, smoke detectors, carbon dioxide detectors, and smoking near bedding or upholstery. Sexual health: discussed sexually transmitted diseases, partner selection, use of condoms, avoidance of unintended pregnancy, and contraceptive alternatives. Exercise: the importance of regular exercise/physical activity was discussed. Recommend exercise 3-5 times per week for at least 30 minutes. Depression Screening and Follow-up Plan: Patient was screened for depression during today's encounter. They screened negative with a PHQ-2 score of 0. Return in 1 year (on 2024). Chief Complaint:     Chief Complaint   Patient presents with    Physical Exam      History of Present Illness:     Adult Annual Physical   Patient here for a comprehensive physical exam. The patient reports problems - see other note . Diet and Physical Activity  Diet/Nutrition: well balanced diet. Exercise: walking. Depression Screening  PHQ-2/9 Depression Screening    Little interest or pleasure in doing things: 0 - not at all  Feeling down, depressed, or hopeless: 0 - not at all  PHQ-2 Score: 0  PHQ-2 Interpretation: Negative depression screen       General Health  Sleep: sleeps well.    Hearing: normal - bilateral.  Vision: goes for regular eye exams. Dental: regular dental visits. /GYN Health  Follows with gynecology? no   Patient is: postmenopausal May 2023, spotting September 2023, will refer to gyn  Mammo UTD  Cologard ordered       Review of Systems:     Review of Systems   Constitutional: Negative. HENT: Negative. Eyes: Negative. Respiratory: Negative. Cardiovascular: Negative. Gastrointestinal: Negative. Endocrine: Negative. Genitourinary: Negative. Musculoskeletal: Negative. Skin: Negative. Allergic/Immunologic: Negative. Neurological: Negative. Hematological: Negative. Psychiatric/Behavioral: Negative. Past Medical History:     Past Medical History:   Diagnosis Date    Abnormal weight gain     Anxiety     Arthralgia     Panic attack     Stomatitis     last assessed 7/22/13; resolved 5/4/16      Past Surgical History:     Past Surgical History:   Procedure Laterality Date    CHOLECYSTECTOMY LAPAROSCOPIC        Social History:     Social History     Socioeconomic History    Marital status: /Civil Union     Spouse name: None    Number of children: None    Years of education: None    Highest education level: None   Occupational History    None   Tobacco Use    Smoking status: Never    Smokeless tobacco: Never   Vaping Use    Vaping Use: Never used   Substance and Sexual Activity    Alcohol use:  Yes     Alcohol/week: 1.0 standard drink of alcohol     Types: 1 Glasses of wine per week     Comment: Social    Drug use: No    Sexual activity: Yes     Partners: Male   Other Topics Concern    None   Social History Narrative    Caffeine use - Nominal     Uses safety equipment - smoke detectors     Uses seat belts      Social Determinants of Health     Financial Resource Strain: Not on file   Food Insecurity: Not on file   Transportation Needs: Not on file   Physical Activity: Not on file   Stress: Not on file   Social Connections: Not on file   Intimate Partner Violence: Not on file   Housing Stability: Not on file      Family History:     Family History   Problem Relation Age of Onset    Diabetes Mother     Breast cancer Mother 76    Heart failure Father     Coronary artery disease Father     Diabetes Father     Other Father         pure hypercholesterolemia    No Known Problems Daughter     No Known Problems Daughter     Pancreatic cancer Paternal Grandmother     No Known Problems Maternal Aunt     No Known Problems Maternal Aunt     Breast cancer Paternal Aunt     Cancer Family     Mental illness Neg Hx     Substance Abuse Neg Hx       Current Medications:     Current Outpatient Medications   Medication Sig Dispense Refill    calcium carbonate (OS-ERICKA) 600 MG tablet Take 1 tablet (600 mg total) by mouth 2 (two) times a day with meals      esomeprazole (NexIUM) 20 mg capsule Take 1 capsule by mouth daily      multivitamin (THERAGRAN) TABS Take 1 tablet by mouth daily      propranolol (INDERAL) 40 mg tablet Tale 1 tablet 1 hour prior to appointment for anxiety 20 tablet 0    ranitidine (ZANTAC) 75 MG tablet Take 1 tablet (75 mg total) by mouth 2 (two) times a day 60 tablet 0    sertraline (ZOLOFT) 50 mg tablet Take 1 tablet (50 mg total) by mouth daily 90 tablet 3     No current facility-administered medications for this visit. Allergies: Allergies   Allergen Reactions    Codeine Tachycardia    Penicillins Hives    Prednisone Irritability and Hyperactivity    Sulfa Antibiotics Hives    Tetracycline       Physical Exam:     /90   Pulse 95   Temp 97.5 °F (36.4 °C) (Oral)   Resp 17   Ht 5' 3.75" (1.619 m)   Wt 96.6 kg (212 lb 14.4 oz)   SpO2 99%   BMI 36.83 kg/m²     Physical Exam  Constitutional:       Appearance: Normal appearance. She is well-developed and normal weight. HENT:      Head: Normocephalic and atraumatic. Right Ear: Hearing normal.      Left Ear: Hearing normal.      Mouth/Throat:      Pharynx: Uvula midline.    Eyes:      Extraocular Movements: Extraocular movements intact. Conjunctiva/sclera: Conjunctivae normal.      Pupils: Pupils are equal, round, and reactive to light. Neck:      Thyroid: No thyromegaly. Cardiovascular:      Rate and Rhythm: Normal rate and regular rhythm. Pulses: Normal pulses. Heart sounds: Normal heart sounds. No murmur heard. Pulmonary:      Effort: Pulmonary effort is normal.      Breath sounds: Normal breath sounds. Abdominal:      General: Abdomen is flat. Bowel sounds are normal. There is no distension. Palpations: Abdomen is soft. There is no mass. Tenderness: There is no abdominal tenderness. Musculoskeletal:         General: Normal range of motion. Cervical back: Normal range of motion and neck supple. Lymphadenopathy:      Cervical: No cervical adenopathy. Skin:     General: Skin is warm. Neurological:      General: No focal deficit present. Mental Status: She is alert and oriented to person, place, and time. Cranial Nerves: No cranial nerve deficit. Deep Tendon Reflexes: Reflexes normal.   Psychiatric:         Mood and Affect: Mood normal.         Behavior: Behavior normal.         Thought Content:  Thought content normal.         Judgment: Judgment normal.          Devonte Fraga PA-C  [address]

## 2023-11-20 NOTE — PATIENT INSTRUCTIONS
Wellness Visit for Adults   AMBULATORY CARE:   A wellness visit  is when you see your healthcare provider to get screened for health problems. Your healthcare provider will also give you advice on how to stay healthy. Write down your questions so you remember to ask them. Ask your healthcare provider how often you should have a wellness visit. What happens at a wellness visit:  Your healthcare provider will ask about your health, and your family history of health problems. This includes high blood pressure, heart disease, and cancer. He or she will ask if you have symptoms that concern you, if you smoke, and about your mood. You may also be asked about your intake of medicines, supplements, food, and alcohol. Any of the following may be done: Your weight  will be checked. Your height may also be checked so your body mass index (BMI) can be calculated. Your BMI shows if you are at a healthy weight. Your blood pressure  and heart rate will be checked. Your temperature may also be checked. Blood and urine tests  may be done. Blood tests may be done to check your cholesterol levels. Abnormal cholesterol levels increase your risk for heart disease and stroke. You may also need a blood or urine test to check for diabetes if you are at increased risk. Urine tests may be done to look for signs of an infection or kidney disease. A physical exam  includes checking your heartbeat and lungs with a stethoscope. Your healthcare provider may also check your skin to look for sun damage. Screening tests  may be recommended. A screening test is done to check for diseases that may not cause symptoms. The screening tests you may need depend on your age, gender, family history, and lifestyle habits. For example, colorectal screening may be recommended if you are 48years old or older. Screening tests you need if you are a woman:   A Pap smear  is used to screen for cervical cancer.  Pap smears are usually done every 3 to 5 years depending on your age. You may need them more often if you have had abnormal Pap smear test results in the past. Ask your healthcare provider how often you should have a Pap smear. A mammogram  is an x-ray of your breasts to screen for breast cancer. Experts recommend mammograms every 2 years starting at age 48 years. You may need a mammogram at age 52 years or younger if you have an increased risk for breast cancer. Talk to your healthcare provider about when you should start having mammograms and how often you need them. Vaccines you may need:   Get an influenza vaccine  every year. The influenza vaccine protects you from the flu. Several types of viruses cause the flu. The viruses change over time, so new vaccines are made each year. Get a tetanus-diphtheria (Td) booster vaccine  every 10 years. This vaccine protects you against tetanus and diphtheria. Tetanus is a severe infection that may cause painful muscle spasms and lockjaw. Diphtheria is a severe bacterial infection that causes a thick covering in the back of your mouth and throat. Get a human papillomavirus (HPV) vaccine  if you are female and aged 23 to 32 or male 23 to 24 and never received it. This vaccine protects you from HPV infection. HPV is the most common infection spread by sexual contact. HPV may also cause vaginal, penile, and anal cancers. Get a pneumococcal vaccine  if you are aged 72 years or older. The pneumococcal vaccine is an injection given to protect you from pneumococcal disease. Pneumococcal disease is an infection caused by pneumococcal bacteria. The infection may cause pneumonia, meningitis, or an ear infection. Get a shingles vaccine  if you are 60 or older, even if you have had shingles before. The shingles vaccine is an injection to protect you from the varicella-zoster virus. This is the same virus that causes chickenpox.  Shingles is a painful rash that develops in people who had chickenpox or have been exposed to the virus. How to eat healthy:  My Plate is a model for planning healthy meals. It shows the types and amounts of foods that should go on your plate. Fruits and vegetables make up about half of your plate, and grains and protein make up the other half. A serving of dairy is included on the side of your plate. The amount of calories and serving sizes you need depends on your age, gender, weight, and height. Examples of healthy foods are listed below:  Eat a variety of vegetables  such as dark green, red, and orange vegetables. You can also include canned vegetables low in sodium (salt) and frozen vegetables without added butter or sauces. Eat a variety of fresh fruits , canned fruit in 100% juice, frozen fruit, and dried fruit. Include whole grains. At least half of the grains you eat should be whole grains. Examples include whole-wheat bread, wheat pasta, brown rice, and whole-grain cereals such as oatmeal.    Eat a variety of protein foods such as seafood (fish and shellfish), lean meat, and poultry without skin (turkey and chicken). Examples of lean meats include pork leg, shoulder, or tenderloin, and beef round, sirloin, tenderloin, and extra lean ground beef. Other protein foods include eggs and egg substitutes, beans, peas, soy products, nuts, and seeds. Choose low-fat dairy products such as skim or 1% milk or low-fat yogurt, cheese, and cottage cheese. Limit unhealthy fats  such as butter, hard margarine, and shortening. Exercise:  Exercise at least 30 minutes per day on most days of the week. Some examples of exercise include walking, biking, dancing, and swimming. You can also fit in more physical activity by taking the stairs instead of the elevator or parking farther away from stores. Include muscle strengthening activities 2 days each week. Regular exercise provides many health benefits.  It helps you manage your weight, and decreases your risk for type 2 diabetes, heart disease, stroke, and high blood pressure. Exercise can also help improve your mood. Ask your healthcare provider about the best exercise plan for you. General health and safety guidelines:   Do not smoke. Nicotine and other chemicals in cigarettes and cigars can cause lung damage. Ask your healthcare provider for information if you currently smoke and need help to quit. E-cigarettes or smokeless tobacco still contain nicotine. Talk to your healthcare provider before you use these products. Limit alcohol. A drink of alcohol is 12 ounces of beer, 5 ounces of wine, or 1½ ounces of liquor. Lose weight, if needed. Being overweight increases your risk of certain health conditions. These include heart disease, high blood pressure, type 2 diabetes, and certain types of cancer. Protect your skin. Do not sunbathe or use tanning beds. Use sunscreen with a SPF 15 or higher. Apply sunscreen at least 15 minutes before you go outside. Reapply sunscreen every 2 hours. Wear protective clothing, hats, and sunglasses when you are outside. Drive safely. Always wear your seatbelt. Make sure everyone in your car wears a seatbelt. A seatbelt can save your life if you are in an accident. Do not use your cell phone when you are driving. This could distract you and cause an accident. Pull over if you need to make a call or send a text message. Practice safe sex. Use latex condoms if are sexually active and have more than one partner. Your healthcare provider may recommend screening tests for sexually transmitted infections (STIs). Wear helmets, lifejackets, and protective gear. Always wear a helmet when you ride a bike or motorcycle, go skiing, or play sports that could cause a head injury. Wear protective equipment when you play sports. Wear a lifejacket when you are on a boat or doing water sports.     © Copyright [facility] 2023 Information is for End User's use only and may not be sold, redistributed or otherwise used for commercial purposes. The above information is an  only. It is not intended as medical advice for individual conditions or treatments. Talk to your doctor, nurse or pharmacist before following any medical regimen to see if it is safe and effective for you.

## 2023-12-21 ENCOUNTER — APPOINTMENT (OUTPATIENT)
Dept: LAB | Age: 53
End: 2023-12-21
Payer: COMMERCIAL

## 2023-12-21 ENCOUNTER — AMB VIDEO VISIT (OUTPATIENT)
Dept: OTHER | Facility: HOSPITAL | Age: 53
End: 2023-12-21
Payer: COMMERCIAL

## 2023-12-21 VITALS — OXYGEN SATURATION: 99 % | SYSTOLIC BLOOD PRESSURE: 110 MMHG | DIASTOLIC BLOOD PRESSURE: 80 MMHG

## 2023-12-21 DIAGNOSIS — N39.0 URINARY TRACT INFECTION WITHOUT HEMATURIA, SITE UNSPECIFIED: Primary | ICD-10-CM

## 2023-12-21 DIAGNOSIS — N39.0 URINARY TRACT INFECTION WITHOUT HEMATURIA, SITE UNSPECIFIED: ICD-10-CM

## 2023-12-21 PROCEDURE — 87086 URINE CULTURE/COLONY COUNT: CPT

## 2023-12-21 PROCEDURE — 99212 OFFICE O/P EST SF 10 MIN: CPT | Performed by: NURSE PRACTITIONER

## 2023-12-21 RX ORDER — NITROFURANTOIN 25; 75 MG/1; MG/1
100 CAPSULE ORAL 2 TIMES DAILY
Qty: 10 CAPSULE | Refills: 0 | Status: SHIPPED | OUTPATIENT
Start: 2023-12-21 | End: 2023-12-26

## 2023-12-21 NOTE — PATIENT INSTRUCTIONS
Urine culture as ordered Start antibiotic. Take probiotic.  Increase oral fluids.  Tylenol or Motrin for pain or fever.  Azo for bladder spasms.  Follow up with PCP if no improvement.  Go to ER with abd pain, flank pain or worsening symptoms.       Urinary Tract Infection in Women   WHAT YOU NEED TO KNOW:   A urinary tract infection (UTI) is caused by bacteria that get inside your urinary tract. Most bacteria that enter your urinary tract come out when you urinate. If the bacteria stay in your urinary tract, you may get an infection. Your urinary tract includes your kidneys, ureters, bladder, and urethra. Urine is made in your kidneys, and it flows from the ureters to the bladder. Urine leaves the bladder through the urethra. A UTI is more common in your lower urinary tract, which includes your bladder and urethra.        DISCHARGE INSTRUCTIONS:   Return to the emergency department if:   You are urinating very little or not at all.    You have a high fever with shaking chills.     You have side or back pain that gets worse.  Contact your healthcare provider if:   You have a fever.    You do not feel better after 2 days of taking antibiotics.    You are vomiting.     You have questions or concerns about your condition or care.  Medicines:   Antibiotics  help fight a bacterial infection.     Medicines  may be given to decrease pain and burning when you urinate. They will also help decrease the feeling that you need to urinate often. These medicines will make your urine orange or red.    Take your medicine as directed.  Contact your healthcare provider if you think your medicine is not helping or if you have side effects. Tell him or her if you are allergic to any medicine. Keep a list of the medicines, vitamins, and herbs you take. Include the amounts, and when and why you take them. Bring the list or the pill bottles to follow-up visits. Carry your medicine list with you in case of an emergency.  Follow up with your  healthcare provider as directed:  Write down your questions so you remember to ask them during your visits.   Prevent another UTI:   Empty your bladder often.  Urinate and empty your bladder as soon as you feel the need. Do not hold your urine for long periods of time.    Wipe from front to back after you urinate or have a bowel movement.  This will help prevent germs from getting into your urinary tract through your urethra.    Drink liquids as directed.  Ask how much liquid to drink each day and which liquids are best for you. You may need to drink more liquids than usual to help flush out the bacteria. Do not drink alcohol, caffeine, or citrus juices. These can irritate your bladder and increase your symptoms. Your healthcare provider may recommend cranberry juice to help prevent a UTI.    Urinate after you have sex.  This can help flush out bacteria passed during sex.    Do not douche or use feminine deodorants.  These can change the chemical balance in your vagina.    Change sanitary pads or tampons often.  This will help prevent germs from getting into your urinary tract.     Do pelvic muscle exercises often.  Pelvic muscle exercises may help you start and stop urinating. Strong pelvic muscles may help you empty your bladder easier. Squeeze these muscles tightly for 5 seconds like you are trying to hold back urine. Then relax for 5 seconds. Gradually work up to squeezing for 10 seconds. Do 3 sets of 15 repetitions a day, or as directed.  © 2017 [employer] Information is for End User's use only and may not be sold, redistributed or otherwise used for commercial purposes. All illustrations and images included in CareNotes® are the copyrighted property of [employer] or [employer].  The above information is an  only. It is not intended as medical advice for individual conditions or treatments. Talk to your doctor, nurse or pharmacist before following any medical  regimen to see if it is safe and effective for you.

## 2023-12-21 NOTE — CARE ANYWHERE EVISITS
Visit Summary for SINCERE JONAS - Gender: Female - Date of Birth: 1970  Date: [id number] - Duration: 7 minutes  Patient: SINCERE JONAS  Provider: Anibal FLANNERY    Patient Contact Information  Address  [address]; [place] [address]  [phone]    Visit Topics  Bladder infection symptoms [Added By: Self - 2023-12-21]    Triage Questions   What is your current physical address in the event of a medical emergency? Answer []  Are you allergic to any medications? Answer []  Are you now or could you be pregnant? Answer []  Do you have any immune system compromise or chronic lung   disease? Answer []  Do you have any vulnerable family members in the home (infant, pregnant, cancer, elderly)? Answer []     Conversation Transcripts  [0A][0A] [Notification] You are connected with Anibal FLANNERY, Urgent Care Specialist.[0A][Notification] SINCERE JONAS is located in [place].[0A][Notification] SINCERE JONAS has shared health history...[0A]    Diagnosis  Urinary tract infection, site not specified    Procedures  Value: [id number] Code: CPT-4 UNLISTED E&M SERVICE    Medications Prescribed    No prescriptions ordered    Electronically signed by: Anibal Naranjo(NPI [id number])

## 2023-12-21 NOTE — PROGRESS NOTES
Required Documentation:  Encounter provider ALMA DELIA Willis    Provider located at [facility]  VIRTUAL CARE   [address] [id number]    Identify all parties in room with patient during virtual visit:  No one else    The patient was identified by name and date of birth. Leeanna Lacy was informed that this is a telemedicine visit and that the visit is being conducted through the [employer] platform. She agrees to proceed..  My office door was closed. No one else was in the room.  She acknowledged consent and understanding of privacy and security of the video platform. The patient has agreed to participate and understands they can discontinue the visit at any time.    Verification of patient location:    Patient is located at home in the following state in which I hold an active license [place]    Patient is aware this is a billable service.     Reason for visit is No chief complaint on file.       Subjective  This is a 53 year old female here today for video visit.  She states for the last several weeks she has been having bladder fullnes and pressure.  She states her ruine isstronger.  She did a azo test which came up positive for leukocytes.  No fever, body aches or chills.  She has some chronic low back pain no worse and some suprapubic pressure. No nauseas or vomiting.           Past Medical History:   Diagnosis Date    Abnormal weight gain     Anxiety     Arthralgia     Panic attack     Stomatitis     last assessed 7/22/13; resolved 5/4/16       Past Surgical History:   Procedure Laterality Date    CHOLECYSTECTOMY LAPAROSCOPIC          Allergies   Allergen Reactions    Codeine Tachycardia    Penicillins Hives    Prednisone Irritability and Hyperactivity    Sulfa Antibiotics Hives    Tetracycline        Review of Systems   Constitutional:  Negative for activity change, fatigue and fever.   Respiratory: Negative.     Cardiovascular: Negative.     Genitourinary:  Positive for dysuria, frequency and urgency.   Neurological: Negative.    Psychiatric/Behavioral: Negative.         Video Exam    Vitals:    12/21/23 1046   BP: 110/80   SpO2: 99%       Physical Exam  Constitutional:       General: She is not in acute distress.     Appearance: Normal appearance. She is not ill-appearing or toxic-appearing.   HENT:      Head: Normocephalic and atraumatic.   Eyes:      Conjunctiva/sclera: Conjunctivae normal.   Pulmonary:      Effort: Pulmonary effort is normal. No respiratory distress.   Skin:     Comments: No rash on head or neck.    Neurological:      Mental Status: She is alert and oriented to person, place, and time.   Psychiatric:         Mood and Affect: Mood normal.         Behavior: Behavior normal.         Thought Content: Thought content normal.         Judgment: Judgment normal.         Visit Time  Total Visit Duration: 6 minutes    Assessment/Plan:    Diagnoses and all orders for this visit:    Urinary tract infection without hematuria, site unspecified  -     nitrofurantoin (MACROBID) 100 mg capsule; Take 1 capsule (100 mg total) by mouth 2 (two) times a day for 5 days  -     Urine culture; Future        Patient Instructions   Urine culture as ordered Start antibiotic. Take probiotic.  Increase oral fluids.  Tylenol or Motrin for pain or fever.  Azo for bladder spasms.  Follow up with PCP if no improvement.  Go to ER with abd pain, flank pain or worsening symptoms.       Urinary Tract Infection in Women   WHAT YOU NEED TO KNOW:   A urinary tract infection (UTI) is caused by bacteria that get inside your urinary tract. Most bacteria that enter your urinary tract come out when you urinate. If the bacteria stay in your urinary tract, you may get an infection. Your urinary tract includes your kidneys, ureters, bladder, and urethra. Urine is made in your kidneys, and it flows from the ureters to the bladder. Urine leaves the bladder through the urethra. A  UTI is more common in your lower urinary tract, which includes your bladder and urethra.        DISCHARGE INSTRUCTIONS:   Return to the emergency department if:   You are urinating very little or not at all.    You have a high fever with shaking chills.     You have side or back pain that gets worse.  Contact your healthcare provider if:   You have a fever.    You do not feel better after 2 days of taking antibiotics.    You are vomiting.     You have questions or concerns about your condition or care.  Medicines:   Antibiotics  help fight a bacterial infection.     Medicines  may be given to decrease pain and burning when you urinate. They will also help decrease the feeling that you need to urinate often. These medicines will make your urine orange or red.    Take your medicine as directed.  Contact your healthcare provider if you think your medicine is not helping or if you have side effects. Tell him or her if you are allergic to any medicine. Keep a list of the medicines, vitamins, and herbs you take. Include the amounts, and when and why you take them. Bring the list or the pill bottles to follow-up visits. Carry your medicine list with you in case of an emergency.  Follow up with your healthcare provider as directed:  Write down your questions so you remember to ask them during your visits.   Prevent another UTI:   Empty your bladder often.  Urinate and empty your bladder as soon as you feel the need. Do not hold your urine for long periods of time.    Wipe from front to back after you urinate or have a bowel movement.  This will help prevent germs from getting into your urinary tract through your urethra.    Drink liquids as directed.  Ask how much liquid to drink each day and which liquids are best for you. You may need to drink more liquids than usual to help flush out the bacteria. Do not drink alcohol, caffeine, or citrus juices. These can irritate your bladder and increase your symptoms. Your healthcare  provider may recommend cranberry juice to help prevent a UTI.    Urinate after you have sex.  This can help flush out bacteria passed during sex.    Do not douche or use feminine deodorants.  These can change the chemical balance in your vagina.    Change sanitary pads or tampons often.  This will help prevent germs from getting into your urinary tract.     Do pelvic muscle exercises often.  Pelvic muscle exercises may help you start and stop urinating. Strong pelvic muscles may help you empty your bladder easier. Squeeze these muscles tightly for 5 seconds like you are trying to hold back urine. Then relax for 5 seconds. Gradually work up to squeezing for 10 seconds. Do 3 sets of 15 repetitions a day, or as directed.  © 2017 [employer] Information is for End User's use only and may not be sold, redistributed or otherwise used for commercial purposes. All illustrations and images included in CareNotes® are the copyrighted property of [employer]. or [employer].  The above information is an  only. It is not intended as medical advice for individual conditions or treatments. Talk to your doctor, nurse or pharmacist before following any medical regimen to see if it is safe and effective for you.

## 2023-12-23 LAB — BACTERIA UR CULT: NORMAL

## 2024-01-02 ENCOUNTER — TELEPHONE (OUTPATIENT)
Dept: OTHER | Facility: HOSPITAL | Age: 54
End: 2024-01-02

## 2024-01-02 DIAGNOSIS — N39.0 URINARY TRACT INFECTION WITHOUT HEMATURIA, SITE UNSPECIFIED: Primary | ICD-10-CM

## 2024-01-02 NOTE — TELEPHONE ENCOUNTER
Called and spoke with patient as she contacted my via Teams.  She continue to have symptoms.  Urine culture appears negative.  Declan repeat UA and recommend follow up with PCP or ob/gyn

## 2024-02-20 ENCOUNTER — CONSULT (OUTPATIENT)
Age: 54
End: 2024-02-20
Payer: COMMERCIAL

## 2024-02-20 VITALS
WEIGHT: 213.2 LBS | BODY MASS INDEX: 36.4 KG/M2 | HEIGHT: 64 IN | DIASTOLIC BLOOD PRESSURE: 104 MMHG | SYSTOLIC BLOOD PRESSURE: 152 MMHG

## 2024-02-20 DIAGNOSIS — N95.0 PMB (POSTMENOPAUSAL BLEEDING): Primary | ICD-10-CM

## 2024-02-20 DIAGNOSIS — N95.0 POST-MENOPAUSAL BLEEDING: ICD-10-CM

## 2024-02-20 PROCEDURE — 99203 OFFICE O/P NEW LOW 30 MIN: CPT | Performed by: OBSTETRICS & GYNECOLOGY

## 2024-02-20 NOTE — PROGRESS NOTES
Gynecology   Leeanna Lacy 53 y.o. female MRN: [id number]      Assessment/Plan     PMB (postmenopausal bleeding)    - US pelvis complete w transvaginal; Future  - plan EMB if endometrium thickened    HPI:  Leeanna Lacy is a 53 y.o. female who presents with 2 episodes of brown vaginal discharge.  First lasted 9 days, second lasted 3 days.  Some cramping on one side with discharge.  Both happened after stressors.  She is not on HRT.  She notes significant vaginal dryness.        Historical Information   Past Medical History:   Diagnosis Date    Abnormal weight gain     Anxiety     Arthralgia     Panic attack     Stomatitis     last assessed 13; resolved 16     Past Surgical History:   Procedure Laterality Date    CHOLECYSTECTOMY LAPAROSCOPIC      WISDOM TOOTH EXTRACTION       OB/GYN History:   OB History          2    Para   2    Term   2       0    AB   0    Living   2         SAB   0    IAB   0    Ectopic   0    Multiple   0    Live Births   2                 Family History   Problem Relation Age of Onset    Diabetes Mother     Breast cancer Mother 74    Asthma Mother     Hypertension Mother     Heart failure Father     Coronary artery disease Father     Diabetes Father             Other Father         pure hypercholesterolemia    No Known Problems Daughter     No Known Problems Daughter     Pancreatic cancer Paternal Grandmother     No Known Problems Maternal Aunt     No Known Problems Maternal Aunt     Breast cancer Paternal Aunt     Cancer Family     Mental illness Neg Hx     Substance Abuse Neg Hx      Social History   Social History     Substance and Sexual Activity   Alcohol Use Yes    Alcohol/week: 1.0 standard drink of alcohol    Types: 1 Glasses of wine per week    Comment: Social     Social History     Substance and Sexual Activity   Drug Use No     Social History     Tobacco Use   Smoking Status Never   Smokeless Tobacco Never     E-Cigarette/Vaping    E-Cigarette Use Never  "User      E-Cigarette/Vaping Substances    Nicotine No     THC No     CBD No     Flavoring No     Other No     Unknown No        Meds/Allergies   Current Outpatient Medications on File Prior to Visit   Medication Sig    calcium carbonate (OS-ERICKA) 600 MG tablet Take 1 tablet (600 mg total) by mouth 2 (two) times a day with meals    esomeprazole (NexIUM) 20 mg capsule Take 1 capsule by mouth daily    fluocinolone acetonide (DermOtic) 0.01 % otic oil Administer 5 drops into both ears 2 (two) times a day    multivitamin (THERAGRAN) TABS Take 1 tablet by mouth daily    sertraline (ZOLOFT) 50 mg tablet Take 1 tablet (50 mg total) by mouth daily    propranolol (INDERAL) 40 mg tablet Tale 1 tablet 1 hour prior to appointment for anxiety (Patient not taking: Reported on 2/20/2024)    ranitidine (ZANTAC) 75 MG tablet Take 1 tablet (75 mg total) by mouth 2 (two) times a day (Patient not taking: Reported on 2/20/2024)     No current facility-administered medications on file prior to visit.       Allergies   Allergen Reactions    Codeine Tachycardia    Penicillins Hives    Prednisone Irritability and Hyperactivity    Sulfa Antibiotics Hives    Tetracycline        Objective   Vitals: Blood pressure (!) 152/104, height 5' 3.5\" (1.613 m), weight 96.7 kg (213 lb 3.2 oz), last menstrual period 05/31/2022    Physical Exam  Constitutional:       Appearance: Normal appearance.   Genitourinary:      Vulva and urethral meatus normal.      Right Labia: No lesions.     Left Labia: No lesions.     No inguinal adenopathy present in the right or left side.     No vaginal discharge, erythema, bleeding or ulceration.      Mild vaginal atrophy present.       Right Adnexa: not tender, not full and no mass present.     Left Adnexa: not tender, not full and no mass present.     No cervical discharge, friability or lesion.      Uterus is not enlarged, fixed or tender.   HENT:      Head: Normocephalic.   Cardiovascular:      Rate and Rhythm: Normal " rate and regular rhythm.   Pulmonary:      Effort: Pulmonary effort is normal.   Abdominal:      General: There is no distension.      Palpations: Abdomen is soft.      Tenderness: There is no abdominal tenderness.   Musculoskeletal:         General: No swelling.   Lymphadenopathy:      Lower Body: No right inguinal adenopathy. No left inguinal adenopathy.   Neurological:      General: No focal deficit present.      Mental Status: She is alert and oriented to person, place, and time.   Skin:     General: Skin is warm and dry.   Psychiatric:         Mood and Affect: Mood normal.         Behavior: Behavior normal.   Vitals reviewed.

## 2024-03-07 ENCOUNTER — HOSPITAL ENCOUNTER (OUTPATIENT)
Dept: RADIOLOGY | Facility: MEDICAL CENTER | Age: 54
Discharge: HOME | End: 2024-03-07
Payer: COMMERCIAL

## 2024-03-07 DIAGNOSIS — N95.0 PMB (POSTMENOPAUSAL BLEEDING): ICD-10-CM

## 2024-03-07 PROCEDURE — 76830 TRANSVAGINAL US NON-OB: CPT

## 2024-03-07 PROCEDURE — 76856 US EXAM PELVIC COMPLETE: CPT

## 2024-04-05 ENCOUNTER — AMB VIDEO VISIT (OUTPATIENT)
Dept: OTHER | Facility: HOSPITAL | Age: 54
End: 2024-04-05
Payer: COMMERCIAL

## 2024-04-05 DIAGNOSIS — H10.9 CONJUNCTIVITIS OF BOTH EYES, UNSPECIFIED CONJUNCTIVITIS TYPE: Primary | ICD-10-CM

## 2024-04-05 DIAGNOSIS — H10.33 ACUTE BACTERIAL CONJUNCTIVITIS OF BOTH EYES: Primary | ICD-10-CM

## 2024-04-05 DIAGNOSIS — J01.00 ACUTE NON-RECURRENT MAXILLARY SINUSITIS: ICD-10-CM

## 2024-04-05 PROCEDURE — 99212 OFFICE O/P EST SF 10 MIN: CPT | Performed by: PHYSICIAN ASSISTANT

## 2024-04-05 RX ORDER — OFLOXACIN 3 MG/ML
1 SOLUTION/ DROPS OPHTHALMIC 4 TIMES DAILY
Qty: 5 ML | Refills: 0 | Status: SHIPPED | OUTPATIENT
Start: 2024-04-05

## 2024-04-05 RX ORDER — TOBRAMYCIN 3 MG/ML
1 SOLUTION/ DROPS OPHTHALMIC
Qty: 5 ML | Refills: 0 | Status: SHIPPED | OUTPATIENT
Start: 2024-04-05

## 2024-04-05 RX ORDER — AZITHROMYCIN 250 MG/1
TABLET, FILM COATED ORAL
Qty: 6 TABLET | Refills: 0 | Status: SHIPPED | OUTPATIENT
Start: 2024-04-05 | End: 2024-04-10

## 2024-04-05 NOTE — PROGRESS NOTES
Required Documentation:  Encounter provider Tonya Mccormick PA-C    Provider located at [facility]  VIRTUAL CARE   [address] [id number]    Identify all parties in room with patient during virtual visit:  No one else    The patient was identified by name and date of birth. Leeanna Lacy was informed that this is a telemedicine visit and that the visit is being conducted through the [email or web address] platform. She agrees to proceed..  My office door was closed. No one else was in the room.  She acknowledged consent and understanding of privacy and security of the video platform. The patient has agreed to participate and understands they can discontinue the visit at any time.    Verification of patient location:    Patient is located at Home in the following state in which I hold an active license [place]    Patient is aware this is a billable service.     Reason for visit is No chief complaint on file.       Subjective  HPI   Patient states she has sinus pain and pressure, bodyaches, conjunctivitis.  She has a dry cough.  She woke up with her right eye with discharge and itchy.  Her left eye started today.  It started a few days ago.  She has tried advil which helps with the bodyaches.  She had a negative covid test. She thinks she has had a low grade fever.  She denies SOB, CP.      Past Medical History:   Diagnosis Date    Abnormal weight gain     Anxiety     Arthralgia     Panic attack     Stomatitis     last assessed 7/22/13; resolved 5/4/16       Past Surgical History:   Procedure Laterality Date    CHOLECYSTECTOMY LAPAROSCOPIC      WISDOM TOOTH EXTRACTION          Allergies   Allergen Reactions    Codeine Tachycardia    Penicillins Hives    Prednisone Irritability and Hyperactivity    Sulfa Antibiotics Hives    Tetracycline        Review of Systems   Constitutional:  Positive for chills and fever.   HENT:  Positive for congestion, sinus pressure and  sinus pain.    Eyes:  Positive for pain, discharge, redness and itching.   Respiratory:  Positive for cough. Negative for shortness of breath.    Gastrointestinal: Negative.    Musculoskeletal:  Positive for arthralgias.   Neurological:  Positive for headaches.   Psychiatric/Behavioral: Negative.         Video Exam    There were no vitals filed for this visit.    Physical Exam  Constitutional:       General: She is not in acute distress.     Appearance: Normal appearance. She is not ill-appearing, toxic-appearing or diaphoretic.   HENT:      Head: Normocephalic and atraumatic.      Nose: Congestion present.      Comments: TTP over sinuses  Pulmonary:      Effort: Pulmonary effort is normal. No respiratory distress.   Lymphadenopathy:      Cervical: Cervical adenopathy present.   Skin:     General: Skin is dry.   Neurological:      General: No focal deficit present.      Mental Status: She is alert and oriented to person, place, and time.   Psychiatric:         Mood and Affect: Mood normal.         Behavior: Behavior normal.         Visit Time  Total Visit Duration: 5 minutes    Assessment/Plan:    Diagnoses and all orders for this visit:    Acute bacterial conjunctivitis of both eyes  -     ofloxacin (OCUFLOX) 0.3 % ophthalmic solution; Administer 1 drop to both eyes 4 (four) times a day    Acute non-recurrent maxillary sinusitis  -     azithromycin (ZITHROMAX) 250 mg tablet; Take 2 tablets today then 1 tablet daily x 4 days        Patient Instructions   Conjunctivitis   WHAT YOU NEED TO KNOW:   Conjunctivitis, or pink eye, is inflammation of your conjunctiva. The conjunctiva is a thin tissue that covers the front of your eye and the back of your eyelid. The conjunctiva helps protect your eye and keep it moist. The most common cause of conjunctivitis is infection with bacteria or a virus. Allergies or exposure to a chemical may also cause conjunctivitis. Conjunctivitis is easily spread from person to person.        DISCHARGE INSTRUCTIONS:   Return to the emergency department if:   You have worsening eye pain.    The swelling in your eye gets worse, even after treatment.    Your vision suddenly becomes worse, or you cannot see at all.    Call your doctor if:   Your start to notice changes in your vision.    You develop a fever and ear pain.    You have tiny bumps or spots of blood on your eye.    You have questions or concerns about your condition or care.    Medicines:  You may need any of the following:  Allergy medicine  helps decrease itchy, red, swollen eyes caused by allergies. It may be given as a pill, eye drops, or nasal spray.    Antibiotics  may be needed if your conjunctivitis is caused by bacteria. This medicine may be given as a pill, eye drops, or eye ointment.    Take your medicine as directed.  Contact your healthcare provider if you think your medicine is not helping or if you have side effects. Tell your provider if you are allergic to any medicine. Keep a list of the medicines, vitamins, and herbs you take. Include the amounts, and when and why you take them. Bring the list or the pill bottles to follow-up visits. Carry your medicine list with you in case of an emergency.    Manage your symptoms:   Apply a cool compress.  Wet a washcloth with cold water and place it on your eye. This will help decrease itching and irritation.    Use artificial tears.  This will help lessen your symptoms, including itching or irritation.    Do not wear contact lenses  until treatment is complete and your symptoms are gone.    Flush your eye.  You may need to flush your eye with saline to help decrease your symptoms. Ask for more information on how to flush your eye.    Prevent the spread of conjunctivitis:   Wash your hands with soap and water often.  Wash your hands before and after you touch your eyes. Wash your hands after you use the bathroom, change a child's diaper, or sneeze. Wash your hands before you prepare or  eat food.         Avoid contact with others.  Do not share towels or washcloths. Try to stay away from others as much as possible. Ask when you can return to work or school.    Avoid allergens and irritants.  Try to avoid the things that cause your allergies, such as pets, dust, or grass. Stay away from smoke filled areas. Shield your eyes from wind and sun.    Throw away eye makeup.  Bacteria can stay in eye makeup. Throw away your current mascara and other eye makeup. Never share mascara or other eye makeup with anyone.    Follow up with your doctor as directed:  You may be referred to an ophthalmologist for treatment. Write down your questions so you remember to ask them during your visits.  © Copyright [employer] 2023 Information is for End User's use only and may not be sold, redistributed or otherwise used for commercial purposes.  The above information is an  only. It is not intended as medical advice for individual conditions or treatments. Talk to your doctor, nurse or pharmacist before following any medical regimen to see if it is safe and effective for you.  Sinusitis   WHAT YOU NEED TO KNOW:   Sinusitis is inflammation or infection of your sinuses. Sinusitis is most often caused by a virus. Acute sinusitis may last up to 12 weeks. Chronic sinusitis lasts longer than 12 weeks. Recurrent sinusitis means you have 4 or more infections in 1 year.        DISCHARGE INSTRUCTIONS:   Return to the emergency department if:   You have trouble breathing or wheezing that is getting worse.    You have a stiff neck, a fever, or a bad headache.     You cannot open your eye.     Your eyeball bulges out or you cannot move your eye.     You are more sleepy than normal, or you notice changes in your ability to think, move, or talk.    You have swelling of your forehead or scalp.    Call your doctor if:   You have vision changes, such as double vision.    Your eye and eyelid are red, swollen, and painful.      Your symptoms do not improve or go away after 10 days.    You have nausea and are vomiting.    Your nose is bleeding.    You have questions or concerns about your condition or care.    Medicines:  Your symptoms may go away on their own. Your healthcare provider may recommend watchful waiting for up to 10 days before starting antibiotics. You may need any of the following:  Acetaminophen  decreases pain and fever. It is available without a doctor's order. Ask how much to take and how often to take it. Follow directions. Read the labels of all other medicines you are using to see if they also contain acetaminophen, or ask your doctor or pharmacist. Acetaminophen can cause liver damage if not taken correctly.    NSAIDs , such as ibuprofen, help decrease swelling, pain, and fever. This medicine is available with or without a doctor's order. NSAIDs can cause stomach bleeding or kidney problems in certain people. If you take blood thinner medicine, always ask your healthcare provider if NSAIDs are safe for you. Always read the medicine label and follow directions.    Nasal steroid sprays  may help decrease inflammation in your nose and sinuses.    Decongestants  help reduce swelling and drain mucus in the nose and sinuses. They may help you breathe easier.     Antihistamines  help dry mucus in the nose and relieve sneezing.     Antibiotics  help treat or prevent a bacterial infection.    Take your medicine as directed.  Contact your healthcare provider if you think your medicine is not helping or if you have side effects. Tell your provider if you are allergic to any medicine. Keep a list of the medicines, vitamins, and herbs you take. Include the amounts, and when and why you take them. Bring the list or the pill bottles to follow-up visits. Carry your medicine list with you in case of an emergency.    Self-care:   Rinse your sinuses as directed.  Use a sinus rinse device to rinse your nasal passages with a saline  (salt water) solution or distilled water. Do not use tap water. This will help thin the mucus in your nose and rinse away pollen and dirt. It will also help reduce swelling so you can breathe normally.    Use a humidifier  to increase air moisture in your home. This may make it easier for you to breathe and help decrease your cough.     Sleep with your head elevated.  Place an extra pillow under your head before you go to sleep to help your sinuses drain.     Drink liquids as directed.  Ask your healthcare provider how much liquid to drink each day and which liquids are best for you. Liquids will thin the mucus in your nose and help it drain. Avoid drinks that contain alcohol or caffeine.     Do not smoke, and avoid secondhand smoke.  Nicotine and other chemicals in cigarettes and cigars can make your symptoms worse. Ask your healthcare provider for information if you currently smoke and need help to quit. E-cigarettes or smokeless tobacco still contain nicotine. Talk to your healthcare provider before you use these products.    Prevent the spread of germs:   Wash your hands often with soap and water.  Wash your hands after you use the bathroom, change a child's diaper, or sneeze. Wash your hands before you prepare or eat food.         Stay away from people who are sick.  Some germs spread easily and quickly through contact.    Follow up with your doctor as directed:  You may be referred to an ear, nose, and throat specialist. Write down your questions so you remember to ask them during your visits.   © Copyright [employer] 2023 Information is for End User's use only and may not be sold, redistributed or otherwise used for commercial purposes.  The above information is an  only. It is not intended as medical advice for individual conditions or treatments. Talk to your doctor, nurse or pharmacist before following any medical regimen to see if it is safe and effective for you.

## 2024-04-05 NOTE — PATIENT INSTRUCTIONS
Conjunctivitis   WHAT YOU NEED TO KNOW:   Conjunctivitis, or pink eye, is inflammation of your conjunctiva. The conjunctiva is a thin tissue that covers the front of your eye and the back of your eyelid. The conjunctiva helps protect your eye and keep it moist. The most common cause of conjunctivitis is infection with bacteria or a virus. Allergies or exposure to a chemical may also cause conjunctivitis. Conjunctivitis is easily spread from person to person.       DISCHARGE INSTRUCTIONS:   Return to the emergency department if:   You have worsening eye pain.    The swelling in your eye gets worse, even after treatment.    Your vision suddenly becomes worse, or you cannot see at all.    Call your doctor if:   Your start to notice changes in your vision.    You develop a fever and ear pain.    You have tiny bumps or spots of blood on your eye.    You have questions or concerns about your condition or care.    Medicines:  You may need any of the following:  Allergy medicine  helps decrease itchy, red, swollen eyes caused by allergies. It may be given as a pill, eye drops, or nasal spray.    Antibiotics  may be needed if your conjunctivitis is caused by bacteria. This medicine may be given as a pill, eye drops, or eye ointment.    Take your medicine as directed.  Contact your healthcare provider if you think your medicine is not helping or if you have side effects. Tell your provider if you are allergic to any medicine. Keep a list of the medicines, vitamins, and herbs you take. Include the amounts, and when and why you take them. Bring the list or the pill bottles to follow-up visits. Carry your medicine list with you in case of an emergency.    Manage your symptoms:   Apply a cool compress.  Wet a washcloth with cold water and place it on your eye. This will help decrease itching and irritation.    Use artificial tears.  This will help lessen your symptoms, including itching or irritation.    Do not wear contact  lenses  until treatment is complete and your symptoms are gone.    Flush your eye.  You may need to flush your eye with saline to help decrease your symptoms. Ask for more information on how to flush your eye.    Prevent the spread of conjunctivitis:   Wash your hands with soap and water often.  Wash your hands before and after you touch your eyes. Wash your hands after you use the bathroom, change a child's diaper, or sneeze. Wash your hands before you prepare or eat food.         Avoid contact with others.  Do not share towels or washcloths. Try to stay away from others as much as possible. Ask when you can return to work or school.    Avoid allergens and irritants.  Try to avoid the things that cause your allergies, such as pets, dust, or grass. Stay away from smoke filled areas. Shield your eyes from wind and sun.    Throw away eye makeup.  Bacteria can stay in eye makeup. Throw away your current mascara and other eye makeup. Never share mascara or other eye makeup with anyone.    Follow up with your doctor as directed:  You may be referred to an ophthalmologist for treatment. Write down your questions so you remember to ask them during your visits.  © Copyright [employer] 2023 Information is for End User's use only and may not be sold, redistributed or otherwise used for commercial purposes.  The above information is an  only. It is not intended as medical advice for individual conditions or treatments. Talk to your doctor, nurse or pharmacist before following any medical regimen to see if it is safe and effective for you.  Sinusitis   WHAT YOU NEED TO KNOW:   Sinusitis is inflammation or infection of your sinuses. Sinusitis is most often caused by a virus. Acute sinusitis may last up to 12 weeks. Chronic sinusitis lasts longer than 12 weeks. Recurrent sinusitis means you have 4 or more infections in 1 year.        DISCHARGE INSTRUCTIONS:   Return to the emergency department if:   You have trouble  breathing or wheezing that is getting worse.    You have a stiff neck, a fever, or a bad headache.     You cannot open your eye.     Your eyeball bulges out or you cannot move your eye.     You are more sleepy than normal, or you notice changes in your ability to think, move, or talk.    You have swelling of your forehead or scalp.    Call your doctor if:   You have vision changes, such as double vision.    Your eye and eyelid are red, swollen, and painful.     Your symptoms do not improve or go away after 10 days.    You have nausea and are vomiting.    Your nose is bleeding.    You have questions or concerns about your condition or care.    Medicines:  Your symptoms may go away on their own. Your healthcare provider may recommend watchful waiting for up to 10 days before starting antibiotics. You may need any of the following:  Acetaminophen  decreases pain and fever. It is available without a doctor's order. Ask how much to take and how often to take it. Follow directions. Read the labels of all other medicines you are using to see if they also contain acetaminophen, or ask your doctor or pharmacist. Acetaminophen can cause liver damage if not taken correctly.    NSAIDs , such as ibuprofen, help decrease swelling, pain, and fever. This medicine is available with or without a doctor's order. NSAIDs can cause stomach bleeding or kidney problems in certain people. If you take blood thinner medicine, always ask your healthcare provider if NSAIDs are safe for you. Always read the medicine label and follow directions.    Nasal steroid sprays  may help decrease inflammation in your nose and sinuses.    Decongestants  help reduce swelling and drain mucus in the nose and sinuses. They may help you breathe easier.     Antihistamines  help dry mucus in the nose and relieve sneezing.     Antibiotics  help treat or prevent a bacterial infection.    Take your medicine as directed.  Contact your healthcare provider if you think  your medicine is not helping or if you have side effects. Tell your provider if you are allergic to any medicine. Keep a list of the medicines, vitamins, and herbs you take. Include the amounts, and when and why you take them. Bring the list or the pill bottles to follow-up visits. Carry your medicine list with you in case of an emergency.    Self-care:   Rinse your sinuses as directed.  Use a sinus rinse device to rinse your nasal passages with a saline (salt water) solution or distilled water. Do not use tap water. This will help thin the mucus in your nose and rinse away pollen and dirt. It will also help reduce swelling so you can breathe normally.    Use a humidifier  to increase air moisture in your home. This may make it easier for you to breathe and help decrease your cough.     Sleep with your head elevated.  Place an extra pillow under your head before you go to sleep to help your sinuses drain.     Drink liquids as directed.  Ask your healthcare provider how much liquid to drink each day and which liquids are best for you. Liquids will thin the mucus in your nose and help it drain. Avoid drinks that contain alcohol or caffeine.     Do not smoke, and avoid secondhand smoke.  Nicotine and other chemicals in cigarettes and cigars can make your symptoms worse. Ask your healthcare provider for information if you currently smoke and need help to quit. E-cigarettes or smokeless tobacco still contain nicotine. Talk to your healthcare provider before you use these products.    Prevent the spread of germs:   Wash your hands often with soap and water.  Wash your hands after you use the bathroom, change a child's diaper, or sneeze. Wash your hands before you prepare or eat food.         Stay away from people who are sick.  Some germs spread easily and quickly through contact.    Follow up with your doctor as directed:  You may be referred to an ear, nose, and throat specialist. Write down your questions so you  remember to ask them during your visits.   © Copyright [employer] 2023 Information is for End User's use only and may not be sold, redistributed or otherwise used for commercial purposes.  The above information is an  only. It is not intended as medical advice for individual conditions or treatments. Talk to your doctor, nurse or pharmacist before following any medical regimen to see if it is safe and effective for you.

## 2024-04-05 NOTE — CARE ANYWHERE EVISITS
Visit Summary for SINCERE JONAS - Gender: Female - Date of Birth: 1970  Date: [id number] - Duration: 6 minutes  Patient: SINCERE JONAS  Provider: Tonya Mccormick PA-C    Patient Contact Information  Address  [address]; [place] [address]  [phone]    Visit Topics    Triage Questions   What is your current physical address in the event of a medical emergency? Answer []  Are you allergic to any medications? Answer []  Are you now or could you be pregnant? Answer []  Do you have any immune system compromise or chronic lung   disease? Answer []  Do you have any vulnerable family members in the home (infant, pregnant, cancer, elderly)? Answer []     Conversation Transcripts  [0A][0A] [Notification] You are connected with Tonya Mccormick PA-C, Urgent Care Specialist.[0A][Notification] SINCERE JONAS is located in [place].[0A][Notification] SINCERE JONAS has shared health history...[0A]    Diagnosis  Unspecified acute conjunctivitis, bilateral  Acute maxillary sinusitis, unspecified    Procedures  Value: [id number] Code: CPT-4 UNLISTED E&M SERVICE    Medications Prescribed    No prescriptions ordered    Electronically signed by: Tonya Mccormick PA-C(NPI [id number])

## 2024-04-18 ENCOUNTER — TELEMEDICINE (OUTPATIENT)
Dept: FAMILY MEDICINE CLINIC | Facility: CLINIC | Age: 54
End: 2024-04-18
Payer: COMMERCIAL

## 2024-04-18 DIAGNOSIS — U07.1 COVID-19: Primary | ICD-10-CM

## 2024-04-18 PROCEDURE — 99213 OFFICE O/P EST LOW 20 MIN: CPT | Performed by: PHYSICIAN ASSISTANT

## 2024-04-18 NOTE — PROGRESS NOTES
COVID-19 Outpatient Progress Note    Assessment/Plan:    Covid infection - reassurance, mild, fluids, rest. Discussed symptomatic relief and quarantine.    F/u as needed       Disposition:     Discussed symptom directed medication options with patient. Discussed vitamin D, vitamin C, and/or zinc supplementation with patient.     I have spent a total time of 15 minutes on the day of the encounter for this patient including risks and benefits of treatment options, instructions for management, importance of treatment compliance, risk factor reductions, impressions and counseling/coordination of care.       Encounter provider: Devora Bundy PA-C     Provider located at: [facility]  [address]  [address] [address] [phone]     Recent Visits  No visits were found meeting these conditions.  Showing recent visits within past 7 days and meeting all other requirements  Today's Visits  Date Type Provider Dept   04/18/24 Telemedicine Devora Bundy PA-C [facility]   Showing today's visits and meeting all other requirements  Future Appointments  No visits were found meeting these conditions.  Showing future appointments within next 150 days and meeting all other requirements     This virtual check-in was done via [employer] and patient was informed that this is a secure, HIPAA-compliant platform. She agrees to proceed.    Patient agrees to participate in a virtual check in via telephone or video visit instead of presenting to the office to address urgent/immediate medical needs. Patient is aware this is a billable service. She acknowledged consent and understanding of privacy and security of the video platform. The patient has agreed to participate and understands they can discontinue the visit at any time.    After connecting through [employer], the patient was identified by name and date of  "birth. Leeanna Lacy was informed that this was a telemedicine visit and that the exam was being conducted confidentially over secure lines. My office door was closed. No one else was in the room. Leeanna Lacy acknowledged consent and understanding of privacy and security of the telemedicine visit. I informed the patient that I have reviewed her record in Epic and presented the opportunity for her to ask any questions regarding the visit today. The patient agreed to participate.     Verification of patient location:  Patient is located in the following state in which I hold an active license: [place]    Subjective:   Leeanna Lacy is a 53 y.o. female who has been screened for COVID-19. Symptom change since last report: unchanged. Patient's symptoms include fever, chills, fatigue, malaise, nasal congestion, rhinorrhea, cough, myalgias and headache. Patient denies sore throat, anosmia, loss of taste, shortness of breath, chest tightness, abdominal pain, nausea, vomiting and diarrhea.     - Date of symptom onset: 4/15/2024  - Date of positive COVID-19 test: 4/17/2024. Type of test: Home antigen. Patient with typical symptoms of COVID-19 and they attest that they were positive on home rapid antigen testing. Image of positive result is not able to be uploaded into their chart.     COVID-19 vaccination status: Fully vaccinated with booster    Leeanna has been staying home and has isolated themselves in her home. She is taking care to not share personal items and is cleaning all surfaces that are touched often, like counters, tabletops, and doorknobs using household cleaning sprays or wipes. She is wearing a mask when she leaves her room.     No results found for: \"SARSCOV2\", \"[id number]\", \"SARSCORONAVI\", \"CORONAVIRUSR\", \"SARSCOVAG\", \"SARSCOVAGH\"    Review of Systems   Constitutional:  Positive for chills, fatigue and fever.   HENT:  Positive for congestion and rhinorrhea. Negative for sore throat.    Respiratory:  Positive " for cough. Negative for chest tightness and shortness of breath.    Gastrointestinal:  Negative for abdominal pain, diarrhea, nausea and vomiting.   Musculoskeletal:  Positive for myalgias.   Neurological:  Positive for headaches.     Current Outpatient Medications on File Prior to Visit   Medication Sig    calcium carbonate (OS-ERICKA) 600 MG tablet Take 1 tablet (600 mg total) by mouth 2 (two) times a day with meals    esomeprazole (NexIUM) 20 mg capsule Take 1 capsule by mouth daily    fluocinolone acetonide (DermOtic) 0.01 % otic oil Administer 5 drops into both ears 2 (two) times a day    multivitamin (THERAGRAN) TABS Take 1 tablet by mouth daily    ofloxacin (OCUFLOX) 0.3 % ophthalmic solution Administer 1 drop to both eyes 4 (four) times a day    sertraline (ZOLOFT) 50 mg tablet Take 1 tablet (50 mg total) by mouth daily    [DISCONTINUED] propranolol (INDERAL) 40 mg tablet Tale 1 tablet 1 hour prior to appointment for anxiety (Patient not taking: Reported on 2/20/2024)    [DISCONTINUED] ranitidine (ZANTAC) 75 MG tablet Take 1 tablet (75 mg total) by mouth 2 (two) times a day (Patient not taking: Reported on 2/20/2024)    [DISCONTINUED] tobramycin (TOBREX) 0.3 % SOLN Administer 1 drop to both eyes every 4 (four) hours while awake       Objective:    [facility] 05/31/2022 (Exact Date)        Physical Exam  Constitutional:       General: She is not in acute distress.     Appearance: Normal appearance. She is ill-appearing. She is not toxic-appearing.   HENT:      Head: Normocephalic and atraumatic.   Pulmonary:      Effort: Pulmonary effort is normal. No respiratory distress.   Neurological:      General: No focal deficit present.      Mental Status: She is alert.   Psychiatric:         Mood and Affect: Mood normal.         Behavior: Behavior normal.         Thought Content: Thought content normal.         Judgment: Judgment normal.       Devora Bundy PA-C

## 2024-04-18 NOTE — LETTER
April 18, 2024    Patient: Leeanna Lacy  YOB: 1970  Date of Last Encounter: 11/20/2023      To whom it may concern:     Leeanna Lacy has tested positive for COVID-19 (Coronavirus). She may return to work on 4/22/2024, which is 5 days from illness onset (provided symptoms are improving) and 24 hours without fever.    Sincerely,         Devora Bundy PA-C

## 2024-04-29 ENCOUNTER — HOSPITAL ENCOUNTER (OUTPATIENT)
Dept: MAMMOGRAPHY | Facility: CLINIC | Age: 54
Discharge: HOME | End: 2024-04-29
Payer: COMMERCIAL

## 2024-04-29 VITALS — BODY MASS INDEX: 36.37 KG/M2 | HEIGHT: 64 IN | WEIGHT: 213 LBS

## 2024-04-29 DIAGNOSIS — Z12.31 ENCOUNTER FOR SCREENING MAMMOGRAM FOR MALIGNANT NEOPLASM OF BREAST: ICD-10-CM

## 2024-04-29 PROCEDURE — 77067 SCR MAMMO BI INCL CAD: CPT

## 2024-04-29 PROCEDURE — 77063 BREAST TOMOSYNTHESIS BI: CPT

## 2024-05-07 ENCOUNTER — OFFICE VISIT (OUTPATIENT)
Dept: FAMILY MEDICINE CLINIC | Facility: CLINIC | Age: 54
End: 2024-05-07
Payer: COMMERCIAL

## 2024-05-07 VITALS — HEART RATE: 108 BPM | OXYGEN SATURATION: 97 % | TEMPERATURE: 97.8 F | BODY MASS INDEX: 37.74 KG/M2 | WEIGHT: 216.5 LBS

## 2024-05-07 DIAGNOSIS — S80.869A TICK BITE OF LOWER LEG, UNSPECIFIED LATERALITY, INITIAL ENCOUNTER: Primary | ICD-10-CM
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
## 2024-05-07 PROCEDURE — 99213 OFFICE O/P EST LOW 20 MIN: CPT | Performed by: PHYSICIAN ASSISTANT

## 2024-05-07 RX ORDER — DOXYCYCLINE HYCLATE 100 MG/1
200 CAPSULE ORAL ONCE
Qty: 2 CAPSULE | Refills: 0 | Status: SHIPPED | OUTPATIENT
Start: 2024-05-07 | End: 2024-05-07

## 2024-05-07 NOTE — PROGRESS NOTES
Assessment/Plan:    Tick bite leg - removed today, treat with doxy 100 mg 2 tablets now with food, check lyme titer in 4 weeks. Call for concerns    F/u as needed    Subjective:   Chief Complaint   Patient presents with    Tick Bite     Left leg      Patient ID: Leeanna Lacy is a 54 y.o. female.    Patient felt a pinch while working today, looked at her leg and saw a tick.  removed it. Here for evaluation.        The following portions of the patient's history were reviewed and updated as appropriate: allergies, current medications, past family history, past medical history, past social history, past surgical history, and problem list.    Past Medical History:   Diagnosis Date    Abnormal weight gain     Allergic Child    Anxiety     Arthralgia     GERD (gastroesophageal reflux disease)     Panic attack     Stomatitis     last assessed 13; resolved 16     Past Surgical History:   Procedure Laterality Date    CHOLECYSTECTOMY      CHOLECYSTECTOMY LAPAROSCOPIC      WISDOM TOOTH EXTRACTION       Family History   Problem Relation Age of Onset    Diabetes Mother     Breast cancer Mother 74    Asthma Mother     Hypertension Mother     Heart failure Father     Coronary artery disease Father     Diabetes Father             Other Father         pure hypercholesterolemia    No Known Problems Daughter     No Known Problems Daughter     Pancreatic cancer Paternal Grandmother     No Known Problems Maternal Aunt     No Known Problems Maternal Aunt     Breast cancer Paternal Aunt     Cancer Family     Mental illness Neg Hx     Substance Abuse Neg Hx     Colon cancer Neg Hx     Ovarian cancer Neg Hx     Endometrial cancer Neg Hx      Social History     Socioeconomic History    Marital status: /Civil Union     Spouse name: Not on file    Number of children: Not on file    Years of education: Not on file    Highest education level: Not on file   Occupational History    Not on file   Tobacco Use     Smoking status: Never    Smokeless tobacco: Never   Vaping Use    Vaping status: Never Used   Substance and Sexual Activity    Alcohol use: Yes     Alcohol/week: 1.0 standard drink of alcohol     Types: 1 Glasses of wine per week     Comment: Social    Drug use: No    Sexual activity: Yes     Partners: Male     Birth control/protection: Male Sterilization   Other Topics Concern    Not on file   Social History Narrative    Caffeine use - Nominal     Uses safety equipment - smoke detectors     Uses seat belts      Social Determinants of Health     Financial Resource Strain: Not on file   Food Insecurity: Not on file   Transportation Needs: Not on file   Physical Activity: Not on file   Stress: Not on file   Social Connections: Not on file   Intimate Partner Violence: Not on file   Housing Stability: Not on file       Current Outpatient Medications:     calcium carbonate (OS-ERICKA) 600 MG tablet, Take 1 tablet (600 mg total) by mouth 2 (two) times a day with meals, Disp: , Rfl:     doxycycline hyclate (VIBRAMYCIN) 100 mg capsule, Take 2 capsules (200 mg total) by mouth 1 (one) time for 1 dose, Disp: 2 capsule, Rfl: 0    esomeprazole (NexIUM) 20 mg capsule, Take 1 capsule by mouth daily, Disp: , Rfl:     fluocinolone acetonide (DermOtic) 0.01 % otic oil, Administer 5 drops into both ears 2 (two) times a day, Disp: 20 mL, Rfl: 0    multivitamin (THERAGRAN) TABS, Take 1 tablet by mouth daily, Disp: , Rfl:     sertraline (ZOLOFT) 50 mg tablet, Take 1 tablet (50 mg total) by mouth daily, Disp: 90 tablet, Rfl: 3    ofloxacin (OCUFLOX) 0.3 % ophthalmic solution, Administer 1 drop to both eyes 4 (four) times a day, Disp: 5 mL, Rfl: 0    Review of Systems          Objective:    Vitals:    05/07/24 1805   Pulse: (!) 108   Temp: 97.8 °F (36.6 °C)   SpO2: 97%   Weight: 98.2 kg (216 lb 8 oz)     Refusing BP - anxious     Physical Exam  Constitutional:       Appearance: Normal appearance.   HENT:      Head: Normocephalic and  atraumatic.   Skin:     Comments: Posterior distal left thigh with 1 mm area of excoriation, no obvious foreign bodies   Neurological:      General: No focal deficit present.      Mental Status: She is alert and oriented to person, place, and time.   Psychiatric:         Mood and Affect: Mood normal.         Behavior: Behavior normal.         Thought Content: Thought content normal.         Judgment: Judgment normal.

## 2024-05-23 ENCOUNTER — OFFICE VISIT (OUTPATIENT)
Dept: DERMATOLOGY | Facility: CLINIC | Age: 54
End: 2024-05-23
Payer: COMMERCIAL

## 2024-05-23 ENCOUNTER — TELEPHONE (OUTPATIENT)
Dept: DERMATOLOGY | Facility: CLINIC | Age: 54
End: 2024-05-23

## 2024-05-23 ENCOUNTER — COSMETIC (OUTPATIENT)
Dept: DERMATOLOGY | Facility: CLINIC | Age: 54
End: 2024-05-23

## 2024-05-23 VITALS — WEIGHT: 218 LBS | HEIGHT: 64 IN | BODY MASS INDEX: 37.22 KG/M2 | TEMPERATURE: 99.2 F

## 2024-05-23 DIAGNOSIS — L81.4 LENTIGO: Primary | ICD-10-CM

## 2024-05-23 DIAGNOSIS — D22.9 MULTIPLE MELANOCYTIC NEVI: ICD-10-CM

## 2024-05-23 DIAGNOSIS — L91.8 SKIN TAG: Primary | ICD-10-CM

## 2024-05-23 DIAGNOSIS — L91.8 SKIN TAG: ICD-10-CM

## 2024-05-23 DIAGNOSIS — L85.3 XEROSIS OF SKIN: ICD-10-CM

## 2024-05-23 DIAGNOSIS — D18.01 CHERRY ANGIOMA: ICD-10-CM

## 2024-05-23 DIAGNOSIS — L82.1 SEBORRHEIC KERATOSIS: ICD-10-CM

## 2024-05-23 PROCEDURE — SKNTGDERM SKIN TAG DERM ADD ON: Performed by: DERMATOLOGY

## 2024-05-23 PROCEDURE — 99203 OFFICE O/P NEW LOW 30 MIN: CPT | Performed by: DERMATOLOGY

## 2024-05-23 RX ORDER — OMEPRAZOLE 20 MG/1
20 CAPSULE, DELAYED RELEASE ORAL DAILY
COMMUNITY

## 2024-05-23 NOTE — PROGRESS NOTES
"[facility] Note     Patient Name: Leeanna Lacy  Encounter Date: 5/23/24     Have you been cared for by a [facility] in the last 3 years and, if so, which description applies to you?    NO.   I am considered a \"new\" patient and must complete all patient intake questions. I am FEMALE/of child-bearing potential.    REVIEW OF SYSTEMS:  Have you recently had or currently have any of the following? Recent fever or chills? No  Any non-healing wound? No  Are you pregnant or planning to become pregnant? No  Are you currently or planning to be nursing or breast feeding? No   PAST MEDICAL HISTORY:  Have you personally ever had or currently have any of the following?  If \"YES,\" then please provide more detail. Skin cancer (such as Melanoma, Basal Cell Carcinoma, Squamous Cell Carcinoma?  No  Tuberculosis, HIV/AIDS, Hepatitis B or C: No  Radiation Treatment No   HISTORY OF IMMUNOSUPPRESSION:   Do you have a history of any of the following:  Systemic Immunosuppression such as Diabetes, Biologic or Immunotherapy, Chemotherapy, Organ Transplantation, Bone Marrow Transplantation?  No    Answering \"YES\" requires the addition of the dotphrase \"IMMUNOSUPPRESSED\" as the first diagnosis of the patient's visit.   FAMILY HISTORY:  Any \"first degree relatives\" (parent, brother, sister, or child) with the following?    Skin Cancer, Pancreatic or Other Cancer? No   PATIENT EXPERIENCE:    Do you want the Dermatologist to perform a COMPLETE skin exam today including a clinical examination under the \"bra and underwear\" areas?  Yes  If necessary, do we have your permission to call and leave a detailed message on your Preferred Phone number that includes your specific medical information?  Yes      Allergies   Allergen Reactions    Codeine Tachycardia    Penicillins Hives    Prednisone Irritability and Hyperactivity    Sulfa Antibiotics Hives    Tetracycline       Current Outpatient Medications:     calcium " "carbonate (OS-ERICKA) 600 MG tablet, Take 1 tablet (600 mg total) by mouth 2 (two) times a day with meals, Disp: , Rfl:     esomeprazole (NexIUM) 20 mg capsule, Take 1 capsule by mouth daily, Disp: , Rfl:     fluocinolone acetonide (DermOtic) 0.01 % otic oil, Administer 5 drops into both ears 2 (two) times a day, Disp: 20 mL, Rfl: 0    multivitamin (THERAGRAN) TABS, Take 1 tablet by mouth daily, Disp: , Rfl:     sertraline (ZOLOFT) 50 mg tablet, Take 1 tablet (50 mg total) by mouth daily, Disp: 90 tablet, Rfl: 3          Whom besides the patient is providing clinical information about today's encounter?   NO ADDITIONAL HISTORIAN (patient alone provided history)    Physical Exam and Assessment/Plan by Diagnosis:  3 skin tags, inner and upper left leg and one on left side of neck.    MELANOCYTIC NEVI (\"Moles\")    Physical Exam:  Anatomic Location Affected:   Mostly on sun-exposed areas of the trunk and extremities  Morphological Description:  Scattered, 1-4mm round to ovoid, symmetrical-appearing, even bordered, skin colored to dark brown macules/papules, mostly in sun-exposed areas  Pertinent Positives:  Pertinent Negatives:    Additional History of Present Condition:      Assessment and Plan:  Based on a thorough discussion of this condition and the management approach to it (including a comprehensive discussion of the known risks, side effects and potential benefits of treatment), the patient (family) agrees to implement the following specific plan:  When outside we recommend using a wide brim hat, sunglasses, long sleeve and pants, sunscreen with SPF 30+ with reapplication every 2 hours, or SPF specific clothing   Benign, reassured  Annual skin check     Melanocytic Nevi  Melanocytic nevi (\"moles\") are tan or brown, raised or flat areas of the skin which have an increased number of melanocytes. Melanocytes are the cells in our body which make pigment and account for skin color.    Some moles are present at birth " "(I.e., \"congenital nevi\"), while others come up later in life (i.e., \"acquired nevi\").  The sun can stimulate the body to make more moles.  Sunburns are not the only thing that triggers more moles.  Chronic sun exposure can do it too.     Clinically distinguishing a healthy mole from melanoma may be difficult, even for experienced dermatologists. The \"ABCDE's\" of moles have been suggested as a means of helping to alert a person to a suspicious mole and the possible increased risk of melanoma.  The suggestions for raising alert are as follows:    Asymmetry: Healthy moles tend to be symmetric, while melanomas are often asymmetric.  Asymmetry means if you draw a line through the mole, the two halves do not match in color, size, shape, or surface texture. Asymmetry can be a result of rapid enlargement of a mole, the development of a raised area on a previously flat lesion, scaling, ulceration, bleeding or scabbing within the mole.  Any mole that starts to demonstrate \"asymmetry\" should be examined promptly by a board certified dermatologist.     Border: Healthy moles tend to have discrete, even borders.  The border of a melanoma often blends into the normal skin and does not sharply delineate the mole from normal skin.  Any mole that starts to demonstrate \"uneven borders\" should be examined promptly by a board certified dermatologist.     Color: Healthy moles tend to be one color throughout.  Melanomas tend to be made up of different colors ranging from dark black, blue, white, or red.  Any mole that demonstrates a color change should be examined promptly by a board certified dermatologist.     Diameter: Healthy moles tend to be smaller than 0.6 cm in size; an exception are \"congenital nevi\" that can be larger.  Melanomas tend to grow and can often be greater than 0.6 cm (1/4 of an inch, or the size of a pencil eraser). This is only a guideline, and many normal moles may be larger than 0.6 cm without being unhealthy.  " "Any mole that starts to change in size (small to bigger or bigger to smaller) should be examined promptly by a board certified dermatologist.     Evolving: Healthy moles tend to \"stay the same.\"  Melanomas may often show signs of change or evolution such as a change in size, shape, color, or elevation.  Any mole that starts to itch, bleed, crust, burn, hurt, or ulcerate or demonstrate a change or evolution should be examined promptly by a board certified dermatologist.        LENTIGO    Physical Exam:  Anatomic Location Affected:  trunk, arms  Morphological Description:  Light brown macules  Pertinent Positives:  Pertinent Negatives:    Additional History of Present Condition:      Assessment and Plan:  Based on a thorough discussion of this condition and the management approach to it (including a comprehensive discussion of the known risks, side effects and potential benefits of treatment), the patient (family) agrees to implement the following specific plan:  When outside we recommend using a wide brim hat, sunglasses, long sleeve and pants, sunscreen with SPF 30+ with reapplication every 2 hours, or SPF specific clothing       What is a lentigo?  A lentigo is a pigmented flat or slightly raised lesion with a clearly defined edge. Unlike an ephelis (freckle), it does not fade in the winter months. There are several kinds of lentigo.  The name lentigo originally referred to its appearance resembling a small lentil. The plural of lentigo is lentigines, although “lentigos” is also in common use.    Who gets lentigines?  Lentigines can affect males and females of all ages and races. Solar lentigines are especially prevalent in fair skinned adults. Lentigines associated with syndromes are present at birth or arise during childhood.    What causes lentigines?  Common forms of lentigo are due to exposure to ultraviolet radiation:  Sun damage including sunburn   Indoor tanning   Phototherapy, especially photochemotherapy " "(PUVA)    Ionizing radiation, eg radiation therapy, can also cause lentigines.  Several familial syndromes associated with widespread lentigines originate from mutations in Tito-MAP kinase, mTOR signaling and PTEN pathways.    What is the treatment for lentigines?  Most lentigines are left alone. Attempts to lighten them may not be successful. The following approaches are used:  SPF 50+ broad-spectrum sunscreen   Hydroquinone bleaching cream   Alpha hydroxy acids   Vitamin C   Retinoids   Azelaic acid   Chemical peels  Individual lesions can be permanently removed using:  Cryotherapy   Intense pulsed light   Pigment lasers    How can lentigines be prevented?  Lentigines associated with exposure ultraviolet radiation can be prevented by very careful sun protection. Clothing is more successful at preventing new lentigines than are sunscreens.    What is the outlook for lentigines?  Lentigines usually persist. They may increase in number with age and sun exposure. Some in sun-protected sites may fade and disappear.    PATEL ANGIOMAS    Physical Exam:  Anatomic Location Affected:  trunk  Morphological Description:  Scattered cherry red, 1-4 mm papules.  Pertinent Positives:  Pertinent Negatives:    Additional History of Present Condition:      Assessment and Plan:  Based on a thorough discussion of this condition and the management approach to it (including a comprehensive discussion of the known risks, side effects and potential benefits of treatment), the patient (family) agrees to implement the following specific plan:  Monitor for changes  Benign, reassured      Assessment and Plan:    Cherry angioma, also known as Edgar de Tyler spots, are benign vascular skin lesions. A \"cherry angioma\" is a firm red, blue or purple papule, 0.1-1 cm in diameter. When thrombosed, they can appear black in colour until evaluated with a dermatoscope when the red or purple colour is more easily seen. Cherry angioma may develop on " "any part of the body but most often appear on the scalp, face, lips and trunk.  An angioma is due to proliferating endothelial cells; these are the cells that line the inside of a blood vessel.    Angiomas can arise in early life or later in life; the most common type of angioma is a cherry angioma.  Cherry angiomas are very common in males and females of any age or race. They are more noticeable in white skin than in skin of colour. They markedly increase in number from about the age of 40. There may be a family history of similar lesions. Eruptive cherry angiomas have been rarely reported to be associated with internal malignancy. The cause of angiomas is unknown. Genetic analysis of cherry angiomas has shown that they frequently carry specific somatic missense mutations in the GNAQ and GNA11 (Q209H) genes, which are involved in other vascular and melanocytic proliferations.      SEBORRHEIC KERATOSIS; NON-INFLAMED    Physical Exam:  Anatomic Location Affected:  trunk  Morphological Description:  Flat and raised, waxy, smooth to warty textured, yellow to brownish-grey to dark brown to blackish, discrete, \"stuck-on\" appearing papules.  Pertinent Positives:  Pertinent Negatives:    Additional History of Present Condition:      Assessment and Plan:  Based on a thorough discussion of this condition and the management approach to it (including a comprehensive discussion of the known risks, side effects and potential benefits of treatment), the patient (family) agrees to implement the following specific plan:  Monitor for changes  Benign, reassured      Seborrheic Keratosis  A seborrheic keratosis is a harmless warty spot that appears during adult life as a common sign of skin aging.  Seborrheic keratoses can arise on any area of skin, covered or uncovered, with the usual exception of the palms and soles. They do not arise from mucous membranes. Seborrheic keratoses can have highly variable appearance.      Seborrheic " "keratoses are extremely common. It has been estimated that over 90% of adults over the age of 60 years have one or more of them. They occur in males and females of all races, typically beginning to erupt in the 30s or 40s. They are uncommon under the age of 20 years.  The precise cause of seborrhoeic keratoses is not known.  Seborrhoeic keratoses are considered degenerative in nature. As time goes by, seborrheic keratoses tend to become more numerous. Some people inherit a tendency to develop a very large number of them; some people may have hundreds of them.      There is no easy way to remove multiple lesions on a single occasion.  Unless a specific lesion is \"inflamed\" and is causing pain or stinging/burning or is bleeding, most insurance companies do not authorize treatment.    XEROSIS (\"DRY SKIN\")    Physical Exam:  Anatomic Location Affected:  diffuse  Morphological Description:  xerosis  Pertinent Positives:  Pertinent Negatives:    Additional History of Present Condition:      Assessment and Plan:  Based on a thorough discussion of this condition and the management approach to it (including a comprehensive discussion of the known risks, side effects and potential benefits of treatment), the patient (family) agrees to implement the following specific plan:  Use moisturizer like Eucerin,Cerave or Aveeno Cream 3 times a day for the dry skin            Dry skin refers to skin that feels dry to touch. Dry skin has a dull surface with a rough, scaly quality. The skin is less pliable and cracked. When dryness is severe, the skin may become inflamed and fissured.  Although any body site can be dry, dry skin tends to affect the shins more than any other site.    Dry skin is lacking moisture in the outer horny cell layer (stratum corneum) and this results in cracks in the skin surface.  Dry skin is also called xerosis, xeroderma or asteatosis (lack of fat).  It can affect males and females of all ages. There is some " racial variability in water and lipid content of the skin.  Dry skin that starts in early childhood may be one of about 20 types of ichthyosis (fish-scale skin). There is often a family history of dry skin.   Dry skin is commonly seen in people with atopic dermatitis.  Nearly everyone > 60 years has dry skin.    Dry skin that begins later may be seen in people with certain diseases and conditions.  Postmenopausal women  Hypothyroidism  Chronic renal disease   Malnutrition and weight loss   Subclinical dermatitis   Treatment with certain drugs such as oral retinoids, diuretics and epidermal growth factor receptor inhibitors      What is the treatment for dry skin?  The mainstay of treatment of dry skin and ichthyosis is moisturisers/emollients. They should be applied liberally and often enough to:  Reduce itch   Improve the barrier function   Prevent entry of irritants, bacteria   Reduce transepidermal water loss.      How can dry skin be prevented?  Eliminate aggravating factors:  Reduce the frequency of bathing.   A humidifier in winter and air conditioner in summer   Compare having a short shower with a prolonged soak in a bath.   Use lukewarm, not hot, water.   Replace standard soap with a substitute such as a synthetic detergent cleanser, water-miscible emollient, bath oil, anti-pruritic tar oil, colloidal oatmeal etc.   Apply an emollient liberally and often, particularly shortly after bathing, and when itchy. The drier the skin, the thicker this should be, especially on the hands.    What is the outlook for dry skin?  A tendency to dry skin may persist life-long, or it may improve once contributing factors are controlled.       RASH    Physical Exam:  (Anatomic Location); (Size and Morphological Description); (Differential Diagnosis):  Ear canals; swollen and pink  Pertinent Positives:  Pertinent Negatives:    Additional History of Present Condition: Patient had condition for awhile. Tried a steroid cream but  "patient unsure of the name. Prescribed Fluocinolone Acetonide 0.01% otic oil by ENT-currently still using     Assessment and Plan:  Based on a thorough discussion of this condition and the management approach to it (including a comprehensive discussion of the known risks, side effects and potential benefits of treatment), the patient (family) agrees to implement the following specific plan:  Can continue ear drops from ENT  Recommended to try Vaseline      ACROCHORDON (\"SKIN TAG\")    Physical Exam:  Anatomic Location Affected:  neck and left thigh   Morphological Description:  Skin colored and brown pedunculated papules   Pertinent Positives:  Pertinent Negatives:    Additional History of Present Condition:  Had them for some time. Left thigh seems to bother patient the most.      Assessment and Plan:  Based on a thorough discussion of this condition and the management approach to it (including a comprehensive discussion of the known risks, side effects and potential benefits of treatment), the patient (family) agrees to implement the following specific plan:  Separate cosmetic visit     Skin tags are common, soft, harmless skin lesions that are also called, in the appropriate settings, papillomas, fibroepithelial polyps, and soft fibromas.  They are made up of loosely arranged collagen fibers and blood vessels surrounded by a thickened or thinned-out epidermis.    Skin tags tend to develop in both men and women as we grow older.  They are usually found on the skin folds (neck, armpits, groin).  It is not known what specifically causes skin tags.  Certain factors, though, do appear to play a role:  Chaffing and irritation from skin rubbing together  High levels of growth factors (as seen, for example, in pregnancy or in acromegaly/gigantism)  Insulin resistance  Human papillomavirus (wart virus)    We discussed that most skin tags do not need to be treated unless they are specifically causing the patient physical " distress or limitation or pose a risk for a larger problem such as an infection that forms secondary to excoriation or chronic irritation.    We had a thorough discussion of treatment options and specific risks (including that any procedural treatment may not be covered by insurance and would then be the patient's responsibility) and benefits/alternatives including but not limited to the following:  Cryotherapy (freezing)  Shave removal  Surgical excision (snip excision with scissors)  Electrosurgery  Ligation (we do not do this procedure and counseled against it due to risk of tissue necrosis and infection)     Scribe Attestation      I,:  Judy Arce am acting as a scribe while in the presence of the attending physician.:       I,:  Devora Ying MD personally performed the services described in this documentation    as scribed in my presence.:

## 2024-05-23 NOTE — PROGRESS NOTES
THIS IS A COSMETIC PATIENT WHO PAID OUT OF POCKET AT TIME OF VISIT. DO NOT BILL.   $40  THE PATIENT ALREADY PAID IN FULL FOR SERVICES    PROCEDURE:  DESTRUCTION OF BENIGN LESIONS WITH CRYOTHERAPY  After a thorough discussion of treatment options and risk/benefits/alternatives (including but not limited to local pain, scarring, dyspigmentation, blistering, and possible superinfection), verbal and written consent were obtained and the aforementioned lesions were treated on with cryotherapy using liquid nitrogen x 1 cycle for 5-10 seconds.    TOTAL NUMBER of 2 lesions were treated today on the ANATOMIC LOCATION: left thigh.    The patient tolerated the procedure well, and after-care instructions were provided.           Scribe Attestation      I,:  Judy Arce am acting as a scribe while in the presence of the attending physician.:       I,:  Devora Ying MD personally performed the services described in this documentation    as scribed in my presence.:

## 2024-05-23 NOTE — PATIENT INSTRUCTIONS
PROCEDURE:  DESTRUCTION OF BENIGN LESIONS WITH CRYOTHERAPY  After a thorough discussion of treatment options and risk/benefits/alternatives (including but not limited to local pain, scarring, dyspigmentation, blistering, and possible superinfection), verbal and written consent were obtained and the aforementioned lesions were treated on with cryotherapy using liquid nitrogen x 1 cycle for 5-10 seconds.    TOTAL NUMBER of 2 lesions were treated today on the ANATOMIC LOCATION: left thigh.    The patient tolerated the procedure well, and after-care instructions were provided.
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
## 2024-05-31 ENCOUNTER — APPOINTMENT (OUTPATIENT)
Dept: LAB | Facility: CLINIC | Age: 54
End: 2024-05-31
Payer: COMMERCIAL

## 2024-05-31 ENCOUNTER — OFFICE VISIT (OUTPATIENT)
Dept: FAMILY MEDICINE CLINIC | Facility: CLINIC | Age: 54
End: 2024-05-31
Payer: COMMERCIAL

## 2024-05-31 VITALS
DIASTOLIC BLOOD PRESSURE: 96 MMHG | TEMPERATURE: 98.2 F | RESPIRATION RATE: 16 BRPM | WEIGHT: 213 LBS | OXYGEN SATURATION: 98 % | HEART RATE: 86 BPM | HEIGHT: 64 IN | SYSTOLIC BLOOD PRESSURE: 164 MMHG | BODY MASS INDEX: 36.37 KG/M2

## 2024-05-31 DIAGNOSIS — F41.1 GENERALIZED ANXIETY DISORDER: ICD-10-CM

## 2024-05-31 DIAGNOSIS — R69 TAKING MEDICATION FOR CHRONIC DISEASE: ICD-10-CM

## 2024-05-31 DIAGNOSIS — N39.0 URINARY TRACT INFECTION WITHOUT HEMATURIA, SITE UNSPECIFIED: ICD-10-CM

## 2024-05-31 DIAGNOSIS — E55.9 VITAMIN D DEFICIENCY: ICD-10-CM

## 2024-05-31 DIAGNOSIS — R19.5 LOOSE STOOLS: ICD-10-CM
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
## 2024-05-31 DIAGNOSIS — R10.84 GENERALIZED ABDOMINAL PAIN: Primary | ICD-10-CM

## 2024-05-31 DIAGNOSIS — S80.869A TICK BITE OF LOWER LEG, UNSPECIFIED LATERALITY, INITIAL ENCOUNTER: ICD-10-CM

## 2024-05-31 PROCEDURE — 99213 OFFICE O/P EST LOW 20 MIN: CPT | Performed by: PHYSICIAN ASSISTANT

## 2024-05-31 PROCEDURE — 87209 SMEAR COMPLEX STAIN: CPT

## 2024-05-31 PROCEDURE — 87493 C DIFF AMPLIFIED PROBE: CPT

## 2024-05-31 PROCEDURE — 87177 OVA AND PARASITES SMEARS: CPT

## 2024-05-31 PROCEDURE — 87505 NFCT AGENT DETECTION GI: CPT

## 2024-05-31 RX ORDER — DICYCLOMINE HCL 20 MG
20 TABLET ORAL EVERY 6 HOURS
Qty: 40 TABLET | Refills: 0 | Status: SHIPPED | OUTPATIENT
Start: 2024-05-31 | End: 2024-05-31

## 2024-05-31 RX ORDER — DICYCLOMINE HCL 20 MG
20 TABLET ORAL EVERY 6 HOURS
Qty: 40 TABLET | Refills: 0 | Status: SHIPPED | OUTPATIENT
Start: 2024-05-31

## 2024-05-31 NOTE — PROGRESS NOTES
Assessment/Plan:    Abdominal pain/bloating - probable IBS, will do stool cultures due to recent trip to [place] and change in BM, continue Align, BRAT diet, start bentyl 20 mg qid for weekend, call if no improvement on Monday    F/u as needed    Subjective:   Chief Complaint   Patient presents with    Abdominal Pain     Pt was travelling and came home 5/18  On 5/20 started with diffuse abdominal pressure and bloating  Frequent belching and gas, nausea. Loose yellow stool once a day        Patient ID: Leeanna Lacy is a 54 y.o. female.    Patient here with upset stomach, bloating, gurgling noises, not eating or drinking much. Eating can make it worse. Loose yellow stool once a day. Returned home from vacation in [facility] where they are careful to drink bottle water, on the 18th, daughter had surgery 20th and patient was concerned about it, noted around that time stomach was unsettled. Figured it was from travel and worrying about daughter but is not calming down now as her life is calming down. Moving bowels once a day with yellow stool. Nauseated. Tried pepto with no relief.     Feels this has happened in the past with returning from travel, no one else on trip has these symptoms but her.    Abdominal Pain  This is a new problem. The current episode started in the past 7 days. The onset quality is sudden. The problem occurs constantly. The problem has been gradually worsening. The pain is located in the generalized abdominal region. The pain is at a severity of 4/10. The quality of the pain is a sensation of fullness. The abdominal pain does not radiate. Associated symptoms include belching, diarrhea, flatus and nausea. Nothing aggravates the pain. The pain is relieved by Nothing.       The following portions of the patient's history were reviewed and updated as appropriate: allergies, current medications, past family history, past medical history, past social history, past surgical history, and problem list.    Past  Medical History:   Diagnosis Date    Abnormal weight gain     Allergic Child    Anxiety     Arthralgia     GERD (gastroesophageal reflux disease)     Panic attack     Stomatitis     last assessed 13; resolved 16     Past Surgical History:   Procedure Laterality Date    CHOLECYSTECTOMY      CHOLECYSTECTOMY LAPAROSCOPIC      WISDOM TOOTH EXTRACTION       Family History   Problem Relation Age of Onset    Diabetes Mother     Breast cancer Mother 74    Asthma Mother     Hypertension Mother     Heart failure Father     Coronary artery disease Father     Diabetes Father             Other Father         pure hypercholesterolemia    No Known Problems Daughter     No Known Problems Daughter     Pancreatic cancer Paternal Grandmother     No Known Problems Maternal Aunt     No Known Problems Maternal Aunt     Breast cancer Paternal Aunt     Cancer Family     Mental illness Neg Hx     Substance Abuse Neg Hx     Colon cancer Neg Hx     Ovarian cancer Neg Hx     Endometrial cancer Neg Hx      Social History     Socioeconomic History    Marital status: /Civil Union     Spouse name: Not on file    Number of children: Not on file    Years of education: Not on file    Highest education level: Not on file   Occupational History    Not on file   Tobacco Use    Smoking status: Never    Smokeless tobacco: Never   Vaping Use    Vaping status: Never Used   Substance and Sexual Activity    Alcohol use: Yes     Alcohol/week: 1.0 standard drink of alcohol     Types: 1 Glasses of wine per week     Comment: Social    Drug use: No    Sexual activity: Yes     Partners: Male     Birth control/protection: Male Sterilization   Other Topics Concern    Not on file   Social History Narrative    Caffeine use - Nominal     Uses safety equipment - smoke detectors     Uses seat belts      Social Determinants of Health     Financial Resource Strain: Not on file   Food Insecurity: Not on file   Transportation Needs: Not on file  "  Physical Activity: Not on file   Stress: Not on file   Social Connections: Not on file   Intimate Partner Violence: Not on file   Housing Stability: Not on file       Current Outpatient Medications:     calcium carbonate (OS-ERICKA) 600 MG tablet, Take 1 tablet (600 mg total) by mouth 2 (two) times a day with meals, Disp: , Rfl:     fluocinolone acetonide (DermOtic) 0.01 % otic oil, Administer 5 drops into both ears 2 (two) times a day, Disp: 20 mL, Rfl: 0    multivitamin (THERAGRAN) TABS, Take 1 tablet by mouth daily, Disp: , Rfl:     omeprazole (PriLOSEC) 20 mg delayed release capsule, Take 20 mg by mouth daily, Disp: , Rfl:     sertraline (ZOLOFT) 50 mg tablet, Take 1 tablet (50 mg total) by mouth daily (Patient taking differently: Take 25 mg by mouth daily), Disp: 90 tablet, Rfl: 3    esomeprazole (NexIUM) 20 mg capsule, Take 1 capsule by mouth daily (Patient not taking: Reported on 5/23/2024), Disp: , Rfl:     Review of Systems   Gastrointestinal:  Positive for abdominal pain, diarrhea, flatus and nausea.             Objective:    Vitals:    05/31/24 0949   BP: 164/96   Pulse: 86   Resp: 16   Temp: 98.2 °F (36.8 °C)   TempSrc: Temporal   SpO2: 98%   Weight: 96.6 kg (213 lb)   Height: 5' 3.5\" (1.613 m)        Physical Exam  Constitutional:       Appearance: Normal appearance. She is well-developed and normal weight.   HENT:      Head: Normocephalic and atraumatic.   Neck:      Vascular: No carotid bruit.   Cardiovascular:      Rate and Rhythm: Normal rate and regular rhythm.      Pulses: Normal pulses.      Heart sounds: Normal heart sounds.   Pulmonary:      Effort: Pulmonary effort is normal.      Breath sounds: Normal breath sounds.   Abdominal:      General: Bowel sounds are normal. There is no distension.      Palpations: Abdomen is soft. There is no mass.      Tenderness: There is no abdominal tenderness. There is no guarding or rebound.   Musculoskeletal:         General: Normal range of motion.      " Cervical back: Normal range of motion and neck supple.      Right lower leg: No edema.      Left lower leg: No edema.   Skin:     General: Skin is warm.   Neurological:      General: No focal deficit present.      Mental Status: She is alert and oriented to person, place, and time.   Psychiatric:         Mood and Affect: Mood normal.         Behavior: Behavior normal.         Thought Content: Thought content normal.         Judgment: Judgment normal.

## 2024-06-01 LAB
C COLI+JEJUNI TUF STL QL NAA+PROBE: NEGATIVE
C DIFF TOX GENS STL QL NAA+PROBE: NEGATIVE
EC STX1+STX2 GENES STL QL NAA+PROBE: NEGATIVE
SALMONELLA SP SPAO STL QL NAA+PROBE: NEGATIVE
SHIGELLA SP+EIEC IPAH STL QL NAA+PROBE: NEGATIVE

## 2024-06-02 ENCOUNTER — AMB VIDEO VISIT (OUTPATIENT)
Dept: OTHER | Facility: HOSPITAL | Age: 54
End: 2024-06-02
Payer: COMMERCIAL

## 2024-06-02 DIAGNOSIS — N30.00 ACUTE CYSTITIS WITHOUT HEMATURIA: Primary | ICD-10-CM

## 2024-06-02 PROCEDURE — 99212 OFFICE O/P EST SF 10 MIN: CPT | Performed by: PHYSICIAN ASSISTANT

## 2024-06-02 RX ORDER — NITROFURANTOIN 25; 75 MG/1; MG/1
100 CAPSULE ORAL 2 TIMES DAILY
Qty: 10 CAPSULE | Refills: 0 | Status: SHIPPED | OUTPATIENT
Start: 2024-06-02 | End: 2024-06-07

## 2024-06-02 RX ORDER — NITROFURANTOIN 25; 75 MG/1; MG/1
100 CAPSULE ORAL 2 TIMES DAILY
Qty: 10 CAPSULE | Refills: 0 | Status: SHIPPED | OUTPATIENT
Start: 2024-06-02 | End: 2024-06-02 | Stop reason: SDUPTHER

## 2024-06-02 RX ORDER — PHENAZOPYRIDINE HYDROCHLORIDE 95 MG/1
95 TABLET ORAL 3 TIMES DAILY PRN
Qty: 10 TABLET | Refills: 0 | Status: SHIPPED | OUTPATIENT
Start: 2024-06-02 | End: 2024-06-02 | Stop reason: SDUPTHER

## 2024-06-02 RX ORDER — PHENAZOPYRIDINE HYDROCHLORIDE 95 MG/1
95 TABLET ORAL 3 TIMES DAILY PRN
Qty: 10 TABLET | Refills: 0 | Status: SHIPPED | OUTPATIENT
Start: 2024-06-02

## 2024-06-02 NOTE — PROGRESS NOTES
Required Documentation:  Encounter provider: Kofi Hooper PA-C    Identify all parties in room with patient during virtual visit:  No one else    The patient was identified by name and date of birth. Leeanna Lacy was informed that this is a telemedicine visit and that the visit is being conducted through the [employer] platform. She agrees to proceed..  My office door was closed. No one else was in the room.  She acknowledged consent and understanding of privacy and security of the video platform. The patient has agreed to participate and understands they can discontinue the visit at any time.    Verification of patient location:  Patient is located at Home in the following state in which I hold an active license [place]    Patient is aware this is a billable service.     Reason for visit is No chief complaint on file.       Subjective  HPI   Pt reports urinary urgency frequency burning starting today. Hesitancy starting yesterday. Pressure as well. Suprapubic tenderness. Incomplete emptying. No fever or chills. No back or flank pain.     Past Medical History:   Diagnosis Date   • Abnormal weight gain    • Allergic Child   • Anxiety    • Arthralgia    • GERD (gastroesophageal reflux disease)    • Panic attack    • Stomatitis     last assessed 7/22/13; resolved 5/4/16       Past Surgical History:   Procedure Laterality Date   • CHOLECYSTECTOMY     • CHOLECYSTECTOMY LAPAROSCOPIC     • WISDOM TOOTH EXTRACTION          Allergies   Allergen Reactions   • Codeine Tachycardia   • Penicillins Hives   • Prednisone Irritability and Hyperactivity   • Sulfa Antibiotics Hives   • Tetracycline        Review of Systems   Constitutional:  Negative for chills and fever.   Genitourinary:  Positive for difficulty urinating, dysuria, frequency and urgency. Negative for flank pain and hematuria.       Video Exam    There were no vitals filed for this visit.    Physical Exam  Constitutional:       General: She is not in acute  distress.     Appearance: Normal appearance. She is not ill-appearing or toxic-appearing.   HENT:      Head: Normocephalic and atraumatic.      Nose: No rhinorrhea.      Mouth/Throat:      Mouth: Mucous membranes are moist.      Pharynx: No posterior oropharyngeal erythema.   Eyes:      General: No scleral icterus.        Right eye: No discharge.         Left eye: No discharge.      Extraocular Movements: Extraocular movements intact.   Cardiovascular:      Rate and Rhythm: Normal rate.   Pulmonary:      Effort: Pulmonary effort is normal. No respiratory distress.   Musculoskeletal:      Cervical back: Neck supple.   Skin:     Findings: No erythema.   Neurological:      Mental Status: She is alert and oriented to person, place, and time.      Cranial Nerves: No dysarthria or facial asymmetry.   Psychiatric:         Mood and Affect: Mood normal.         Behavior: Behavior normal.         Visit Time  Total Visit Duration: 8 minutes    Assessment/Plan:    Diagnoses and all orders for this visit:    Acute cystitis without hematuria  -     Discontinue: phenazopyridine (PYRIDIUM) 95 MG tablet; Take 1 tablet (95 mg total) by mouth 3 (three) times a day as needed for bladder spasms  -     Discontinue: nitrofurantoin (MACROBID) 100 mg capsule; Take 1 capsule (100 mg total) by mouth 2 (two) times a day for 5 days  -     phenazopyridine (PYRIDIUM) 95 MG tablet; Take 1 tablet (95 mg total) by mouth 3 (three) times a day as needed for bladder spasms  -     nitrofurantoin (MACROBID) 100 mg capsule; Take 1 capsule (100 mg total) by mouth 2 (two) times a day for 5 days  -     Urine culture; Future      If UTI does not resolve patient will go for urine cultures and follow-up with PCP    There are no Patient Instructions on file for this visit.

## 2024-06-10 NOTE — CARE ANYWHERE EVISITS
Visit Summary for SINCERE JONAS - Gender: Female - Date of Birth: 1970  Date: [id number] - Duration: 8 minutes  Patient: SINCERE JONAS  Provider: Kofi Hooper PA-C    Patient Contact Information  Address  [address]  [place]; [place] [address]  [phone]    Visit Topics  Urinary tract infection   [Added By: Self - 2024-06-02]    Triage Questions   What is your current physical address in the event of a medical emergency? Answer []  Are you allergic to any medications? Answer []  Are you now or could you be pregnant? Answer []  Do you have any immune system compromise or chronic lung   disease? Answer []  Do you have any vulnerable family members in the home (infant, pregnant, cancer, elderly)? Answer []     Conversation Transcripts  [0A][0A] [Notification] You are connected with Kofi Hooper PA-C, Urgent Care Specialist.[0A][Notification] SINCERE JONAS is located in [place].[0A][Notification] SINCERE JONAS has shared health history...[0A]    Diagnosis  Acute cystitis without hematuria    Procedures  Value: 99419 Code: CPT-4 UNLISTED E&M SERVICE    Medications Prescribed    No prescriptions ordered    Electronically signed by: Kofi Hooper PA-C(NPI [id number])

## 2024-06-18 ENCOUNTER — ANNUAL EXAM (OUTPATIENT)
Age: 54
End: 2024-06-18
Payer: COMMERCIAL

## 2024-06-18 VITALS
HEIGHT: 63 IN | DIASTOLIC BLOOD PRESSURE: 82 MMHG | BODY MASS INDEX: 37.88 KG/M2 | SYSTOLIC BLOOD PRESSURE: 128 MMHG | WEIGHT: 213.8 LBS

## 2024-06-18 DIAGNOSIS — N95.2 VAGINAL ATROPHY: ICD-10-CM

## 2024-06-18 DIAGNOSIS — Z01.419 ENCOUNTER FOR ANNUAL ROUTINE GYNECOLOGICAL EXAMINATION: Primary | ICD-10-CM

## 2024-06-18 DIAGNOSIS — Z12.31 ENCOUNTER FOR SCREENING MAMMOGRAM FOR MALIGNANT NEOPLASM OF BREAST: ICD-10-CM

## 2024-06-18 PROCEDURE — S0612 ANNUAL GYNECOLOGICAL EXAMINA: HCPCS | Performed by: OBSTETRICS & GYNECOLOGY

## 2024-06-18 RX ORDER — ESOMEPRAZOLE MAGNESIUM 20 MG/1
20 GRANULE, DELAYED RELEASE ORAL
COMMUNITY
Start: 2024-06-12

## 2024-06-18 RX ORDER — ESTRADIOL 0.1 MG/G
CREAM VAGINAL
Qty: 42.5 G | Refills: 2 | Status: SHIPPED | OUTPATIENT
Start: 2024-06-18 | End: 2024-06-18 | Stop reason: SDUPTHER

## 2024-06-18 RX ORDER — ESTRADIOL 0.1 MG/G
CREAM VAGINAL
Qty: 42.5 G | Refills: 3 | Status: SHIPPED | OUTPATIENT
Start: 2024-06-18 | End: 2024-09-30

## 2024-06-18 NOTE — PROGRESS NOTES
Assessment/Plan:      Encounter for annual routine gynecological examination    Encounter for screening mammogram for malignant neoplasm of breast  -     Mammo screening bilateral w 3d & cad; Future    Vaginal atrophy  -     Discontinue: estradiol (ESTRACE VAGINAL) 0.1 mg/g vaginal cream; Insert 1 g into the vagina daily for 14 days, THEN 1 g 2 (two) times a week.  -     estradiol (ESTRACE VAGINAL) 0.1 mg/g vaginal cream; Insert 1 g into the vagina daily for 14 days, THEN 1 g 2 (two) times a week.           Subjective      Leeanna Lacy is a 54 y.o. female who presents for annual exam. She is struggling with vaginal dryness and painful IC.  + UTI recently.   The patient is not taking hormone replacement therapy. Patient denies post-menopausal vaginal bleeding.  She denies any breast concerns.     Menstrual History:  OB History          2    Para   2    Term   2       0    AB   0    Living   2         SAB   0    IAB   0    Ectopic   0    Multiple   0    Live Births   2                Patient's last menstrual period was 2022 (exact date).     Past Medical History:   Diagnosis Date    Abnormal weight gain     Allergic Child    Anxiety     Arthralgia     GERD (gastroesophageal reflux disease)     Panic attack     Stomatitis     last assessed 13; resolved 16       Family History   Problem Relation Age of Onset    Diabetes Mother     Breast cancer Mother 74    Asthma Mother     Hypertension Mother     Heart failure Father     Coronary artery disease Father     Diabetes Father             Other Father         pure hypercholesterolemia    No Known Problems Daughter     No Known Problems Daughter     Pancreatic cancer Paternal Grandmother     No Known Problems Maternal Aunt     No Known Problems Maternal Aunt     Breast cancer Paternal Aunt     Cancer Family     Mental illness Neg Hx     Substance Abuse Neg Hx     Colon cancer Neg Hx     Ovarian cancer Neg Hx     Endometrial cancer Neg  "Hx       The following portions of the patient's history were reviewed and updated as appropriate: allergies, current medications, past family history, past medical history, past social history, past surgical history, and problem list.    Review of Systems  Pertinent items are noted in HPI.     Objective      /82 (BP Location: Right arm, Patient Position: Sitting, Cuff Size: Large)   Ht 5' 3\" (1.6 m)   Wt 97 kg (213 lb 12.8 oz)   LMP 05/31/2022 (Exact Date)   BMI 37.87 kg/m²     General:   alert and oriented, in no acute distress   Heart:  Breasts: regular rate and rhythm  appear normal, no suspicious masses, no skin or nipple changes or axillary nodes.   Lungs: Effort normal   Abdomen: soft, non-tender, without masses or organomegaly   Vulva: Mild atrophic changes at fourchette   Vagina: normal mucosa, atrophy in lower 1/3   Cervix: no lesions   Uterus: normal size, mobile, non-tender   Adnexa: normal adnexa and no mass, fullness, tenderness         "

## 2024-08-01 ENCOUNTER — AMB VIDEO VISIT (OUTPATIENT)
Dept: OTHER | Facility: HOSPITAL | Age: 54
End: 2024-08-01
Payer: COMMERCIAL

## 2024-08-01 DIAGNOSIS — R50.9 FEVER, UNSPECIFIED FEVER CAUSE: Primary | ICD-10-CM

## 2024-08-01 DIAGNOSIS — J06.9 VIRAL UPPER RESPIRATORY TRACT INFECTION: ICD-10-CM

## 2024-08-01 DIAGNOSIS — R05.9 COUGH, UNSPECIFIED TYPE: ICD-10-CM

## 2024-08-01 PROCEDURE — 99212 OFFICE O/P EST SF 10 MIN: CPT | Performed by: PHYSICIAN ASSISTANT

## 2024-08-01 NOTE — CARE ANYWHERE EVISITS
Visit Summary for SINCERE JONAS - Gender: Female - Date of Birth: 1970  Date: [id number] - Duration: 8 minutes  Patient: SINCERE JONAS  Provider: Linda Sellers PA-C    Patient Contact Information  Address  [address] [place] [address]  [phone]    Visit Topics    Triage Questions   What is your current physical address in the event of a medical emergency? Answer []  Are you allergic to any medications? Answer []  Are you now or could you be pregnant? Answer []  Do you have any immune system compromise or chronic lung   disease? Answer []  Do you have any vulnerable family members in the home (infant, pregnant, cancer, elderly)? Answer []     Conversation Transcripts  [0A][0A] [Notification] You are connected with Linda Sellers PA-C, Urgent Care Specialist.[0A][Notification] SINCERE JONAS is located in [place].[0A][Notification] SINCERE JONAS has shared health history...[0A]    Diagnosis  Fever, unspecified  Acute upper respiratory infection, unspecified    Procedures  Value: 19496 Code: CPT-4 UNLISTED E&M SERVICE    Medications Prescribed    No prescriptions ordered    Electronically signed by: Linda Sellers PA-C(NPI [id number])

## 2024-08-01 NOTE — PROGRESS NOTES
Required Documentation:  Encounter provider: Linda Sellers PA-C    Identify all parties in room with patient during virtual visit:  No one else    The patient was identified by name and date of birth. Leeanna Lacy was informed that this is a telemedicine visit and that the visit is being conducted through the [employer] platform. She agrees to proceed..  My office door was closed. No one else was in the room.  She acknowledged consent and understanding of privacy and security of the video platform. The patient has agreed to participate and understands they can discontinue the visit at any time.    Verification of patient location:  Patient is located at Home in the following state in which I hold an active license [place]    Patient is aware this is a billable service.     Reason for visit is No chief complaint on file.       Subjective  54 y.o. female presents for evaluation of 2 days of fever, dry cough, body aches, decreased appetite and dry cough. Has been taking advil and tylenol with minimal relief. Denies SOB, CP, HA, blurry vision, dizziness, fainting, known COVID or flu exposure.        Cx bv      Past Medical History:   Diagnosis Date    Abnormal weight gain     Allergic Child    Anxiety     Arthralgia     GERD (gastroesophageal reflux disease)     Panic attack     Stomatitis     last assessed 7/22/13; resolved 5/4/16       Past Surgical History:   Procedure Laterality Date    CHOLECYSTECTOMY      CHOLECYSTECTOMY LAPAROSCOPIC      WISDOM TOOTH EXTRACTION          Allergies   Allergen Reactions    Codeine Tachycardia    Penicillins Hives    Prednisone Irritability and Hyperactivity    Sulfa Antibiotics Hives    Tetracycline        Review of Systems   Constitutional:  Positive for appetite change, fatigue and fever.   HENT:  Positive for congestion, postnasal drip, sinus pressure, sinus pain and sore throat.    Respiratory:  Positive for cough. Negative for chest tightness, shortness of breath and  wheezing.    All other systems reviewed and are negative.      Video Exam    There were no vitals filed for this visit.    Physical Exam  Constitutional:       General: She is not in acute distress.     Appearance: Normal appearance. She is normal weight. She is ill-appearing. She is not toxic-appearing.   HENT:      Head: Normocephalic and atraumatic.   Eyes:      Extraocular Movements: Extraocular movements intact.      Conjunctiva/sclera: Conjunctivae normal.   Pulmonary:      Effort: Pulmonary effort is normal. No respiratory distress.      Breath sounds: No stridor. No wheezing.      Comments: No observable or audible signs of respiratory distress noted throughout video visit. No wheezing, nasal flaring, grunting, able to speak in full sentences,   Neurological:      Mental Status: She is alert and oriented to person, place, and time. Mental status is at baseline.   Psychiatric:         Mood and Affect: Mood normal.         Behavior: Behavior normal.         Thought Content: Thought content normal.         Judgment: Judgment normal.         Visit Time  Total Visit Duration: 10 minutes    Assessment/Plan:    Diagnoses and all orders for this visit:    Fever, unspecified fever cause  -     XR chest pa & lateral; Future  -     Covid/Flu- Lab Collect    Cough, unspecified type  -     XR chest pa & lateral; Future  -     Covid/Flu- Lab Collect    Viral upper respiratory tract infection  -     XR chest pa & lateral; Future  -     Covid/Flu- Lab Collect        Patient Instructions   Continue supportive care including plenty of fluids, small meals as tolerated, Tylenol/Motrin as needed for pain and fever, nasal spray such as flonase 2x daily as needed for congestion and post nasal drip, can take daily allergy medication as directed, multi-symptom cold and flu medication as needed. Obtain Flu/COVID swab and chest xray. Go to ER if development of worsening symptoms, development of shortness of breath, chest pain,  wheezing, heart racing, dizziness, blurry vision, or fainting.

## 2024-08-02 ENCOUNTER — APPOINTMENT (OUTPATIENT)
Dept: LAB | Facility: MEDICAL CENTER | Age: 54
End: 2024-08-02
Payer: COMMERCIAL

## 2024-08-02 ENCOUNTER — APPOINTMENT (OUTPATIENT)
Dept: RADIOLOGY | Facility: MEDICAL CENTER | Age: 54
End: 2024-08-02
Payer: COMMERCIAL

## 2024-08-02 DIAGNOSIS — J06.9 VIRAL UPPER RESPIRATORY TRACT INFECTION: ICD-10-CM

## 2024-08-02 DIAGNOSIS — R05.9 COUGH, UNSPECIFIED TYPE: ICD-10-CM

## 2024-08-02 DIAGNOSIS — R50.9 FEVER, UNSPECIFIED FEVER CAUSE: ICD-10-CM

## 2024-08-02 PROCEDURE — 87636 SARSCOV2 & INF A&B AMP PRB: CPT | Performed by: PHYSICIAN ASSISTANT

## 2024-08-02 PROCEDURE — 71046 X-RAY EXAM CHEST 2 VIEWS: CPT

## 2024-08-03 ENCOUNTER — APPOINTMENT (EMERGENCY)
Dept: CT IMAGING | Facility: HOSPITAL | Age: 54
DRG: 308 | End: 2024-08-03
Payer: COMMERCIAL

## 2024-08-03 ENCOUNTER — APPOINTMENT (EMERGENCY)
Dept: RADIOLOGY | Facility: HOSPITAL | Age: 54
DRG: 308 | End: 2024-08-03
Payer: COMMERCIAL

## 2024-08-03 ENCOUNTER — HOSPITAL ENCOUNTER (INPATIENT)
Facility: HOSPITAL | Age: 54
LOS: 1 days | Discharge: HOME | DRG: 308 | End: 2024-08-04
Attending: EMERGENCY MEDICINE | Admitting: INTERNAL MEDICINE
Payer: COMMERCIAL

## 2024-08-03 DIAGNOSIS — I48.91 ATRIAL FIBRILLATION WITH RVR (HCC): Primary | ICD-10-CM

## 2024-08-03 DIAGNOSIS — J18.9 PNEUMONIA: ICD-10-CM

## 2024-08-03 PROBLEM — E83.42 HYPOMAGNESEMIA: Status: ACTIVE | Noted: 2024-08-03

## 2024-08-03 LAB
2HR DELTA HS TROPONIN: 24 NG/L
4HR DELTA HS TROPONIN: 20 NG/L
ALBUMIN SERPL BCG-MCNC: 4.1 G/DL (ref 3.5–5)
ALP SERPL-CCNC: 52 U/L (ref 34–104)
ALT SERPL W P-5'-P-CCNC: 16 U/L (ref 7–52)
ANION GAP SERPL CALCULATED.3IONS-SCNC: 11 MMOL/L (ref 4–13)
APTT PPP: 32 SECONDS (ref 23–34)
AST SERPL W P-5'-P-CCNC: 18 U/L (ref 13–39)
BACTERIA UR QL AUTO: ABNORMAL /HPF
BASOPHILS # BLD AUTO: 0.04 THOUSANDS/ÂΜL (ref 0–0.1)
BASOPHILS NFR BLD AUTO: 0 % (ref 0–1)
BILIRUB SERPL-MCNC: 0.58 MG/DL (ref 0.2–1)
BILIRUB UR QL STRIP: NEGATIVE
BUN SERPL-MCNC: 9 MG/DL (ref 5–25)
CALCIUM SERPL-MCNC: 9.5 MG/DL (ref 8.4–10.2)
CARDIAC TROPONIN I PNL SERPL HS: 27 NG/L
CARDIAC TROPONIN I PNL SERPL HS: 31 NG/L
CARDIAC TROPONIN I PNL SERPL HS: 7 NG/L
CHLORIDE SERPL-SCNC: 103 MMOL/L (ref 96–108)
CLARITY UR: CLEAR
CO2 SERPL-SCNC: 24 MMOL/L (ref 21–32)
COLOR UR: COLORLESS
CREAT SERPL-MCNC: 0.58 MG/DL (ref 0.6–1.3)
D DIMER PPP FEU-MCNC: 1.17 UG/ML FEU
EOSINOPHIL # BLD AUTO: 0.11 THOUSAND/ÂΜL (ref 0–0.61)
EOSINOPHIL NFR BLD AUTO: 1 % (ref 0–6)
ERYTHROCYTE [DISTWIDTH] IN BLOOD BY AUTOMATED COUNT: 13.1 % (ref 11.6–15.1)
ERYTHROCYTE [DISTWIDTH] IN BLOOD BY AUTOMATED COUNT: 13.2 % (ref 11.6–15.1)
FLUAV RNA RESP QL NAA+PROBE: NEGATIVE
FLUAV RNA RESP QL NAA+PROBE: NEGATIVE
FLUBV RNA RESP QL NAA+PROBE: NEGATIVE
FLUBV RNA RESP QL NAA+PROBE: NEGATIVE
GFR SERPL CREATININE-BSD FRML MDRD: 104 ML/MIN/1.73SQ M
GLUCOSE SERPL-MCNC: 135 MG/DL (ref 65–140)
GLUCOSE UR STRIP-MCNC: NEGATIVE MG/DL
HCT VFR BLD AUTO: 41.2 % (ref 34.8–46.1)
HCT VFR BLD AUTO: 45.4 % (ref 34.8–46.1)
HGB BLD-MCNC: 13.9 G/DL (ref 11.5–15.4)
HGB BLD-MCNC: 15.1 G/DL (ref 11.5–15.4)
HGB UR QL STRIP.AUTO: ABNORMAL
IMM GRANULOCYTES # BLD AUTO: 0.05 THOUSAND/UL (ref 0–0.2)
IMM GRANULOCYTES NFR BLD AUTO: 1 % (ref 0–2)
INR PPP: 1.05 (ref 0.85–1.19)
KETONES UR STRIP-MCNC: ABNORMAL MG/DL
LEUKOCYTE ESTERASE UR QL STRIP: ABNORMAL
LIPASE SERPL-CCNC: 21 U/L (ref 11–82)
LYMPHOCYTES # BLD AUTO: 3.67 THOUSANDS/ÂΜL (ref 0.6–4.47)
LYMPHOCYTES NFR BLD AUTO: 34 % (ref 14–44)
MAGNESIUM SERPL-MCNC: 1.7 MG/DL (ref 1.9–2.7)
MCH RBC QN AUTO: 29.7 PG (ref 26.8–34.3)
MCH RBC QN AUTO: 30.3 PG (ref 26.8–34.3)
MCHC RBC AUTO-ENTMCNC: 33.3 G/DL (ref 31.4–37.4)
MCHC RBC AUTO-ENTMCNC: 33.7 G/DL (ref 31.4–37.4)
MCV RBC AUTO: 89 FL (ref 82–98)
MCV RBC AUTO: 90 FL (ref 82–98)
MONOCYTES # BLD AUTO: 1.11 THOUSAND/ÂΜL (ref 0.17–1.22)
MONOCYTES NFR BLD AUTO: 10 % (ref 4–12)
NEUTROPHILS # BLD AUTO: 5.68 THOUSANDS/ÂΜL (ref 1.85–7.62)
NEUTS SEG NFR BLD AUTO: 54 % (ref 43–75)
NITRITE UR QL STRIP: NEGATIVE
NON-SQ EPI CELLS URNS QL MICRO: ABNORMAL /HPF
NRBC BLD AUTO-RTO: 0 /100 WBCS
PH UR STRIP.AUTO: 6.5 [PH]
PLATELET # BLD AUTO: 179 THOUSANDS/UL (ref 149–390)
PLATELET # BLD AUTO: 181 THOUSANDS/UL (ref 149–390)
PMV BLD AUTO: 9.3 FL (ref 8.9–12.7)
PMV BLD AUTO: 9.5 FL (ref 8.9–12.7)
POTASSIUM SERPL-SCNC: 3.5 MMOL/L (ref 3.5–5.3)
PROCALCITONIN SERPL-MCNC: <0.05 NG/ML
PROT SERPL-MCNC: 8.1 G/DL (ref 6.4–8.4)
PROT UR STRIP-MCNC: ABNORMAL MG/DL
PROTHROMBIN TIME: 14.5 SECONDS (ref 12.3–15)
RBC # BLD AUTO: 4.59 MILLION/UL (ref 3.81–5.12)
RBC # BLD AUTO: 5.08 MILLION/UL (ref 3.81–5.12)
RBC #/AREA URNS AUTO: ABNORMAL /HPF
RSV RNA RESP QL NAA+PROBE: NEGATIVE
SARS-COV-2 RNA RESP QL NAA+PROBE: NEGATIVE
SARS-COV-2 RNA RESP QL NAA+PROBE: NEGATIVE
SODIUM SERPL-SCNC: 138 MMOL/L (ref 135–147)
SP GR UR STRIP.AUTO: 1 (ref 1–1.03)
TSH SERPL DL<=0.05 MIU/L-ACNC: 3.75 UIU/ML (ref 0.45–4.5)
UROBILINOGEN UR STRIP-ACNC: <2 MG/DL
WBC # BLD AUTO: 10.66 THOUSAND/UL (ref 4.31–10.16)
WBC # BLD AUTO: 9.95 THOUSAND/UL (ref 4.31–10.16)
WBC #/AREA URNS AUTO: ABNORMAL /HPF

## 2024-08-03 PROCEDURE — 71045 X-RAY EXAM CHEST 1 VIEW: CPT

## 2024-08-03 PROCEDURE — 85610 PROTHROMBIN TIME: CPT

## 2024-08-03 PROCEDURE — 96376 TX/PRO/DX INJ SAME DRUG ADON: CPT

## 2024-08-03 PROCEDURE — 96368 THER/DIAG CONCURRENT INF: CPT

## 2024-08-03 PROCEDURE — 83735 ASSAY OF MAGNESIUM: CPT

## 2024-08-03 PROCEDURE — 71275 CT ANGIOGRAPHY CHEST: CPT

## 2024-08-03 PROCEDURE — 99285 EMERGENCY DEPT VISIT HI MDM: CPT | Performed by: EMERGENCY MEDICINE

## 2024-08-03 PROCEDURE — 99254 IP/OBS CNSLTJ NEW/EST MOD 60: CPT | Performed by: INTERNAL MEDICINE

## 2024-08-03 PROCEDURE — 96365 THER/PROPH/DIAG IV INF INIT: CPT

## 2024-08-03 PROCEDURE — 84443 ASSAY THYROID STIM HORMONE: CPT

## 2024-08-03 PROCEDURE — 36415 COLL VENOUS BLD VENIPUNCTURE: CPT

## 2024-08-03 PROCEDURE — 84484 ASSAY OF TROPONIN QUANT: CPT

## 2024-08-03 PROCEDURE — 99285 EMERGENCY DEPT VISIT HI MDM: CPT

## 2024-08-03 PROCEDURE — 93005 ELECTROCARDIOGRAM TRACING: CPT

## 2024-08-03 PROCEDURE — 87040 BLOOD CULTURE FOR BACTERIA: CPT | Performed by: EMERGENCY MEDICINE

## 2024-08-03 PROCEDURE — 84145 PROCALCITONIN (PCT): CPT

## 2024-08-03 PROCEDURE — 0241U HB NFCT DS VIR RESP RNA 4 TRGT: CPT | Performed by: EMERGENCY MEDICINE

## 2024-08-03 PROCEDURE — 83690 ASSAY OF LIPASE: CPT

## 2024-08-03 PROCEDURE — 81001 URINALYSIS AUTO W/SCOPE: CPT

## 2024-08-03 PROCEDURE — 85025 COMPLETE CBC W/AUTO DIFF WBC: CPT

## 2024-08-03 PROCEDURE — 96366 THER/PROPH/DIAG IV INF ADDON: CPT

## 2024-08-03 PROCEDURE — 85027 COMPLETE CBC AUTOMATED: CPT

## 2024-08-03 PROCEDURE — 87086 URINE CULTURE/COLONY COUNT: CPT

## 2024-08-03 PROCEDURE — 85379 FIBRIN DEGRADATION QUANT: CPT

## 2024-08-03 PROCEDURE — 80053 COMPREHEN METABOLIC PANEL: CPT

## 2024-08-03 PROCEDURE — 85730 THROMBOPLASTIN TIME PARTIAL: CPT

## 2024-08-03 PROCEDURE — 99223 1ST HOSP IP/OBS HIGH 75: CPT | Performed by: INTERNAL MEDICINE

## 2024-08-03 PROCEDURE — 96375 TX/PRO/DX INJ NEW DRUG ADDON: CPT

## 2024-08-03 RX ORDER — LORAZEPAM 2 MG/ML
0.5 INJECTION INTRAMUSCULAR ONCE
Status: DISCONTINUED | OUTPATIENT
Start: 2024-08-03 | End: 2024-08-03

## 2024-08-03 RX ORDER — HEPARIN SODIUM 10000 [USP'U]/100ML
3-20 INJECTION, SOLUTION INTRAVENOUS
Status: DISCONTINUED | OUTPATIENT
Start: 2024-08-03 | End: 2024-08-03

## 2024-08-03 RX ORDER — PANTOPRAZOLE SODIUM 20 MG/1
20 TABLET, DELAYED RELEASE ORAL
Status: DISCONTINUED | OUTPATIENT
Start: 2024-08-03 | End: 2024-08-04 | Stop reason: HOSPADM

## 2024-08-03 RX ORDER — METOPROLOL TARTRATE 1 MG/ML
2.5 INJECTION, SOLUTION INTRAVENOUS EVERY 6 HOURS PRN
Status: DISCONTINUED | OUTPATIENT
Start: 2024-08-03 | End: 2024-08-04 | Stop reason: HOSPADM

## 2024-08-03 RX ORDER — IBUPROFEN 600 MG/1
600 TABLET ORAL EVERY 6 HOURS PRN
Status: DISCONTINUED | OUTPATIENT
Start: 2024-08-03 | End: 2024-08-04 | Stop reason: HOSPADM

## 2024-08-03 RX ORDER — ACETAMINOPHEN 325 MG/1
650 TABLET ORAL EVERY 6 HOURS PRN
Status: DISCONTINUED | OUTPATIENT
Start: 2024-08-03 | End: 2024-08-04 | Stop reason: HOSPADM

## 2024-08-03 RX ORDER — LEVALBUTEROL INHALATION SOLUTION 1.25 MG/3ML
1.25 SOLUTION RESPIRATORY (INHALATION) EVERY 6 HOURS PRN
Status: DISCONTINUED | OUTPATIENT
Start: 2024-08-03 | End: 2024-08-04 | Stop reason: HOSPADM

## 2024-08-03 RX ORDER — MAGNESIUM SULFATE HEPTAHYDRATE 40 MG/ML
2 INJECTION, SOLUTION INTRAVENOUS ONCE
Status: COMPLETED | OUTPATIENT
Start: 2024-08-03 | End: 2024-08-03

## 2024-08-03 RX ORDER — LEVALBUTEROL INHALATION SOLUTION 0.63 MG/3ML
0.31 SOLUTION RESPIRATORY (INHALATION) EVERY 6 HOURS PRN
Status: DISCONTINUED | OUTPATIENT
Start: 2024-08-03 | End: 2024-08-03

## 2024-08-03 RX ORDER — ACETAMINOPHEN 325 MG/1
975 TABLET ORAL ONCE
Status: COMPLETED | OUTPATIENT
Start: 2024-08-03 | End: 2024-08-03

## 2024-08-03 RX ORDER — SERTRALINE HYDROCHLORIDE 25 MG/1
25 TABLET, FILM COATED ORAL DAILY
Status: DISCONTINUED | OUTPATIENT
Start: 2024-08-03 | End: 2024-08-04 | Stop reason: HOSPADM

## 2024-08-03 RX ORDER — GUAIFENESIN/DEXTROMETHORPHAN 100-10MG/5
10 SYRUP ORAL EVERY 4 HOURS PRN
Status: DISCONTINUED | OUTPATIENT
Start: 2024-08-03 | End: 2024-08-04 | Stop reason: HOSPADM

## 2024-08-03 RX ORDER — LORAZEPAM 2 MG/ML
0.5 INJECTION INTRAMUSCULAR ONCE
Status: COMPLETED | OUTPATIENT
Start: 2024-08-03 | End: 2024-08-03

## 2024-08-03 RX ORDER — HEPARIN SODIUM 1000 [USP'U]/ML
2000 INJECTION, SOLUTION INTRAVENOUS; SUBCUTANEOUS EVERY 6 HOURS PRN
Status: DISCONTINUED | OUTPATIENT
Start: 2024-08-03 | End: 2024-08-03

## 2024-08-03 RX ORDER — LANOLIN ALCOHOL/MO/W.PET/CERES
800 CREAM (GRAM) TOPICAL ONCE
Status: COMPLETED | OUTPATIENT
Start: 2024-08-03 | End: 2024-08-03

## 2024-08-03 RX ORDER — LORAZEPAM 2 MG/ML
0.5 INJECTION INTRAMUSCULAR EVERY 6 HOURS PRN
Status: DISCONTINUED | OUTPATIENT
Start: 2024-08-03 | End: 2024-08-03

## 2024-08-03 RX ORDER — HEPARIN SODIUM 1000 [USP'U]/ML
4000 INJECTION, SOLUTION INTRAVENOUS; SUBCUTANEOUS EVERY 6 HOURS PRN
Status: DISCONTINUED | OUTPATIENT
Start: 2024-08-03 | End: 2024-08-03

## 2024-08-03 RX ORDER — DILTIAZEM HYDROCHLORIDE 5 MG/ML
25 INJECTION INTRAVENOUS ONCE
Status: COMPLETED | OUTPATIENT
Start: 2024-08-03 | End: 2024-08-03

## 2024-08-03 RX ORDER — DILTIAZEM HYDROCHLORIDE 240 MG/1
240 CAPSULE, COATED, EXTENDED RELEASE ORAL DAILY
Status: DISCONTINUED | OUTPATIENT
Start: 2024-08-03 | End: 2024-08-04 | Stop reason: HOSPADM

## 2024-08-03 RX ORDER — LORAZEPAM 0.5 MG/1
0.5 TABLET ORAL EVERY 6 HOURS PRN
Status: DISCONTINUED | OUTPATIENT
Start: 2024-08-03 | End: 2024-08-04 | Stop reason: HOSPADM

## 2024-08-03 RX ORDER — MAGNESIUM SULFATE HEPTAHYDRATE 40 MG/ML
2 INJECTION, SOLUTION INTRAVENOUS ONCE
Status: DISCONTINUED | OUTPATIENT
Start: 2024-08-03 | End: 2024-08-03

## 2024-08-03 RX ORDER — HEPARIN SODIUM 1000 [USP'U]/ML
4000 INJECTION, SOLUTION INTRAVENOUS; SUBCUTANEOUS ONCE
Status: COMPLETED | OUTPATIENT
Start: 2024-08-03 | End: 2024-08-03

## 2024-08-03 RX ORDER — DILTIAZEM HYDROCHLORIDE 5 MG/ML
15 INJECTION INTRAVENOUS ONCE
Status: COMPLETED | OUTPATIENT
Start: 2024-08-03 | End: 2024-08-03

## 2024-08-03 RX ORDER — ENOXAPARIN SODIUM 100 MG/ML
40 INJECTION SUBCUTANEOUS
Status: DISCONTINUED | OUTPATIENT
Start: 2024-08-03 | End: 2024-08-04 | Stop reason: HOSPADM

## 2024-08-03 RX ADMIN — ENOXAPARIN SODIUM 40 MG: 40 INJECTION SUBCUTANEOUS at 14:19

## 2024-08-03 RX ADMIN — DILTIAZEM HYDROCHLORIDE 10 MG/HR: 5 INJECTION INTRAVENOUS at 02:32

## 2024-08-03 RX ADMIN — PANTOPRAZOLE SODIUM 20 MG: 20 TABLET, DELAYED RELEASE ORAL at 06:39

## 2024-08-03 RX ADMIN — HEPARIN SODIUM 11.1 UNITS/KG/HR: 10000 INJECTION, SOLUTION INTRAVENOUS at 06:59

## 2024-08-03 RX ADMIN — DEXTROSE 1000 MG: 50 INJECTION, SOLUTION INTRAVENOUS at 04:23

## 2024-08-03 RX ADMIN — DILTIAZEM HYDROCHLORIDE 10 MG/HR: 5 INJECTION INTRAVENOUS at 10:10

## 2024-08-03 RX ADMIN — ACETAMINOPHEN 975 MG: 325 TABLET, FILM COATED ORAL at 05:47

## 2024-08-03 RX ADMIN — GUAIFENESIN AND DEXTROMETHORPHAN 10 ML: 100; 10 SYRUP ORAL at 19:55

## 2024-08-03 RX ADMIN — DILTIAZEM HYDROCHLORIDE 15 MG: 5 INJECTION, SOLUTION INTRAVENOUS at 02:09

## 2024-08-03 RX ADMIN — DILTIAZEM HYDROCHLORIDE 240 MG: 240 CAPSULE, COATED, EXTENDED RELEASE ORAL at 14:19

## 2024-08-03 RX ADMIN — ACETAMINOPHEN 650 MG: 325 TABLET, FILM COATED ORAL at 19:55

## 2024-08-03 RX ADMIN — SERTRALINE HYDROCHLORIDE 50 MG: 50 TABLET ORAL at 06:17

## 2024-08-03 RX ADMIN — HEPARIN SODIUM 4000 UNITS: 1000 INJECTION INTRAVENOUS; SUBCUTANEOUS at 07:00

## 2024-08-03 RX ADMIN — DILTIAZEM HYDROCHLORIDE 12.5 MG/HR: 5 INJECTION INTRAVENOUS at 08:41

## 2024-08-03 RX ADMIN — LORAZEPAM 0.5 MG: 2 INJECTION INTRAMUSCULAR; INTRAVENOUS at 06:39

## 2024-08-03 RX ADMIN — LORAZEPAM 0.5 MG: 0.5 TABLET ORAL at 20:38

## 2024-08-03 RX ADMIN — IOHEXOL 70 ML: 350 INJECTION, SOLUTION INTRAVENOUS at 03:40

## 2024-08-03 RX ADMIN — DILTIAZEM HYDROCHLORIDE 15 MG/HR: 5 INJECTION INTRAVENOUS at 06:29

## 2024-08-03 RX ADMIN — ACETAMINOPHEN 650 MG: 325 TABLET, FILM COATED ORAL at 14:27

## 2024-08-03 RX ADMIN — Medication 800 MG: at 08:43

## 2024-08-03 RX ADMIN — MAGNESIUM SULFATE HEPTAHYDRATE 2 G: 40 INJECTION, SOLUTION INTRAVENOUS at 11:29

## 2024-08-03 RX ADMIN — DILTIAZEM HYDROCHLORIDE 25 MG: 5 INJECTION, SOLUTION INTRAVENOUS at 02:18

## 2024-08-03 RX ADMIN — AZITHROMYCIN MONOHYDRATE 500 MG: 500 INJECTION, POWDER, LYOPHILIZED, FOR SOLUTION INTRAVENOUS at 05:47

## 2024-08-03 RX ADMIN — SODIUM CHLORIDE 1000 ML: 0.9 INJECTION, SOLUTION INTRAVENOUS at 02:29

## 2024-08-03 NOTE — ASSESSMENT & PLAN NOTE
"Symptoms: chills without rigors, cough, fever to 102, headache, and sore throat patient endorses for roughly last week she has had above listed symptoms.  Patient has sick contact inform of stepfather and family function roughly 1 week ago, recently seen and evaluated in the outpatient setting with negative flu RSV and chest x-ray findings.    Lab Results   Component Value Date    SARSCOV2 Negative 08/03/2024       CTA ED chest PE study    Result Date: 8/3/2024  Impression: No pulmonary embolus. Multi lobar infiltrates attributed to pneumonia, most prominent in the left lower lobe. Trace bilateral pleural effusions, left greater than right. The study was marked in EPIC for immediate notification. Workstation performed: [id number]       Recent Labs     08/03/24  0220   WBC 10.66*     No results for input(s): \"PROCALCITONI\" in the last 72 hours.     Plan:  Influenza A/B and RSV, Urine Strep, Legionella, Sputum cultures, Procalcitonin tomorrow a.m.  CBC with Diff Tomorrow AM  Ceftriaxone 1 g IV q. 24 hours  Azithromycin 500 mg every 24 hour IV added for patchy infiltrates  Robitussin as needed available to patient for mucolytic and antitussive purposes  Respiratory protocol  Incentive spirometry  Xopenex every 6 hours as needed for episodes of wheezing, avoid albuterol for concomitant tachycardia  Blood cultures taken, follow for result  Procalcitonin added on to ED labs, follow for result    "

## 2024-08-03 NOTE — H&P
"[facility]  H&P  Name: Leeanna Lacy 54 y.o. female I MRN: [id number]  Unit/Bed#: ED-05 I Date of Admission: 8/3/2024   Date of Service: 8/3/2024 I Hospital Day: 0      Assessment & Plan   * Atrial fibrillation with RVR (HCC)  Assessment & Plan  Patient endorses upper respiratory infection for past week with episodic palpitations throughout that time.  Patient states on morning of admission she felt like her heart was \"racing\", which caused her to present to the ED for assessment.    In the ED, patient was found to be in A-fib with RVR with rates 170-190.  Patient was administered Cardizem bolus x 2 with improvement and placed on Cardizem drip for further rate control.  Assessment in the ED demonstrates stable blood pressure, without overt subjective symptoms of RVR.    Plan:  Cardiology consulted, appreciate recommendations  Continue with IV Cardizem for goal heart rate below 120  If refractory to IV Cardizem, Lopressor 2.5 mg as needed available for additional heart rate control  Heparin drip, ACS low protocol  Continue to monitor on telemetry  Continue to monitor hemodynamics    Pneumonia involving left lung  Assessment & Plan  Symptoms: chills without rigors, cough, fever to 102, headache, and sore throat patient endorses for roughly last week she has had above listed symptoms.  Patient has sick contact inform of stepfather and family function roughly 1 week ago, recently seen and evaluated in the outpatient setting with negative flu RSV and chest x-ray findings.    Lab Results   Component Value Date    SARSCOV2 Negative 08/03/2024       CTA ED chest PE study    Result Date: 8/3/2024  Impression: No pulmonary embolus. Multi lobar infiltrates attributed to pneumonia, most prominent in the left lower lobe. Trace bilateral pleural effusions, left greater than right. The study was marked in EPIC for immediate notification. Workstation performed: [id number]       Recent Labs     " "08/03/24  0220   WBC 10.66*     No results for input(s): \"PROCALCITONI\" in the last 72 hours.     Plan:  Influenza A/B and RSV, Urine Strep, Legionella, Sputum cultures, Procalcitonin tomorrow a.m.  CBC with Diff Tomorrow AM  Ceftriaxone 1 g IV q. 24 hours  Azithromycin 500 mg every 24 hour IV added for patchy infiltrates  Robitussin as needed available to patient for mucolytic and antitussive purposes  Respiratory protocol  Incentive spirometry  Xopenex every 6 hours as needed for episodes of wheezing, avoid albuterol for concomitant tachycardia  Blood cultures taken, follow for result  Procalcitonin added on to ED labs, follow for result      Generalized anxiety disorder  Assessment & Plan  Continue home sertraline 25 mg  As needed Ativan 0.5 mg every 6 hours available the patient, would anticipate tight control of anxiety for improvement of heart rate         VTE Pharmacologic Prophylaxis: VTE Score: 3 Moderate Risk (Score 3-4) - Pharmacological DVT Prophylaxis Ordered: heparin drip.  Code Status: Level 1 - Full Code   Discussion with family: Updated  () at bedside.    Anticipated Length of Stay: Patient will be admitted on an inpatient basis with an anticipated length of stay of greater than 2 midnights secondary to afib with RVR.    Chief Complaint: palpitations     History of Present Illness:  Leeanna Lacy is a 54 y.o. female with a PMH of anxiety who presents complaining of palpitations.  Patient states roughly the last week she has been experiencing URI-like symptoms consisting of chills, cough, malaise, fevers with Tmax 102 at home.  Patient states that she was managing this with Tylenol and Advil and had recently attended a telehealth appointment where she had been prescribed flu and COVID test which were negative.  Patient states on morning of presentation to hospital roughly 0 100 she was awoken by sensation of heart palpitations.  Patient presented to the ED for assessment.    In " ED, vitals were assessed and patient was found to be in A-fib with RVR with max heart rate of 90.  Temperature within normal limits.  Respiratory rate within normal limits.  Blood pressure initially elevated to 200s over 140s but improved with Cardizem drip to within normal limits.  Laboratory studies demonstrated mild leukocytosis of 10.6, UA with trace ketones, small blood, trace protein, trace WBCs, flu COVID RSV negative, TSH within normal limits, troponin 2-hour delta of 24.  CTA PE study demonstrated left lung multilobar infiltrate suspicious for pneumonia.  Following this patient was admitted to [facility] service for management of A-fib with RVR in the setting of pneumonia.    Review of Systems:  Review of Systems   Constitutional:  Positive for fever. Negative for chills.   HENT:  Negative for ear pain and sore throat.    Eyes:  Negative for pain and visual disturbance.   Respiratory:  Positive for cough and wheezing. Negative for shortness of breath.    Cardiovascular:  Positive for palpitations. Negative for chest pain and leg swelling.   Gastrointestinal:  Positive for diarrhea. Negative for abdominal pain, constipation, nausea and vomiting.   Genitourinary:  Negative for dysuria and hematuria.   Musculoskeletal:  Negative for arthralgias and back pain.   Skin:  Negative for color change and rash.   Neurological:  Negative for seizures and syncope.   Hematological:  Negative for adenopathy.   Psychiatric/Behavioral:  The patient is nervous/anxious.    All other systems reviewed and are negative.      Past Medical and Surgical History:   Past Medical History:   Diagnosis Date    Abnormal weight gain     Allergic Child    Anxiety     Arthralgia     GERD (gastroesophageal reflux disease)     Panic attack     Stomatitis     last assessed 7/22/13; resolved 5/4/16       Past Surgical History:   Procedure Laterality Date    CHOLECYSTECTOMY      CHOLECYSTECTOMY LAPAROSCOPIC      WISDOM TOOTH EXTRACTION          Meds/Allergies:  Prior to Admission medications    Medication Sig Start Date End Date Taking? Authorizing Provider   calcium carbonate (OS-ERICKA) 600 MG tablet Take 1 tablet (600 mg total) by mouth 2 (two) times a day with meals 3/8/22   Devora Bundy PA-C   esomeprazole (NexIUM) 20 mg packet 20 mg 6/12/24   Historical Provider, MD   estradiol (ESTRACE VAGINAL) 0.1 mg/g vaginal cream Insert 1 g into the vagina daily for 14 days, THEN 1 g 2 (two) times a week. 6/18/24 9/30/24  Laquita Shah MD   fluocinolone acetonide (DermOtic) 0.01 % otic oil Administer 5 drops into both ears 2 (two) times a day 11/28/23   Chanel Barney MD   multivitamin (THERAGRAN) TABS Take 1 tablet by mouth daily    Historical Provider, MD   sertraline (ZOLOFT) 50 mg tablet Take 1 tablet (50 mg total) by mouth daily  Patient taking differently: Take 25 mg by mouth daily 9/28/23 9/22/24  Devora Bundy PA-C   dicyclomine (BENTYL) 20 mg tablet Take 1 tablet (20 mg total) by mouth every 6 (six) hours 5/31/24 8/3/24  Devora Bundy PA-C     I have reviewed home medications with patient personally.    Allergies:   Allergies   Allergen Reactions    Codeine Tachycardia    Penicillins Hives    Prednisone Irritability and Hyperactivity    Sulfa Antibiotics Hives    Tetracycline        Social History:  Marital Status: /Civil Union   Occupation: Employee  Patient Pre-hospital Living Situation: Home, With spouse  Patient Pre-hospital Level of Mobility: walks  Patient Pre-hospital Diet Restrictions: None  Substance Use History:   Social History     Substance and Sexual Activity   Alcohol Use Yes    Alcohol/week: 1.0 standard drink of alcohol    Types: 1 Glasses of wine per week    Comment: Social     Social History     Tobacco Use   Smoking Status Never   Smokeless Tobacco Never     Social History     Substance and Sexual Activity   Drug Use No       Family History:  Family History   Problem Relation Age of Onset  "   Diabetes Mother     Breast cancer Mother 74    Asthma Mother     Hypertension Mother     Heart failure Father     Coronary artery disease Father     Diabetes Father             Other Father         pure hypercholesterolemia    No Known Problems Daughter     No Known Problems Daughter     Pancreatic cancer Paternal Grandmother     No Known Problems Maternal Aunt     No Known Problems Maternal Aunt     Breast cancer Paternal Aunt     Cancer Family     Mental illness Neg Hx     Substance Abuse Neg Hx     Colon cancer Neg Hx     Ovarian cancer Neg Hx     Endometrial cancer Neg Hx        Physical Exam:     Vitals:   Blood Pressure: 131/61 (24 0530)  Pulse: (!) 141 (24 0530)  Temperature: 98.3 °F (36.8 °C) (24 0500)  Temp Source: Oral (24 0500)  Respirations: 19 (24 0530)  Height: 5' 4\" (162.6 cm) (24 0148)  Weight - Scale: 95.3 kg (210 lb) (24 0148)  SpO2: 98 % (24 0530)    Physical Exam  Vitals and nursing note reviewed.   Constitutional:       General: She is not in acute distress.     Appearance: She is well-developed. She is ill-appearing.   HENT:      Head: Normocephalic and atraumatic.      Right Ear: External ear normal.      Left Ear: External ear normal.      Mouth/Throat:      Mouth: Mucous membranes are moist.      Pharynx: Oropharynx is clear.   Eyes:      General: No scleral icterus.     Extraocular Movements: Extraocular movements intact.      Conjunctiva/sclera: Conjunctivae normal.   Cardiovascular:      Rate and Rhythm: Tachycardia present. Rhythm irregular.      Heart sounds: No murmur heard.  Pulmonary:      Effort: Pulmonary effort is normal. No respiratory distress.      Breath sounds: Rhonchi present. No wheezing or rales.      Comments: Expiratory rhonchi, left greater than right  Abdominal:      Palpations: Abdomen is soft.      Tenderness: There is no abdominal tenderness. There is no guarding.      Hernia: No hernia is present. "   Musculoskeletal:         General: No swelling.      Cervical back: Neck supple.      Right lower leg: No edema.      Left lower leg: No edema.   Skin:     General: Skin is warm and dry.      Capillary Refill: Capillary refill takes less than 2 seconds.   Neurological:      Mental Status: She is alert. Mental status is at baseline.   Psychiatric:         Mood and Affect: Mood normal.      Comments: Anxious affect         Additional Data:     Lab Results:  Results from last 7 days   Lab Units 08/03/24  0220   WBC Thousand/uL 10.66*   HEMOGLOBIN g/dL 15.1   HEMATOCRIT % 45.4   PLATELETS Thousands/uL 179   SEGS PCT % 54   LYMPHO PCT % 34   MONO PCT % 10   EOS PCT % 1     Results from last 7 days   Lab Units 08/03/24  0220   SODIUM mmol/L 138   POTASSIUM mmol/L 3.5   CHLORIDE mmol/L 103   CO2 mmol/L 24   BUN mg/dL 9   CREATININE mg/dL 0.58*   ANION GAP mmol/L 11   CALCIUM mg/dL 9.5   ALBUMIN g/dL 4.1   TOTAL BILIRUBIN mg/dL 0.58   ALK PHOS U/L 52   ALT U/L 16   AST U/L 18   GLUCOSE RANDOM mg/dL 135             Lab Results   Component Value Date    HGBA1C 6.0 (H) 08/25/2023    HGBA1C 6.1 (H) 08/20/2022    HGBA1C 6.0 (H) 07/09/2021           Lines/Drains:  Invasive Devices       Peripheral Intravenous Line  Duration             Peripheral IV 08/03/24 Left Antecubital <1 day    Peripheral IV 08/03/24 Right Antecubital <1 day                        Imaging: Reviewed radiology reports from this admission including: chest CT scan and abdominal/pelvic CT  CTA ED chest PE study   Final Result by Ludin Villa MD (08/03 4063)      No pulmonary embolus.      Multi lobar infiltrates attributed to pneumonia, most prominent in the left lower lobe.      Trace bilateral pleural effusions, left greater than right.      The study was marked in EPIC for immediate notification.                  Workstation performed: [id number]         XR chest 1 view portable   ED Interpretation by Josh Jacobsen DO (08/03 9158)   Compared to  previous, worsening diffuse opacity in R lung          EKG and Other Studies Reviewed on Admission:   EKG: Personally Reviewed. Atrial fibrillation. .    ** Please Note: This note has been constructed using a voice recognition system. **

## 2024-08-03 NOTE — ASSESSMENT & PLAN NOTE
"Patient endorses upper respiratory infection for past week with episodic palpitations throughout that time.  Patient states on morning of admission she felt like her heart was \"racing\", which caused her to present to the ED for assessment.    In the ED, patient was found to be in A-fib with RVR with rates 170-190.  Patient was administered Cardizem bolus x 2 with improvement and placed on Cardizem drip for further rate control.  Assessment in the ED demonstrates stable blood pressure, without overt subjective symptoms of RVR.    Plan:  Cardiology consulted, appreciate recommendations  Continue with IV Cardizem for goal heart rate below 120  If refractory to IV Cardizem, Lopressor 2.5 mg as needed available for additional heart rate control  Heparin drip, ACS low protocol  Continue to monitor on telemetry  Continue to monitor hemodynamics  A.m. echo ordered  "

## 2024-08-03 NOTE — QUICK NOTE
Patient was evaluated at bedside this morning.  No signs of acute distress.  She reports that she is feeling improved from her initial presentation, noting that she no longer feels palpitations.  Denies any chest pain, shortness of breath, headaches, visual changes, lightheadedness, or dizziness. No shortness of breath, although she does not congestion that has been present over the last week.    Cardio exam findings: heart RRR, no murmur appreciated  Lungs: Expiratory rhonchi throughout. No wheezing. No rales.   Abdomen: soft, non-tender    Plan as in noted in H+P with addition of sputum culture and additional 2 g of IV mag sulfate.

## 2024-08-03 NOTE — ED ATTENDING ATTESTATION
8/3/2024  I, Rianna Echavarria MD, saw and evaluated the patient. I have discussed the patient with the resident/non-physician practitioner and agree with the resident's/non-physician practitioner's findings, Plan of Care, and MDM as documented in the resident's/non-physician practitioner's note, except where noted. All available labs and Radiology studies were reviewed.  I was present for key portions of any procedure(s) performed by the resident/non-physician practitioner and I was immediately available to provide assistance.       At this point I agree with the current assessment done in the Emergency Department.  I have conducted an independent evaluation of this patient a history and physical is as follows:      55 yo female with h/o anxiety, GERD p/w new onset A-fib and PNA. Reports one week of URI symptoms, cough and fever, outpatient xray with PNA. Had sudden onset palpitations at 1 am. No h/o prior. Initial EKG with 's. A-fib with RVR. Pt initially with significant improvement with dilt. Labs reassuring. CTA done for elevated D-dimer. Study negative for PE but notable for multifocal PNA. HR creeping up again despite dilt gtt, pt noted to be febrile. Pt received abx and fluids.  BP stable throughout.  Admitted to medicine for further work up and evaluation.        Past Medical History:   Diagnosis Date    Abnormal weight gain     Allergic Child    Anxiety     Arthralgia     GERD (gastroesophageal reflux disease)     Panic attack     Stomatitis     last assessed 7/22/13; resolved 5/4/16         ED Course  ED Course as of 08/03/24 0551   Sat Aug 03, 2024   0226 Improved rate after bolus x 2 cardizem for A-fib with RVR./  No murmur, no pulse differential, no volume overload.  No clinical signs of Vte.  Will check labs, give fluid bolus, likely will need cardizem gtt and re-eval.    0345 FLU/RSV/COVID - if FLU/RSV clinically relevant  Negative viral panel   0435 Delta 2hr hsTnI(!): 24  Delta 24, will repeat  EKG, rate improved.  No active chest pain/pressure.  Persistent palpitations.  Elevation likely 2/2 demand from elevated rate.  CTA pending to r/o PE.     0549 CTA ED chest PE study  IMPRESSION:     No pulmonary embolus.     Multi lobar infiltrates attributed to pneumonia, most prominent in the left lower lobe.     Trace bilateral pleural effusions, left greater than right.     The study was marked in EPIC for immediate notification.                 Workstation performed: [id number]           Critical Care Time  Procedures

## 2024-08-03 NOTE — ED PROVIDER NOTES
History  Chief Complaint   Patient presents with    Palpitations     Patient arrived through triage with  for evaluation of palpitations that woke her up. Pt reports fatigue, body aches, headache since Monday. Last dose of Tylenol 1g , advil .     54-year-old female with no significant past cardiac history presents for sudden onset palpitations beginning at 0100 this morning.  Patient states that she was resting in bed and was awoken by heart palpitations. Denies any past history of ACS, A-fib, heart arrhythmia, CHF.  States that she has had viral illness and has been treating with Tylenol and Robitussin for 1 week.  At present patient feels anxious but denies any headaches, dizziness, chest pain, shortness of breath, abdominal pain, nausea, vomiting, diarrhea, dysuria, hematuria. Patient anxious about evaluation.          Prior to Admission Medications   Prescriptions Last Dose Informant Patient Reported? Taking?   calcium carbonate (OS-ERICKA) 600 MG tablet More than a month Self No No   Sig: Take 1 tablet (600 mg total) by mouth 2 (two) times a day with meals   esomeprazole (NexIUM) 20 mg packet 2024 Self Yes Yes   Si mg   estradiol (ESTRACE VAGINAL) 0.1 mg/g vaginal cream More than a month  No No   Sig: Insert 1 g into the vagina daily for 14 days, THEN 1 g 2 (two) times a week.   fluocinolone acetonide (DermOtic) 0.01 % otic oil Not Taking Self No No   Sig: Administer 5 drops into both ears 2 (two) times a day   Patient not taking: Reported on 8/3/2024   multivitamin (THERAGRAN) TABS More than a month Self Yes No   Sig: Take 1 tablet by mouth daily   sertraline (ZOLOFT) 50 mg tablet 2024 Self No Yes   Sig: Take 1 tablet (50 mg total) by mouth daily   Patient taking differently: Take 25 mg by mouth daily      Facility-Administered Medications: None       Past Medical History:   Diagnosis Date    Abnormal weight gain     Allergic Child    Anxiety     Arthralgia     GERD (gastroesophageal  reflux disease)     Panic attack     Stomatitis     last assessed 13; resolved 16       Past Surgical History:   Procedure Laterality Date    CHOLECYSTECTOMY      CHOLECYSTECTOMY LAPAROSCOPIC      WISDOM TOOTH EXTRACTION         Family History   Problem Relation Age of Onset    Diabetes Mother     Breast cancer Mother 74    Asthma Mother     Hypertension Mother     Heart failure Father     Coronary artery disease Father     Diabetes Father             Other Father         pure hypercholesterolemia    No Known Problems Daughter     No Known Problems Daughter     Pancreatic cancer Paternal Grandmother     No Known Problems Maternal Aunt     No Known Problems Maternal Aunt     Breast cancer Paternal Aunt     Cancer Family     Mental illness Neg Hx     Substance Abuse Neg Hx     Colon cancer Neg Hx     Ovarian cancer Neg Hx     Endometrial cancer Neg Hx      I have reviewed and agree with the history as documented.    E-Cigarette/Vaping    E-Cigarette Use Never User      E-Cigarette/Vaping Substances    Nicotine No     THC No     CBD No     Flavoring No     Other No     Unknown No      Social History     Tobacco Use    Smoking status: Never    Smokeless tobacco: Never   Vaping Use    Vaping status: Never Used   Substance Use Topics    Alcohol use: Yes     Alcohol/week: 1.0 standard drink of alcohol     Types: 1 Glasses of wine per week     Comment: Social    Drug use: No        Review of Systems   Cardiovascular:  Positive for palpitations.   Neurological:  Positive for dizziness.   All other systems reviewed and are negative.      Physical Exam  ED Triage Vitals [24 0148]   Temperature Pulse Respirations Blood Pressure SpO2   98.8 °F (37.1 °C) (!) 156 21 (!) 217/93 97 %      Temp Source Heart Rate Source Patient Position - Orthostatic VS BP Location FiO2 (%)   Oral Monitor Sitting Right arm --      Pain Score       No Pain             Orthostatic Vital Signs  Vitals:    24 0430 24  0500 08/03/24 0530 08/03/24 0747   BP: 147/78 123/58 131/61 133/81   Pulse: (!) 128 (!) 152 (!) 141 90   Patient Position - Orthostatic VS: Sitting Lying Lying Sitting       Physical Exam  Constitutional:       General: She is not in acute distress.     Appearance: Normal appearance. She is not ill-appearing or diaphoretic.   HENT:      Head: Normocephalic and atraumatic.   Eyes:      Extraocular Movements: Extraocular movements intact.      Conjunctiva/sclera: Conjunctivae normal.      Pupils: Pupils are equal, round, and reactive to light.   Cardiovascular:      Rate and Rhythm: Tachycardia present. Rhythm irregular.      Pulses: Normal pulses.      Heart sounds: Normal heart sounds. No murmur heard.     No friction rub. No gallop.   Pulmonary:      Effort: Pulmonary effort is normal. No respiratory distress.      Breath sounds: Normal breath sounds. No stridor. No wheezing, rhonchi or rales.   Abdominal:      General: Abdomen is flat. Bowel sounds are normal. There is no distension.      Palpations: Abdomen is soft.      Tenderness: There is no abdominal tenderness. There is no guarding or rebound.   Musculoskeletal:      Cervical back: Normal range of motion and neck supple.   Skin:     General: Skin is warm and dry.      Capillary Refill: Capillary refill takes less than 2 seconds.   Neurological:      General: No focal deficit present.      Mental Status: She is alert and oriented to person, place, and time. Mental status is at baseline.      Cranial Nerves: No cranial nerve deficit.      Sensory: No sensory deficit.      Motor: No weakness.      Coordination: Coordination normal.   Psychiatric:         Mood and Affect: Mood normal.         Behavior: Behavior normal.         ED Medications  Medications   pantoprazole (PROTONIX) EC tablet 20 mg (20 mg Oral Given 8/3/24 0639)   sertraline (ZOLOFT) tablet 25 mg (25 mg Oral Not Given 8/3/24 0843)   acetaminophen (TYLENOL) tablet 650 mg (has no administration in  time range)   ibuprofen (MOTRIN) tablet 600 mg (has no administration in time range)   ceftriaxone (ROCEPHIN) 1 g/50 mL in dextrose IVPB (has no administration in time range)   azithromycin (ZITHROMAX) 500 mg in sodium chloride 0.9% 250mL IVPB 500 mg (has no administration in time range)   heparin (porcine) 25,000 units in 0.45% NaCl 250 mL infusion (premix) (11.1 Units/kg/hr × 90 kg (Order-Specific) Intravenous New Bag 8/3/24 0659)   heparin (porcine) injection 4,000 Units (has no administration in time range)   heparin (porcine) injection 2,000 Units (has no administration in time range)   metoprolol (LOPRESSOR) injection 2.5 mg (has no administration in time range)   diltiazem (CARDIZEM) 125 mg in sodium chloride 0.9 % 125 mL infusion (13.5 mg/hr Intravenous New Bag 8/3/24 0841)   LORazepam (ATIVAN) injection 0.5 mg (has no administration in time range)   dextromethorphan-guaiFENesin (ROBITUSSIN DM) oral syrup 10 mL (has no administration in time range)   levalbuterol (XOPENEX) inhalation solution 0.31 mg (has no administration in time range)   diltiazem (CARDIZEM) injection 15 mg (15 mg Intravenous Given 8/3/24 0209)   diltiazem (CARDIZEM) 125 mg in sodium chloride 0.9 % 125 mL infusion (0 mg/hr Intravenous Stopped 8/3/24 0632)   diltiazem (CARDIZEM) injection 25 mg (25 mg Intravenous Given 8/3/24 0218)   sodium chloride 0.9 % bolus 1,000 mL (0 mL Intravenous Stopped 8/3/24 0247)   iohexol (OMNIPAQUE) 350 MG/ML injection (MULTI-DOSE) 70 mL (70 mL Intravenous Given 8/3/24 0340)   ceftriaxone (ROCEPHIN) 1 g/50 mL in dextrose IVPB (0 mg Intravenous Stopped 8/3/24 0522)   acetaminophen (TYLENOL) tablet 975 mg (975 mg Oral Given 8/3/24 0547)   azithromycin (ZITHROMAX) 500 mg in sodium chloride 0.9% 250mL IVPB 500 mg (0 mg Intravenous Stopped 8/3/24 0641)   sertraline (ZOLOFT) tablet 50 mg (50 mg Oral Given 8/3/24 0617)   LORazepam (ATIVAN) injection 0.5 mg (0.5 mg Intravenous Given 8/3/24 0639)   heparin (porcine)  injection 4,000 Units (4,000 Units Intravenous Given 8/3/24 0700)   magnesium Oxide (MAG-OX) tablet 800 mg (800 mg Oral Given 8/3/24 0843)       Diagnostic Studies  Results Reviewed       Procedure Component Value Units Date/Time    Procalcitonin [[id number]]  (Normal) Collected: 08/03/24 0220    Lab Status: Final result Specimen: Blood from Arm, Left Updated: 08/03/24 0854     Procalcitonin <0.05 ng/ml     HS Troponin I 4hr [[id number]]  (Abnormal) Collected: 08/03/24 0627    Lab Status: Final result Specimen: Blood from Arm, Right Updated: 08/03/24 0746     hs TnI 4hr 27 ng/L      Delta 4hr hsTnI 20 ng/L     APTT [[id number]]  (Normal) Collected: 08/03/24 0627    Lab Status: Final result Specimen: Blood from Arm, Right Updated: 08/03/24 0659     PTT 32 seconds     Protime-INR [[id number]]  (Normal) Collected: 08/03/24 0627    Lab Status: Final result Specimen: Blood from Arm, Right Updated: 08/03/24 0659     Protime 14.5 seconds      INR 1.05    Narrative:      INR Therapeutic Range    Indication                                             INR Range      Atrial Fibrillation                                               2.0-3.0  Hypercoagulable State                                    2.0.2.3  Left Ventricular Asist Device                            2.0-3.0  Mechanical Heart Valve                                  -    Aortic(with afib, MI, embolism, HF, LA enlargement,    and/or coagulopathy)                                     2.0-3.0 (2.5-3.5)     Mitral                                                             2.5-3.5  Prosthetic/Bioprosthetic Heart Valve               2.0-3.0  Venous thromboembolism (VTE: VT, PE        2.0-3.0    CBC [[id number]]  (Normal) Collected: 08/03/24 0627    Lab Status: Final result Specimen: Blood from Arm, Right Updated: 08/03/24 0655     WBC 9.95 Thousand/uL      RBC 4.59 Million/uL      Hemoglobin 13.9 g/dL      Hematocrit 41.2 %      MCV 90 fL      MCH 30.3 pg      MCHC 33.7 g/dL       RDW 13.2 %      Platelets 181 Thousands/uL      MPV 9.3 fL     Strep Pneumoniae, Urine [[id number]]     Lab Status: No result Specimen: Urine     Legionella antigen, Urine [[id number]]     Lab Status: No result Specimen: Urine     Blood culture #1 [[id number]] Collected: 08/03/24 0543    Lab Status: In process Specimen: Blood from Arm, Right Updated: 08/03/24 0548    Blood culture #2 [[id number]] Collected: 08/03/24 0543    Lab Status: In process Specimen: Blood from Arm, Left Updated: 08/03/24 0548    HS Troponin I 2hr [[id number]]  (Abnormal) Collected: 08/03/24 0402    Lab Status: Final result Specimen: Blood from Arm, Right Updated: 08/03/24 0432     hs TnI 2hr 31 ng/L      Delta 2hr hsTnI 24 ng/L     FLU/RSV/COVID - if FLU/RSV clinically relevant [[id number]]  (Normal) Collected: 08/03/24 0252    Lab Status: Final result Specimen: Nares from Nose Updated: 08/03/24 0342     SARS-CoV-2 Negative     INFLUENZA A PCR Negative     INFLUENZA B PCR Negative     RSV PCR Negative    Narrative:      FOR PEDIATRIC PATIENTS - copy/paste COVID Guidelines URL to browser: [email or web address]    SARS-CoV-2 assay is a Nucleic Acid Amplification assay intended for the  qualitative detection of nucleic acid from SARS-CoV-2 in nasopharyngeal  swabs. Results are for the presumptive identification of SARS-CoV-2 RNA.    Positive results are indicative of infection with SARS-CoV-2, the virus  causing COVID-19, but do not rule out bacterial infection or co-infection  with other viruses. Laboratories within the [place] and its  territories are required to report all positive results to the appropriate  public health authorities. Negative results do not preclude SARS-CoV-2  infection and should not be used as the sole basis for treatment or other  patient management decisions. Negative results must be combined with  clinical observations, patient history, and epidemiological  information.  This test has not been FDA cleared or approved.    This test has been authorized by FDA under an Emergency Use Authorization  (EUA). This test is only authorized for the duration of time the  declaration that circumstances exist justifying the authorization of the  emergency use of an in vitro diagnostic tests for detection of SARS-CoV-2  virus and/or diagnosis of COVID-19 infection under section 564(b)(1) of  the Act, 21 U.S.C. 360bbb-3(b)(1), unless the authorization is terminated  or revoked sooner. The test has been validated but independent review by FDA  and CLIA is pending.    Test performed using [employer] GeneXpert: This RT-PCR assay targets N2,  a region unique to SARS-CoV-2. A conserved region in the E-gene was chosen  for pan-Sarbecovirus detection which includes SARS-CoV-2.    According to CMS-2020-01-R, this platform meets the definition of high-throughput technology.    TSH, 3rd generation with Free T4 reflex [[id number]]  (Normal) Collected: 08/03/24 0220    Lab Status: Final result Specimen: Blood from Arm, Left Updated: 08/03/24 0315     TSH 3RD GENERATON 3.753 uIU/mL     HS Troponin 0hr (reflex protocol) [[id number]]  (Normal) Collected: 08/03/24 0220    Lab Status: Final result Specimen: Blood from Arm, Left Updated: 08/03/24 0306     hs TnI 0hr 7 ng/L     Urine Microscopic [[id number]]  (Abnormal) Collected: 08/03/24 0240    Lab Status: Final result Specimen: Urine, Clean Catch Updated: 08/03/24 0304     RBC, UA 1-2 /hpf      WBC, UA 10-20 /hpf      Epithelial Cells Occasional /hpf      Bacteria, UA Occasional /hpf     Urine culture [[id number]] Collected: 08/03/24 0240    Lab Status: In process Specimen: Urine, Clean Catch Updated: 08/03/24 0304    Lipase [[id number]]  (Normal) Collected: 08/03/24 0220    Lab Status: Final result Specimen: Blood from Arm, Left Updated: 08/03/24 0259     Lipase 21 u/L     Comprehensive metabolic panel [[id number]]  (Abnormal) Collected: 08/03/24 0220     Lab Status: Final result Specimen: Blood from Arm, Left Updated: 08/03/24 0259     Sodium 138 mmol/L      Potassium 3.5 mmol/L      Chloride 103 mmol/L      CO2 24 mmol/L      ANION GAP 11 mmol/L      BUN 9 mg/dL      Creatinine 0.58 mg/dL      Glucose 135 mg/dL      Calcium 9.5 mg/dL      AST 18 U/L      ALT 16 U/L      Alkaline Phosphatase 52 U/L      Total Protein 8.1 g/dL      Albumin 4.1 g/dL      Total Bilirubin 0.58 mg/dL      eGFR 104 ml/min/1.73sq m     Narrative:      National Kidney Disease Foundation guidelines for Chronic Kidney Disease (CKD):     Stage 1 with normal or high GFR (GFR > 90 mL/min/1.73 square meters)    Stage 2 Mild CKD (GFR = 60-89 mL/min/1.73 square meters)    Stage 3A Moderate CKD (GFR = 45-59 mL/min/1.73 square meters)    Stage 3B Moderate CKD (GFR = 30-44 mL/min/1.73 square meters)    Stage 4 Severe CKD (GFR = 15-29 mL/min/1.73 square meters)    Stage 5 End Stage CKD (GFR <15 mL/min/1.73 square meters)  Note: GFR calculation is accurate only with a steady state creatinine    Magnesium [[id number]]  (Abnormal) Collected: 08/03/24 0220    Lab Status: Final result Specimen: Blood from Arm, Left Updated: 08/03/24 0259     Magnesium 1.7 mg/dL     UA w Reflex to Microscopic w Reflex to Culture [[id number]]  (Abnormal) Collected: 08/03/24 0240    Lab Status: Final result Specimen: Urine, Clean Catch Updated: 08/03/24 0252     Color, UA Colorless     Clarity, UA Clear     Specific Gravity, UA 1.004     pH, UA 6.5     Leukocytes, UA Small     Nitrite, UA Negative     Protein,  (2+) mg/dl      Glucose, UA Negative mg/dl      Ketones, UA Trace mg/dl      Urobilinogen, UA <2.0 mg/dl      Bilirubin, UA Negative     Occult Blood, UA Small    D-Dimer [[id number]]  (Abnormal) Collected: 08/03/24 0220    Lab Status: Final result Specimen: Blood from Arm, Left Updated: 08/03/24 0251     D-Dimer, Quant 1.17 ug/ml FEU     Narrative:      In the evaluation for possible pulmonary embolism, in the  appropriate (Well's Score of 4 or less) patient, the age adjusted d-dimer cutoff for this patient can be calculated as:    Age x 0.01 (in ug/mL) for Age-adjusted D-dimer exclusion threshold for a patient over 50 years.    CBC and differential [[id number]]  (Abnormal) Collected: 08/03/24 0220    Lab Status: Final result Specimen: Blood from Arm, Left Updated: 08/03/24 0240     WBC 10.66 Thousand/uL      RBC 5.08 Million/uL      Hemoglobin 15.1 g/dL      Hematocrit 45.4 %      MCV 89 fL      MCH 29.7 pg      MCHC 33.3 g/dL      RDW 13.1 %      MPV 9.5 fL      Platelets 179 Thousands/uL      nRBC 0 /100 WBCs      Segmented % 54 %      Immature Grans % 1 %      Lymphocytes % 34 %      Monocytes % 10 %      Eosinophils Relative 1 %      Basophils Relative 0 %      Absolute Neutrophils 5.68 Thousands/µL      Absolute Immature Grans 0.05 Thousand/uL      Absolute Lymphocytes 3.67 Thousands/µL      Absolute Monocytes 1.11 Thousand/µL      Eosinophils Absolute 0.11 Thousand/µL      Basophils Absolute 0.04 Thousands/µL                    CTA ED chest PE study   Final Result by Ludin Villa MD (08/03 0545)      No pulmonary embolus.      Multi lobar infiltrates attributed to pneumonia, most prominent in the left lower lobe.      Trace bilateral pleural effusions, left greater than right.      The study was marked in EPIC for immediate notification.                  Workstation performed: [id number]         XR chest 1 view portable   ED Interpretation by Josh Jacobsen DO (08/03 0443)   Compared to previous, worsening diffuse opacity in R lung      Final Result by Jany Patterson MD (08/03 0658)      Redemonstration of left lower lobe pneumonia.            Workstation performed: [id number]               Procedures  ECG 12 Lead Documentation Only    Date/Time: 8/3/2024 3:53 AM    Performed by: Josh Jacobsen DO  Authorized by: Josh Jacobsen DO    ECG reviewed by me, the ED Provider: yes    Patient location:   ED  Previous ECG:     Previous ECG:  Unavailable  Interpretation:     Interpretation: abnormal    Rate:     ECG rate assessment: tachycardic    Rhythm:     Rhythm: atrial fibrillation    Ectopy:     Ectopy: none    QRS:     QRS axis:  Normal    QRS intervals:  Normal  Conduction:     Conduction: normal    ST segments:     ST segments:  Normal  T waves:     T waves: normal          ED Course  ED Course as of 08/03/24 0902   Sat Aug 03, 2024   0200 54-year-old female with no significant past cardiac history presents for sudden onset palpitations beginning at 0100 this morning.  Patient states that she was resting in bed and was awoken by heart palpitations. Denies any past history of ACS, A-fib, heart arrhythmia, CHF.  States that she has had viral illness and has been treating with Tylenol and Robitussin for 1 week.  At present patient feels anxious but denies any headaches, dizziness, chest pain, shortness of breath, abdominal pain, nausea, vomiting, diarrhea, dysuria, hematuria   0220 Patient treated with Cardizem bolus x2 for new onset A-fib with RVR.  Continued on Cardizem drip   0330 Patient reassessed and states she is feeling better continues to have heart palpitations.   0341 D-Dimer(!)  Elevated, concerning given new onset afib, ordered CT PE study   0345 Spoke with patient and  regarding elevated D-dimer and explained need for CT PE study to rule out pulmonary embolism   0359 FLU/RSV/COVID - if FLU/RSV clinically relevant  Negative   0359 CBC and differential(!)  WNL   0359 Comprehensive metabolic panel(!)  WNL   0401 TSH, 3rd generation with Free T4 reflex  WNL   0443 XR chest 1 view portable  Increased R sided opacity compared to previous, concern for pneumonia   0445 HS Troponin I 2hr(!)  Delta of 24. Patient continues to deny new onset chest pain. Palpitations remain present   0500 Patient started on community-acquired pneumonia antibiotic coverage   0550 IMPRESSION:     No pulmonary embolus.      Multi lobar infiltrates attributed to pneumonia, most prominent in the left lower lobe.     Trace bilateral pleural effusions, left greater than right.     The study was marked in EPIC for immediate notification.     0600 XR chest 1 view portable  IMPRESSION:     Redemonstration of left lower lobe pneumonia.               HEART Risk Score      Flowsheet Row Most Recent Value   Heart Score Risk Calculator    History 1 Filed at: 08/03/2024 0855   ECG 0 Filed at: 08/03/2024 0855   Age 1 Filed at: 08/03/2024 0855   Risk Factors 1 Filed at: 08/03/2024 0855   Troponin 0 Filed at: 08/03/2024 0855   HEART Score 3 Filed at: 08/03/2024 0855                PERC Rule for PE      Flowsheet Row Most Recent Value   PERC Rule for PE    Age >=50 1 Filed at: 08/03/2024 0856   HR >=100 1 Filed at: 08/03/2024 0856   O2 Sat on room air < 95% 0 Filed at: 08/03/2024 0856   History of PE or DVT 0 Filed at: 08/03/2024 0856   Recent trauma or surgery 0 Filed at: 08/03/2024 0856   Hemoptysis 0 Filed at: 08/03/2024 0856   Exogenous estrogen 0 Filed at: 08/03/2024 0856   Unilateral leg swelling 0 Filed at: 08/03/2024 0856   PERC Rule for PE Results 2 Filed at: 08/03/2024 0856                SBIRT 20yo+      Flowsheet Row Most Recent Value   Initial Alcohol Screen: US AUDIT-C     1. How often do you have a drink containing alcohol? 0 Filed at: 08/03/2024 0149   2. How many drinks containing alcohol do you have on a typical day you are drinking?  0 Filed at: 08/03/2024 0149   3a. Male UNDER 65: How often do you have five or more drinks on one occasion? 0 Filed at: 08/03/2024 0149   3b. FEMALE Any Age, or MALE 65+: How often do you have 4 or more drinks on one occassion? 0 Filed at: 08/03/2024 0149   Audit-C Score 0 Filed at: 08/03/2024 0149   ANA ROSA: How many times in the past year have you...    Used an illegal drug or used a prescription medication for non-medical reasons? Never Filed at: 08/03/2024 0149                  Medical Decision  Making  Patient presents with:  Palpitations: Patient arrived through triage with  for evaluation of palpitations that woke her up. Pt reports fatigue, body aches, headache since Monday. Last dose of Tylenol 1g 1930, advil 2130.      Patient seen and examined noted to be tachycardic to 180s on arrival. Difficult to detect underlying rhythm at current rate. Diltiazem administered to slow heart rate for suspected A-fib with RVR    Differential diagnosis includes but is not limited to A-fib w/ RVR, SVT, PE, pneumonia, viral illness.      Patient's labs notable for: D-Dimer 1.17.  HS troponin 0-hour 7, follow HS troponin 2 hr 31 with a delta 24. Ordered CT PE to evaluate for pulmonary embolism which was negative.  CT showed multilobar infiltrates attributed to pneumonia started azithromycin and ceftriaxone for community-acquired pneumonia coverage. and EKG: Tachycardic, irregular rhythm with no signs of ischemia interpreted by myself as above.    Heart Score: 3, PERC: 2    Patient to be admitted to inpatient for continued evaluation of multifocal pneumonia and new onset A-fib with RVR.  Discussed lab findings with patient and  who understand and agree    Amount and/or Complexity of Data Reviewed  Labs: ordered. Decision-making details documented in ED Course.  Radiology: ordered and independent interpretation performed. Decision-making details documented in ED Course.    Risk  OTC drugs.  Prescription drug management.  Decision regarding hospitalization.          Disposition  Final diagnoses:   Atrial fibrillation with RVR (HCC)   Pneumonia     Time reflects when diagnosis was documented in both MDM as applicable and the Disposition within this note       Time User Action Codes Description Comment    8/3/2024  5:49 AM Rianna Echavarria Add [I48.91] Atrial fibrillation with RVR (HCC)     8/3/2024  5:50 AM MulErlin harmony Add [J18.9] Pneumonia           ED Disposition       ED Disposition   Admit    Condition    Stable    Date/Time   Sat Aug 3, 2024 2635    Comment   Case was discussed with Dr. Gaines and the patient's admission status was agreed to be Admission Status: inpatient status to the service of Dr. Gaines .               Follow-up Information    None         Current Discharge Medication List        CONTINUE these medications which have NOT CHANGED    Details   esomeprazole (NexIUM) 20 mg packet 20 mg      sertraline (ZOLOFT) 50 mg tablet Take 1 tablet (50 mg total) by mouth daily  Qty: 90 tablet, Refills: 3    Associated Diagnoses: Generalized anxiety disorder      calcium carbonate (OS-ERICKA) 600 MG tablet Take 1 tablet (600 mg total) by mouth 2 (two) times a day with meals    Associated Diagnoses: Vitamin D deficiency      estradiol (ESTRACE VAGINAL) 0.1 mg/g vaginal cream Insert 1 g into the vagina daily for 14 days, THEN 1 g 2 (two) times a week.  Qty: 42.5 g, Refills: 3    Associated Diagnoses: Vaginal atrophy      fluocinolone acetonide (DermOtic) 0.01 % otic oil Administer 5 drops into both ears 2 (two) times a day  Qty: 20 mL, Refills: 0    Associated Diagnoses: Chronic eczematous otitis externa of both ears      multivitamin (THERAGRAN) TABS Take 1 tablet by mouth daily           No discharge procedures on file.    PDMP Review       None             ED Provider  Attending physically available and evaluated Leeanna Lacy. I managed the patient along with the ED Attending.    Electronically Signed by           Josh Jacobsen DO  08/03/24 0903

## 2024-08-03 NOTE — LETTER
[facility]  FLOOR MED SURG UNIT  [address]  [place] [address]  Dept: [phone]    August 4, 2024     Patient: Leeanna Lacy   YOB: 1970   Date of Visit: 8/3/2024       To Whom it May Concern:    Leeanna Lacy is under my professional care. She was seen in the hospital from 8/3/2024 to 08/04/24. She may return to work on 5/12/2024 without limitations. She may return earlier if she is sufficiently recovered from her illness.     If you have any questions or concerns, please don't hesitate to call.         Sincerely,          Antony Mccracken, DO

## 2024-08-03 NOTE — ASSESSMENT & PLAN NOTE
Continue home sertraline 25 mg  As needed Ativan 0.5 mg every 6 hours available the patient, would anticipate tight control of anxiety for improvement of heart rate

## 2024-08-03 NOTE — CONSULTS
[facility] Cardiology  CONSULT    Patient Name: Leeanna Lacy  Patient MRN: [id number]  Admission Date: 8/3/2024  1:51 AM  Attending Provider: Juana Cedillo MD  Service: Cardiology    Reason for consult: Atrial fibrillation      Assessment / Plan  This is a 54 y.o. female who presents with    Principal Problem:    Atrial fibrillation with RVR (HCC)  Active Problems:    Generalized anxiety disorder    Pneumonia involving left lung    Hypomagnesemia    This is a 54 year old female with left lung pneumonia and upper respiratory tract symptoms, who is presently being seen in consultation for evaluation of new onset atrial fibrillation with RVR. She has responded well to medical therapy with IV diltiazem, and spontaneously converted back to normal sinus rhythm on telemetry.    I personally reviewed her EKG from the ER, which demonstrates atrial fibrillation with RVR. Repeat EKG is pending. TSH wnl.     At the time of my evaluation, she is no longer experiencing palpitations. She is euvolemic, with no objective or biochemical evidence of low output or heart failure.     RECOMMENDATIONS:  - Transition diltiazem to 240mg long acting PO QD.   - No current indication for long term oral anticoagulation in the setting of [id number] of 1. To be re-addressed in the outpatient setting.  - I recommend an outpatient transthoracic echocardiogram.  - Outpatient cardiac rhythm monitor to assess arrhythmia burden, Ziopatch XT x14 days.   - No additional inpatient cardiac workup anticipated at this time. We will sign off. Please call if we can be of further assistance in the care of this patient. Outpatient follow up will be arranged.       Code Status: Level 1 - Full Code          HPI    This is a 54 y.o. female with the below noted past medical history, who is presently admitted for evaluation of URI symptoms and palpitations.    The patient reports that she has been experiencing upper respiratory track symptoms for the past 1 week  consisting of chills, cough, generalized fatigue, and fevers up to 102F at home. She has been self medicating with Tylenol and Advil. Outpatient testing has including negative COVID and flu testing. She tells me that she woke up early this morning experiencing severe palpitations that woke her from sleep, which prompted her to present to the hospital. She denies chest pain, shortness of breath, diaphoresis, dizziness, orthopnea, PND, edema, syncope.    In the ED, she as found to be in rapid atrial fibrillation and initiated on an intravenous cardizem infusion. A viral panel consisting of Flu/COVID/RSV was normal. High sensitivity troponins within normal normals. A chest CTA was completed, and showed infiltrates suggestive of pneumonia. We have been asked to see the patient in consultation for new atrial fibrillation with RVR.     Review of Systems  10 point review of systems negative except as noted in HPI    Past Medical History:   Diagnosis Date    Abnormal weight gain     Allergic Child    Anxiety     Arthralgia     GERD (gastroesophageal reflux disease)     Panic attack     Stomatitis     last assessed 13; resolved 16       Past Surgical History:   Procedure Laterality Date    CHOLECYSTECTOMY      CHOLECYSTECTOMY LAPAROSCOPIC      WISDOM TOOTH EXTRACTION         Family History   Problem Relation Age of Onset    Diabetes Mother     Breast cancer Mother 74    Asthma Mother     Hypertension Mother     Heart failure Father     Coronary artery disease Father     Diabetes Father             Other Father         pure hypercholesterolemia    No Known Problems Daughter     No Known Problems Daughter     Pancreatic cancer Paternal Grandmother     No Known Problems Maternal Aunt     No Known Problems Maternal Aunt     Breast cancer Paternal Aunt     Cancer Family     Mental illness Neg Hx     Substance Abuse Neg Hx     Colon cancer Neg Hx     Ovarian cancer Neg Hx     Endometrial cancer Neg Hx         Social History     Tobacco Use    Smoking status: Never    Smokeless tobacco: Never   Substance Use Topics    Alcohol use: Yes     Alcohol/week: 1.0 standard drink of alcohol     Types: 1 Glasses of wine per week     Comment: Social       Vitals:    08/03/24 0747   BP: 133/81   Pulse: 90   Resp: 20   Temp: 98 °F (36.7 °C)   SpO2: 94%        I/O last 3 completed shifts:  In: 1350 [IV Piggyback:1350]  Out: -   No intake/output data recorded.       Oxygen:  O2 Device: None (Room air)              Physical Examination:  Gen: A&Ox3  HEENT: NC/AT, no JVD  CVS: RRR, S1S2 audible and w/o m/r/g  Resp: Clear to ascultation bilaterally, no wheeze, rales, ronchi.  Abd: Soft, non-tender, non-distended.   MSK: No edema, non-tender. Normal ROM.      Current Medications:  Scheduled Meds:  Current Facility-Administered Medications   Medication Dose Route Frequency Provider Last Rate    acetaminophen  650 mg Oral Q6H PRN Curtis Newton MD      [START ON 8/4/2024] azithromycin  500 mg Intravenous Q24H Curtis Newton MD      [START ON 8/4/2024] cefTRIAXone  1,000 mg Intravenous Q24H Curtis Newton MD      dextromethorphan-guaiFENesin  10 mL Oral Q4H PRN Curtis Newton MD      diltiazem  1-15 mg/hr Intravenous Titrated Curtis Newton MD Stopped (08/03/24 1129)    heparin (porcine)  3-20 Units/kg/hr (Order-Specific) Intravenous Titrated Joe Martin MD 11.1 Units/kg/hr (08/03/24 0659)    heparin (porcine)  2,000 Units Intravenous Q6H PRN Joe Martin MD      heparin (porcine)  4,000 Units Intravenous Q6H PRN Joe Martin MD      ibuprofen  600 mg Oral Q6H PRN Curtis Newton MD      levalbuterol  0.31 mg Nebulization Q6H PRN Curtis Newton MD      LORazepam  0.5 mg Intramuscular Q6H PRN Curtis Newton MD      magnesium sulfate  2 g Intravenous Once Rebeca Elena DO 2 g (08/03/24 1129)    metoprolol  2.5 mg Intravenous Q6H PRN Curtis Newton MD      pantoprazole  20 mg Oral Early Morning Curtis RUDOLPH  MD Aman      sertraline  25 mg Oral Daily Curtis Newton MD       Continuous Infusions:diltiazem, 1-15 mg/hr, Last Rate: Stopped (08/03/24 1129)  heparin (porcine), 3-20 Units/kg/hr (Order-Specific), Last Rate: 11.1 Units/kg/hr (08/03/24 0659)      PRN Meds:.  acetaminophen    dextromethorphan-guaiFENesin    heparin (porcine)    heparin (porcine)    ibuprofen    levalbuterol    LORazepam    metoprolol    Laboratory Studies:  Lab Results   Component Value Date    WBC 9.95 08/03/2024    HGB 13.9 08/03/2024    HCT 41.2 08/03/2024    MCV 90 08/03/2024     08/03/2024       Lab Results   Component Value Date    GLUCOSE 99 07/31/2015    CALCIUM 9.5 08/03/2024    SODIUM 138 08/03/2024    K 3.5 08/03/2024    CO2 24 08/03/2024     08/03/2024    BUN 9 08/03/2024    CREATININE 0.58 (L) 08/03/2024       Lab Results   Component Value Date    HGBA1C 6.0 (H) 08/25/2023         Lab Results   Component Value Date    CHOL 171 07/31/2015    HDL 51 08/25/2023    LDLCALC 126 (H) 08/25/2023    TRIG 145 08/25/2023         CARDIAC IMAGING:    None    IMAGING  CTA ED chest PE study   Final Result      No pulmonary embolus.      Multi lobar infiltrates attributed to pneumonia, most prominent in the left lower lobe.      Trace bilateral pleural effusions, left greater than right.      The study was marked in EPIC for immediate notification.                  Workstation performed: [id number]         XR chest 1 view portable   ED Interpretation   Compared to previous, worsening diffuse opacity in R lung      Final Result      Redemonstration of left lower lobe pneumonia.            Workstation performed: [id number]                Drake Castle MD

## 2024-08-04 ENCOUNTER — APPOINTMENT (INPATIENT)
Dept: NON INVASIVE DIAGNOSTICS | Facility: HOSPITAL | Age: 54
DRG: 308 | End: 2024-08-04
Payer: COMMERCIAL

## 2024-08-04 VITALS
SYSTOLIC BLOOD PRESSURE: 141 MMHG | RESPIRATION RATE: 16 BRPM | TEMPERATURE: 99.2 F | DIASTOLIC BLOOD PRESSURE: 83 MMHG | WEIGHT: 210 LBS | HEART RATE: 72 BPM | HEIGHT: 64 IN | BODY MASS INDEX: 35.85 KG/M2 | OXYGEN SATURATION: 93 %

## 2024-08-04 LAB
ANION GAP SERPL CALCULATED.3IONS-SCNC: 9 MMOL/L (ref 4–13)
AORTIC ROOT: 2.7 CM
APICAL FOUR CHAMBER EJECTION FRACTION: 74 %
BACTERIA UR CULT: NORMAL
BASOPHILS # BLD AUTO: 0.02 THOUSANDS/ÂΜL (ref 0–0.1)
BASOPHILS NFR BLD AUTO: 0 % (ref 0–1)
BSA FOR ECHO PROCEDURE: 2 M2
BUN SERPL-MCNC: 8 MG/DL (ref 5–25)
CALCIUM SERPL-MCNC: 8.6 MG/DL (ref 8.4–10.2)
CHLORIDE SERPL-SCNC: 103 MMOL/L (ref 96–108)
CO2 SERPL-SCNC: 25 MMOL/L (ref 21–32)
CREAT SERPL-MCNC: 0.52 MG/DL (ref 0.6–1.3)
E WAVE DECELERATION TIME: 189 MS
E/A RATIO: 1.64
EOSINOPHIL # BLD AUTO: 0.02 THOUSAND/ÂΜL (ref 0–0.61)
EOSINOPHIL NFR BLD AUTO: 0 % (ref 0–6)
ERYTHROCYTE [DISTWIDTH] IN BLOOD BY AUTOMATED COUNT: 13 % (ref 11.6–15.1)
FRACTIONAL SHORTENING: 42 (ref 28–44)
GFR SERPL CREATININE-BSD FRML MDRD: 107 ML/MIN/1.73SQ M
GLUCOSE SERPL-MCNC: 117 MG/DL (ref 65–140)
HCT VFR BLD AUTO: 38.7 % (ref 34.8–46.1)
HGB BLD-MCNC: 13 G/DL (ref 11.5–15.4)
IMM GRANULOCYTES # BLD AUTO: 0.03 THOUSAND/UL (ref 0–0.2)
IMM GRANULOCYTES NFR BLD AUTO: 0 % (ref 0–2)
INTERVENTRICULAR SEPTUM IN DIASTOLE (PARASTERNAL SHORT AXIS VIEW): 0.9 CM
INTERVENTRICULAR SEPTUM: 0.9 CM (ref 0.6–1.1)
LAAS-AP2: 21.6 CM2
LAAS-AP4: 21.1 CM2
LEFT ATRIUM SIZE: 4.3 CM
LEFT ATRIUM VOLUME (MOD BIPLANE): 65 ML
LEFT ATRIUM VOLUME INDEX (MOD BIPLANE): 32.5 ML/M2
LEFT INTERNAL DIMENSION IN SYSTOLE: 2.5 CM (ref 2.1–4)
LEFT VENTRICULAR INTERNAL DIMENSION IN DIASTOLE: 4.3 CM (ref 3.5–6)
LEFT VENTRICULAR POSTERIOR WALL IN END DIASTOLE: 0.9 CM
LEFT VENTRICULAR STROKE VOLUME: 62 ML
LVSV (TEICH): 62 ML
LYMPHOCYTES # BLD AUTO: 2.68 THOUSANDS/ÂΜL (ref 0.6–4.47)
LYMPHOCYTES NFR BLD AUTO: 31 % (ref 14–44)
MAGNESIUM SERPL-MCNC: 2.1 MG/DL (ref 1.9–2.7)
MCH RBC QN AUTO: 30 PG (ref 26.8–34.3)
MCHC RBC AUTO-ENTMCNC: 33.6 G/DL (ref 31.4–37.4)
MCV RBC AUTO: 89 FL (ref 82–98)
MONOCYTES # BLD AUTO: 0.85 THOUSAND/ÂΜL (ref 0.17–1.22)
MONOCYTES NFR BLD AUTO: 10 % (ref 4–12)
MV E'TISSUE VEL-SEP: 15 CM/S
MV PEAK A VEL: 0.7 M/S
MV PEAK E VEL: 115 CM/S
MV STENOSIS PRESSURE HALF TIME: 55 MS
MV VALVE AREA P 1/2 METHOD: 4
NEUTROPHILS # BLD AUTO: 4.98 THOUSANDS/ÂΜL (ref 1.85–7.62)
NEUTS SEG NFR BLD AUTO: 59 % (ref 43–75)
NRBC BLD AUTO-RTO: 0 /100 WBCS
PLATELET # BLD AUTO: 191 THOUSANDS/UL (ref 149–390)
PMV BLD AUTO: 9.9 FL (ref 8.9–12.7)
POTASSIUM SERPL-SCNC: 3.5 MMOL/L (ref 3.5–5.3)
RA PRESSURE ESTIMATED: 3 MMHG
RBC # BLD AUTO: 4.34 MILLION/UL (ref 3.81–5.12)
RIGHT ATRIAL 2D VOLUME: 36 ML
RIGHT ATRIUM AREA SYSTOLE A4C: 15.2 CM2
RIGHT VENTRICLE ID DIMENSION: 3.5 CM
RV PSP: 31 MMHG
SL CV LEFT ATRIUM LENGTH A2C: 5.8 CM
SL CV LV EF: 70
SL CV PED ECHO LEFT VENTRICLE DIASTOLIC VOLUME (MOD BIPLANE) 2D: 85 ML
SL CV PED ECHO LEFT VENTRICLE SYSTOLIC VOLUME (MOD BIPLANE) 2D: 23 ML
SODIUM SERPL-SCNC: 137 MMOL/L (ref 135–147)
TR MAX PG: 28 MMHG
TR PEAK VELOCITY: 2.6 M/S
TRICUSPID ANNULAR PLANE SYSTOLIC EXCURSION: 2.4 CM
TRICUSPID VALVE PEAK REGURGITATION VELOCITY: 2.64 M/S
WBC # BLD AUTO: 8.58 THOUSAND/UL (ref 4.31–10.16)

## 2024-08-04 PROCEDURE — 87070 CULTURE OTHR SPECIMN AEROBIC: CPT

## 2024-08-04 PROCEDURE — 83735 ASSAY OF MAGNESIUM: CPT

## 2024-08-04 PROCEDURE — 80048 BASIC METABOLIC PNL TOTAL CA: CPT

## 2024-08-04 PROCEDURE — 93306 TTE W/DOPPLER COMPLETE: CPT | Performed by: INTERNAL MEDICINE

## 2024-08-04 PROCEDURE — 87205 SMEAR GRAM STAIN: CPT

## 2024-08-04 PROCEDURE — 93306 TTE W/DOPPLER COMPLETE: CPT

## 2024-08-04 PROCEDURE — 99239 HOSP IP/OBS DSCHRG MGMT >30: CPT | Performed by: INTERNAL MEDICINE

## 2024-08-04 PROCEDURE — 85025 COMPLETE CBC W/AUTO DIFF WBC: CPT

## 2024-08-04 RX ORDER — DILTIAZEM HYDROCHLORIDE 240 MG/1
240 CAPSULE, COATED, EXTENDED RELEASE ORAL DAILY
Qty: 30 CAPSULE | Refills: 0 | Status: SHIPPED | OUTPATIENT
Start: 2024-08-05

## 2024-08-04 RX ORDER — GUAIFENESIN/DEXTROMETHORPHAN 100-10MG/5
10 SYRUP ORAL EVERY 4 HOURS PRN
Qty: 118 ML | Refills: 0 | Status: SHIPPED | OUTPATIENT
Start: 2024-08-04

## 2024-08-04 RX ORDER — DIPHENHYDRAMINE HCL 25 MG
25 TABLET ORAL EVERY 6 HOURS PRN
Status: DISCONTINUED | OUTPATIENT
Start: 2024-08-04 | End: 2024-08-04 | Stop reason: HOSPADM

## 2024-08-04 RX ORDER — CEFPODOXIME PROXETIL 200 MG/1
200 TABLET, FILM COATED ORAL 2 TIMES DAILY
Qty: 6 TABLET | Refills: 0 | Status: SHIPPED | OUTPATIENT
Start: 2024-08-05 | End: 2024-08-08

## 2024-08-04 RX ADMIN — DEXTROSE 1000 MG: 50 INJECTION, SOLUTION INTRAVENOUS at 05:00

## 2024-08-04 RX ADMIN — GUAIFENESIN AND DEXTROMETHORPHAN 10 ML: 100; 10 SYRUP ORAL at 03:19

## 2024-08-04 RX ADMIN — ENOXAPARIN SODIUM 40 MG: 40 INJECTION SUBCUTANEOUS at 08:45

## 2024-08-04 RX ADMIN — DILTIAZEM HYDROCHLORIDE 240 MG: 240 CAPSULE, COATED, EXTENDED RELEASE ORAL at 08:43

## 2024-08-04 RX ADMIN — AZITHROMYCIN MONOHYDRATE 500 MG: 500 INJECTION, POWDER, LYOPHILIZED, FOR SOLUTION INTRAVENOUS at 05:00

## 2024-08-04 RX ADMIN — ACETAMINOPHEN 650 MG: 325 TABLET, FILM COATED ORAL at 11:15

## 2024-08-04 RX ADMIN — GUAIFENESIN AND DEXTROMETHORPHAN 10 ML: 100; 10 SYRUP ORAL at 11:15

## 2024-08-04 RX ADMIN — ACETAMINOPHEN 650 MG: 325 TABLET, FILM COATED ORAL at 03:19

## 2024-08-04 RX ADMIN — PANTOPRAZOLE SODIUM 20 MG: 20 TABLET, DELAYED RELEASE ORAL at 05:00

## 2024-08-04 NOTE — DISCHARGE INSTR - AVS FIRST PAGE
Dear Leeanna Lacy,     It was our pleasure to care for you here at [facility].  It is our hope that we were always able to exceed the expected standards for your care during your stay.  You were hospitalized due to palpitations.  You were cared for on the 3rd floor by Antony Mccracken DO under the service of Juana Cedillo MD with the [facility] who covers for your primary care physician (PCP), Devora Bundy PA-C, while you were hospitalized.  If you have any questions or concerns related to this hospitalization, you may contact us at .  For follow up as well as any medication refills, we recommend that you follow up with your primary care physician.  A registered nurse will reach out to you by phone within a few days after your discharge to answer any additional questions that you may have after going home.  However, at this time we provide for you here, the most important instructions / recommendations at discharge:     Notable Medication Adjustments -   Start diltiazem 240 mg daily  Testing Required after Discharge -   None  ** Please contact your PCP to request testing orders for any of the testing recommended here **  Important follow up information -   Please follow up with cardiology for a Zio monitor and to follow up on your ECHO results  Please follow up with your PCP within one week  Other Instructions -   Please call your doctor or return to the hospital if symptoms worsen  Please review this entire after visit summary as additional general instructions including medication list, appointments, activity, diet, any pertinent wound care, and other additional recommendations from your care team that may be provided for you.      Sincerely,     Antony Mccracken DO

## 2024-08-04 NOTE — UTILIZATION REVIEW
Initial Clinical Review    Admission: Date/Time/Statement:   Admission Orders (From admission, onward)       Ordered        08/03/24 0551  INPATIENT ADMISSION  Once                          Orders Placed This Encounter   Procedures    INPATIENT ADMISSION     Standing Status:   Standing     Number of Occurrences:   1     Order Specific Question:   Level of Care     Answer:   Med Surg [16]     Order Specific Question:   Estimated length of stay     Answer:   More than 2 Midnights     Order Specific Question:   Certification     Answer:   I certify that inpatient services are medically necessary for this patient for a duration of greater than two midnights. See H&P and MD Progress Notes for additional information about the patient's course of treatment.     ED Arrival Information       Expected   -    Arrival   8/3/2024 01:39    Acuity   Emergent              Means of arrival   Walk-In    Escorted by   Spouse    Service   Hospitalist    Admission type   Emergency              Arrival complaint   rapid heart rate             Chief Complaint   Patient presents with    Palpitations     Patient arrived through triage with  for evaluation of palpitations that woke her up. Pt reports fatigue, body aches, headache since Monday. Last dose of Tylenol 1g 1930, advil 2130.       Initial Presentation: 54 y.o. female to ED as walk in reports palpitations.  PMH  anxiety reports over prior wk sshe has been experiencing URI-like symptoms consisting of chills, cough, malaise, fevers with Tmax 102 at home. Supportive care at home w telehealth appt prescribed flu & COVID test (neg); on day of admit awoken w palpitations  EXAM  EKG w AFib w RVR, BP  elevated.  CT chest reveals left lung multilobar infiltrate suspicious for pneumonia. Per Cardiology IV Cardizem  bolus x2/ GTT  Inpatient admission due to Atrial fib w RVR, PNA of L lung  Per Cardiology cont IV Cardizem for goal heart rate below 120, if refractorry add Lopressor 2.5 mg  PRN; IV Heparin GTT, tele, follow HD. IV Ceftriaxone, azithromycin IV; Robitussin; Nebs Q 65 RH; Influenza A/B and RSV, Urine Strep, Legionella, Sputum cultures, Procalcitonin tomorrow a.m. Follow BCX, pro jory , sputum CX, given IV MAG  Cardiology  Atrial fibrillation with RVR   Transition diltiazem to 240mg long acting PO QD.   -No current indication for long term oral anticoagulation in the setting of [id number] of 1. To be re-addressed in the outpatient setting.  - I recommend an outpatient transthoracic echocardiogram.  - Outpatient cardiac rhythm monitor to assess arrhythmia burden, Ziopatch XT x14 days.   - No additional inpatient cardiac workup anticipated at this time  Anticipated Length of Stay/Certification Statement:   Patient will be admitted on an inpatient basis with an anticipated length of stay of greater than 2 midnights secondary to afib with RVR.   Date: 8/4/2024   Day 2:   Cont Cardizem 240 mg dialy, ECHO OP. Stable for discharge with outpatient cardiology follow up/Zio patch   Ceftriaxone switched to cefpodoxime on dc for total 5 day course  Azithromycin discontinued due to low suspicion of atypical infection  Follow up BCX result OP   ED Triage Vitals [08/03/24 0148]   Temperature Pulse Respirations Blood Pressure SpO2 Pain Score   98.8 °F (37.1 °C) (!) 156 21 (!) 217/93 97 % No Pain     Weight (last 2 days)       Date/Time Weight    08/04/24 1517 95.3 (210)    08/03/24 0148 95.3 (210)            Vital Signs (last 3 days)       Date/Time Temp Pulse Resp BP MAP (mmHg) SpO2 O2 Device Patient Position - Orthostatic VS [place] Coma Scale Score Pain    08/04/24 15:26:56 99.2 °F (37.3 °C) 72 16 141/83 102 93 % -- -- -- --    08/04/24 1517 -- 84 -- 150/88 -- -- -- -- -- --    08/04/24 0900 -- -- -- -- -- -- -- -- 15 No Pain    08/04/24 07:22:49 98.3 °F (36.8 °C) 84 17 150/88 109 92 % -- -- -- --    08/04/24 0319 -- 78 -- -- -- -- -- -- -- 3    08/04/24 0300 -- 86 -- -- -- -- -- -- -- --    08/04/24  0200 -- 72 -- -- -- -- -- -- -- --    08/04/24 0100 -- 72 -- -- -- 91 % -- -- -- --    08/03/24 2300 -- 70 -- -- -- 95 % -- -- -- --    08/03/24 2100 -- 74 -- -- -- -- -- -- 15 2    08/03/24 1955 -- -- -- -- -- -- -- -- -- 5    08/03/24 19:49:18 98.7 °F (37.1 °C) 79 -- 170/93 119 95 % -- -- -- --    08/03/24 19:47:39 -- 85 -- 170/93 119 94 % -- -- -- --    08/03/24 18:25:24 98.7 °F (37.1 °C) 77 -- -- -- 96 % -- -- -- --    08/03/24 15:12:41 100 °F (37.8 °C) 86 -- 153/86 108 95 % -- -- -- --    08/03/24 15:11:53 100 °F (37.8 °C) 85 17 153/87 109 94 % -- -- -- --    08/03/24 1432 99.2 °F (37.3 °C) -- -- -- -- -- -- -- -- --    08/03/24 1427 -- -- -- -- -- -- -- -- -- Med Not Given for Pain - for MAR use only    08/03/24 1234 98.7 °F (37.1 °C) -- -- -- -- -- -- -- -- --    08/03/24 0747 98 °F (36.7 °C) 90 20 133/81 102 94 % None (Room air) Sitting -- --    08/03/24 0530 -- 141 19 131/61 88 98 % None (Room air) Lying -- --    08/03/24 0500 98.3 °F (36.8 °C) 152 18 123/58 78 98 % None (Room air) Lying -- --    08/03/24 0430 -- 128 17 147/78 106 98 % None (Room air) Sitting -- --    08/03/24 0426 -- 140 -- -- -- -- -- -- -- --    08/03/24 0400 -- 112 17 144/69 95 98 % None (Room air) Sitting -- --    08/03/24 0310 -- 132 -- 139/68 -- -- -- -- -- --    08/03/24 0247 -- -- -- -- -- -- -- -- 15 --    08/03/24 0245 -- 118 18 134/64 92 97 % None (Room air) Sitting -- --    08/03/24 0232 -- 139 -- 134/64 -- -- -- -- -- --    08/03/24 0226 -- 122 -- -- -- -- -- -- -- --    08/03/24 0200 -- 184 20 222/144 177 97 % None (Room air) Sitting -- --    08/03/24 0148 98.8 °F (37.1 °C) 156 21 217/93 -- 97 % None (Room air) Sitting -- No Pain              Pertinent Labs/Diagnostic Test Results:   Radiology:  CTA ED chest PE study   Final Interpretation by Ludin Villa MD (08/03 0545)      No pulmonary embolus.      Multi lobar infiltrates attributed to pneumonia, most prominent in the left lower lobe.      Trace bilateral  pleural effusions, left greater than right.      The study was marked in EPIC for immediate notification.                  Workstation performed: [id number]         XR chest 1 view portable   ED Interpretation by Josh Jacobsen DO (08/03 0443)   Compared to previous, worsening diffuse opacity in R lung      Final Interpretation by Jany Patterson MD (08/03 0658)      Redemonstration of left lower lobe pneumonia.            Workstation performed: [id number]           Cardiology:  Echo complete w/ contrast if indicated   Final Result by Drake Castle MD (08/04 1533)        Left Ventricle: Left ventricular cavity size is normal. Wall thickness    is normal. The left ventricular ejection fraction is 65-70%. Systolic    function is vigorous. Wall motion is normal. Diastolic function is normal.     Right Ventricle: Right ventricular cavity size is normal. Systolic    function is normal.     Left Atrium: The atrium is mildly dilated.     Right Atrium: The atrium is normal in size.     No significant valvular abnormalities seen.           GI:  No orders to display       Results from last 7 days   Lab Units 08/03/24  0252 08/02/24  0935   SARS-COV-2  Negative Negative     Results from last 7 days   Lab Units 08/04/24  0439 08/03/24  0627 08/03/24  0220   WBC Thousand/uL 8.58 9.95 10.66*   HEMOGLOBIN g/dL 13.0 13.9 15.1   HEMATOCRIT % 38.7 41.2 45.4   PLATELETS Thousands/uL 191 181 179   TOTAL NEUT ABS Thousands/µL 4.98  --  5.68         Results from last 7 days   Lab Units 08/04/24  0439 08/03/24  0220   SODIUM mmol/L 137 138   POTASSIUM mmol/L 3.5 3.5   CHLORIDE mmol/L 103 103   CO2 mmol/L 25 24   ANION GAP mmol/L 9 11   BUN mg/dL 8 9   CREATININE mg/dL 0.52* 0.58*   EGFR ml/min/1.73sq m 107 104   CALCIUM mg/dL 8.6 9.5   MAGNESIUM mg/dL 2.1 1.7*     Results from last 7 days   Lab Units 08/03/24  0220   AST U/L 18   ALT U/L 16   ALK PHOS U/L 52   TOTAL PROTEIN g/dL 8.1   ALBUMIN g/dL 4.1   TOTAL BILIRUBIN mg/dL 0.58  "        Results from last 7 days   Lab Units 08/04/24  0439 08/03/24  0220   GLUCOSE RANDOM mg/dL 117 135             No results found for: \"BETA-HYDROXYBUTYRATE\"                   Results from last 7 days   Lab Units 08/03/24  0627 08/03/24  0402 08/03/24  0220   HS TNI 0HR ng/L  --   --  7   HS TNI 2HR ng/L  --  31  --    HSTNI D2 ng/L  --  24*  --    HS TNI 4HR ng/L 27  --   --    HSTNI D4 ng/L 20*  --   --      Results from last 7 days   Lab Units 08/03/24  0220   D-DIMER QUANTITATIVE ug/ml FEU 1.17*     Results from last 7 days   Lab Units 08/03/24  0627   PROTIME seconds 14.5   INR  1.05   PTT seconds 32     Results from last 7 days   Lab Units 08/03/24  0220   TSH 3RD GENERATON uIU/mL 3.753     Results from last 7 days   Lab Units 08/03/24  0220   PROCALCITONIN ng/ml <0.05                                     Results from last 7 days   Lab Units 08/03/24  0220   LIPASE u/L 21                 Results from last 7 days   Lab Units 08/03/24  0240   CLARITY UA  Clear   COLOR UA  Colorless   SPEC GRAV UA  1.004   PH UA  6.5   GLUCOSE UA mg/dl Negative   KETONES UA mg/dl Trace*   BLOOD UA  Small*   PROTEIN UA mg/dl 200 (2+)*   NITRITE UA  Negative   BILIRUBIN UA  Negative   UROBILINOGEN UA (BE) mg/dl <2.0   LEUKOCYTES UA  Small*   WBC UA /hpf 10-20*   RBC UA /hpf 1-2   BACTERIA UA /hpf Occasional   EPITHELIAL CELLS WET PREP /hpf Occasional     Results from last 7 days   Lab Units 08/03/24  0252 08/02/24  0935   INFLUENZA A PCR  Negative Negative   INFLUENZA B PCR  Negative Negative   RSV PCR  Negative  --                              Results from last 7 days   Lab Units 08/03/24  0543 08/03/24  0240   BLOOD CULTURE  No Growth at 24 hrs.  No Growth at 24 hrs.  --    URINE CULTURE   --  <10,000 cfu/ml                   ED Treatment-Medication Administration from 08/03/2024 0139 to 08/03/2024 0743         Date/Time Order Dose Route Action     08/03/2024 0209 diltiazem (CARDIZEM) injection 15 mg 15 mg Intravenous Given "     08/03/2024 0232 diltiazem (CARDIZEM) 125 mg in sodium chloride 0.9 % 125 mL infusion 10 mg/hr Intravenous New Bag     08/03/2024 0310 diltiazem (CARDIZEM) 125 mg in sodium chloride 0.9 % 125 mL infusion 12.5 mg/hr Intravenous Rate/Dose Change     08/03/2024 0426 diltiazem (CARDIZEM) 125 mg in sodium chloride 0.9 % 125 mL infusion 15 mg/hr Intravenous Rate/Dose Change     08/03/2024 0218 diltiazem (CARDIZEM) injection 25 mg 25 mg Intravenous Given     08/03/2024 0229 sodium chloride 0.9 % bolus 1,000 mL 1,000 mL Intravenous New Bag     08/03/2024 0340 iohexol (OMNIPAQUE) 350 MG/ML injection (MULTI-DOSE) 70 mL 70 mL Intravenous Given     08/03/2024 0423 ceftriaxone (ROCEPHIN) 1 g/50 mL in dextrose IVPB 1,000 mg Intravenous New Bag     08/03/2024 0547 acetaminophen (TYLENOL) tablet 975 mg 975 mg Oral Given     08/03/2024 0547 azithromycin (ZITHROMAX) 500 mg in sodium chloride 0.9% 250mL IVPB 500 mg 500 mg Intravenous New Bag     08/03/2024 0617 sertraline (ZOLOFT) tablet 50 mg 50 mg Oral Given     08/03/2024 0639 pantoprazole (PROTONIX) EC tablet 20 mg 20 mg Oral Given     08/03/2024 0639 LORazepam (ATIVAN) injection 0.5 mg 0.5 mg Intravenous Given     08/03/2024 0700 heparin (porcine) injection 4,000 Units 4,000 Units Intravenous Given     08/03/2024 0659 heparin (porcine) 25,000 units in 0.45% NaCl 250 mL infusion (premix) 11.1 Units/kg/hr Intravenous New Bag     08/03/2024 0629 diltiazem (CARDIZEM) 125 mg in sodium chloride 0.9 % 125 mL infusion 15 mg/hr Intravenous New Bag            Past Medical History:   Diagnosis Date    Abnormal weight gain     Allergic Child    Anxiety     Arthralgia     GERD (gastroesophageal reflux disease)     Panic attack     Stomatitis     last assessed 7/22/13; resolved 5/4/16     Present on Admission:   Generalized anxiety disorder      Admitting Diagnosis: Palpitations [R00.2]  Pneumonia [J18.9]  Atrial fibrillation with RVR (HCC) [I48.91]  Age/Sex: 54 y.o. female  Admission  Orders:  Tele  echo  OOB  Scheduled Medications:  azithromycin, 500 mg, Intravenous, Q24H  cefTRIAXone, 1,000 mg, Intravenous, Q24H  diltiazem, 240 mg, Oral, Daily  enoxaparin, 40 mg, Subcutaneous, Q24H RUSTY  pantoprazole, 20 mg, Oral, Early Morning  sertraline, 25 mg, Oral, Daily      Continuous IV Infusions:  Medications 08/03 08/04   diltiazem (CARDIZEM) 125 mg in sodium chloride 0.9 % 125 mL infusion  Rate: 1-15 mL/hr Dose: 1-15 mg/hr  Freq: Titrated Route: IV  Last Dose: Stopped (08/03/24 1129)  Start: 08/03/24 0630 End: 08/03/24 1313   Admin Instructions:       0629 [C]     0841     0957     1010     1023     1038     1053     1129     1313-D/C'd       heparin (porcine) 25,000 units in 0.45% NaCl 250 mL infusion (premix)  Rate: 2.7-18 mL/hr Dose: 3-20 Units/kg/hr  Weight Dosing Info: 90 kg (Order-Specific)  Freq: Titrated Route: IV  Last Dose: 11.1 Units/kg/hr (08/03/24 0659)  Start: 08/03/24 0630 End: 08/03/24 1313   Admin Instructions:      Order specific questions:       0659     1313-D/C'd            PRN Meds:  acetaminophen, 650 mg, Oral, Q6H PRN  dextromethorphan-guaiFENesin, 10 mL, Oral, Q4H PRN  diphenhydrAMINE, 25 mg, Oral, Q6H PRN  ibuprofen, 600 mg, Oral, Q6H PRN  levalbuterol, 1.25 mg, Nebulization, Q6H PRN  LORazepam, 0.5 mg, Oral, Q6H PRN  metoprolol, 2.5 mg, Intravenous, Q6H PRN        IP CONSULT TO CARDIOLOGY    Network Utilization Review Department  ATTENTION: Please call with any questions or concerns to [phone] and carefully listen to the prompts so that you are directed to the right person. All voicemails are confidential.   For Discharge needs, contact Care Management DC Support Team at [phone] opt. [phone]  Send all requests for admission clinical reviews, approved or denied determinations and any other requests to dedicated fax number below belonging to the campus where the patient is receiving treatment. List of dedicated fax numbers for the Facilities:  FACILITY NAME UR FAX NUMBER    ADMISSION DENIALS (Administrative/Medical Necessity) [phone]   DISCHARGE SUPPORT TEAM (NETWORK) [phone]   PARENT CHILD HEALTH (Maternity/NICU/Pediatrics) [phone]   [facility] [phone]   [facility] [phone]   [facility] [phone]   [facility] [phone]   [facility] [phone]   [facility] [phone]   General acute hospital [phone]   [facility] [phone]   [facility] [phone]   [facility] [phone]   [facility] [phone]   [facility] [phone]

## 2024-08-04 NOTE — ASSESSMENT & PLAN NOTE
Symptoms: chills without rigors, cough, fever to 102, headache, and sore throat patient endorses for roughly last week she has had above listed symptoms.  Patient has sick contact inform of stepfather and family function roughly 1 week ago, recently seen and evaluated in the outpatient setting with negative flu RSV and chest x-ray findings. Significant improvement noted as of 8/4.     Lab Results   Component Value Date    SARSCOV2 Negative 08/03/2024    SARSCOV2 Negative 08/02/2024       CTA ED chest PE study    Result Date: 8/3/2024  Impression: No pulmonary embolus. Multi lobar infiltrates attributed to pneumonia, most prominent in the left lower lobe. Trace bilateral pleural effusions, left greater than right. The study was marked in EPIC for immediate notification. Workstation performed: [id number]       Recent Labs     08/03/24  0220 08/03/24  0627 08/04/24  0439   WBC 10.66* 9.95 8.58       Recent Labs     08/03/24  0220   PROCALCITONI <0.05        Plan:  Stable for discharge  Ceftriaxone switched to cefpodoxime on dc for total 5 day course  Azithromycin discontinued due to low suspicion of atypical infection  Blood cultures taken, follow for result outpatient

## 2024-08-04 NOTE — PLAN OF CARE
Problem: PAIN - ADULT  Goal: Verbalizes/displays adequate comfort level or baseline comfort level  Description: Interventions:  - Encourage patient to monitor pain and request assistance  - Assess pain using appropriate pain scale  - Administer analgesics based on type and severity of pain and evaluate response  - Implement non-pharmacological measures as appropriate and evaluate response  - Consider cultural and social influences on pain and pain management  - Notify physician/advanced practitioner if interventions unsuccessful or patient reports new pain  Outcome: Progressing     Problem: INFECTION - ADULT  Goal: Absence or prevention of progression during hospitalization  Description: INTERVENTIONS:  - Assess and monitor for signs and symptoms of infection  - Monitor lab/diagnostic results  - Monitor all insertion sites, i.e. indwelling lines, tubes, and drains  - Monitor endotracheal if appropriate and nasal secretions for changes in amount and color  - [place] appropriate cooling/warming therapies per order  - Administer medications as ordered  - Instruct and encourage patient and family to use good hand hygiene technique  - Identify and instruct in appropriate isolation precautions for identified infection/condition  Outcome: Progressing     Problem: SAFETY ADULT  Goal: Patient will remain free of falls  Description: INTERVENTIONS:  - Educate patient/family on patient safety including physical limitations  - Instruct patient to call for assistance with activity   - Consult OT/PT to assist with strengthening/mobility   - Keep Call bell within reach  - Keep bed low and locked with side rails adjusted as appropriate  - Keep care items and personal belongings within reach  - Initiate and maintain comfort rounds  - Make Fall Risk Sign visible to staff  - Offer Toileting every 2 Hours, in advance of need  - Apply yellow socks and bracelet for high fall risk patients  - Consider moving patient to room near nurses  station  Outcome: Progressing  Goal: Maintain or return to baseline ADL function  Description: INTERVENTIONS:  -  Assess patient's ability to carry out ADLs; assess patient's baseline for ADL function and identify physical deficits which impact ability to perform ADLs (bathing, care of mouth/teeth, toileting, grooming, dressing, etc.)  - Assess/evaluate cause of self-care deficits   - Assess range of motion  - Assess patient's mobility; develop plan if impaired  - Assess patient's need for assistive devices and provide as appropriate  - Encourage maximum independence but intervene and supervise when necessary  - Involve family in performance of ADLs  - Assess for home care needs following discharge   - Consider OT consult to assist with ADL evaluation and planning for discharge  - Provide patient education as appropriate  Outcome: Progressing  Goal: Maintains/Returns to pre admission functional level  Description: INTERVENTIONS:  - Perform AM-PAC 6 Click Basic Mobility/ Daily Activity assessment daily.  - Set and communicate daily mobility goal to care team and patient/family/caregiver.   - Collaborate with rehabilitation services on mobility goals if consulted  - Perform Range of Motion 3 times a day.  - Reposition patient every 2 hours.  - Dangle patient 3 times a day  - Stand patient 3 times a day  - Ambulate patient 3 times a day  - Out of bed to chair 3 times a day   - Out of bed for meals 3 times a day  - Out of bed for toileting  - Record patient progress and toleration of activity level   Outcome: Progressing     Problem: DISCHARGE PLANNING  Goal: Discharge to home or other facility with appropriate resources  Description: INTERVENTIONS:  - Identify barriers to discharge w/patient and caregiver  - Arrange for needed discharge resources and transportation as appropriate  - Identify discharge learning needs (meds, wound care, etc.)  - Arrange for interpretive services to assist at discharge as needed  - Refer  to Case Management Department for coordinating discharge planning if the patient needs post-hospital services based on physician/advanced practitioner order or complex needs related to functional status, cognitive ability, or social support system  Outcome: Progressing     Problem: Knowledge Deficit  Goal: Patient/family/caregiver demonstrates understanding of disease process, treatment plan, medications, and discharge instructions  Description: Complete learning assessment and assess knowledge base.  Interventions:  - Provide teaching at level of understanding  - Provide teaching via preferred learning methods  Outcome: Progressing

## 2024-08-04 NOTE — DISCHARGE SUMMARY
"[facility]  Discharge- Leeanna Lacy 1970, 54 y.o. female MRN: [id number]  Unit/Bed#: S -01 Encounter: [id number]  Primary Care Provider: Devora Bundy PA-C   Date and time admitted to hospital: 8/3/2024  1:51 AM    * Atrial fibrillation with RVR (HCC)  Assessment & Plan  Patient endorses upper respiratory infection for past week with episodic palpitations throughout that time.  Patient states on morning of admission she felt like her heart was \"racing\", which caused her to present to the ED for assessment.    In the ED, patient was found to be in A-fib with RVR with rates 170-190.  Patient was administered Cardizem bolus x 2 with improvement and placed on Cardizem drip for further rate control.  Assessment in the ED demonstrates stable blood pressure, without overt subjective symptoms of RVR.    Plan:  Stable for discharge with outpatient cardiology follow up/Zio patch  Follow up results of ECHO outpatient  Continue with cardizem 240 mg daily      Pneumonia involving left lung  Assessment & Plan  Symptoms: chills without rigors, cough, fever to 102, headache, and sore throat patient endorses for roughly last week she has had above listed symptoms.  Patient has sick contact inform of stepfather and family function roughly 1 week ago, recently seen and evaluated in the outpatient setting with negative flu RSV and chest x-ray findings. Significant improvement noted as of 8/4.     Lab Results   Component Value Date    SARSCOV2 Negative 08/03/2024    SARSCOV2 Negative 08/02/2024       CTA ED chest PE study    Result Date: 8/3/2024  Impression: No pulmonary embolus. Multi lobar infiltrates attributed to pneumonia, most prominent in the left lower lobe. Trace bilateral pleural effusions, left greater than right. The study was marked in EPIC for immediate notification. Workstation performed: [id number]       Recent Labs     08/03/24  0220 08/03/24  0627 08/04/24  0439   WBC 10.66* " 9.95 8.58       Recent Labs     08/03/24  0220   PROCALCITONI <0.05        Plan:  Stable for discharge  Ceftriaxone switched to cefpodoxime on dc for total 5 day course  Azithromycin discontinued due to low suspicion of atypical infection  Blood cultures taken, follow for result outpatient      Hypomagnesemia  Assessment & Plan  Recent Labs     08/03/24  0220 08/04/24  0439   MG 1.7* 2.1     Plan:  Stable for discharge    Generalized anxiety disorder  Assessment & Plan  Continue home sertraline 25 mg        Medical Problems       Resolved Problems  Date Reviewed: 8/4/2024   None       Discharging Resident: Antony Mccracken DO  Discharging Attending: Juana Cedillo MD  PCP: Devora Bundy PA-C  Admission Date:   Admission Orders (From admission, onward)       Ordered        08/03/24 0551  INPATIENT ADMISSION  Once                          Discharge Date: 08/04/24    Consultations During Hospital Stay:  Cardiology     Procedures Performed:   Echo     Significant Findings / Test Results:   L lower lobe PNA seen on CT    Incidental Findings:   None      Test Results Pending at Discharge (will require follow up):  ECHO  Blood cultures     Outpatient Tests Requested:  None     Complications:  none     Reason for Admission: pneumonia and afib with rvr    Hospital Course:   Leeanna Lacy is a 54 y.o. female patient who originally presented to the hospital on 8/3/2024 due to palpitations, fevers, chills, cough, and malaise. On arrival she was noted to be in afib with rvr and hypertensive 200's/100's. Cxr and CTA chest both notable for LLL pneumonia. She was started on ceftriaxone and azithromycin for treatment of CAP. Cardiology was consulted, and rate was controlled with a Cardizem drip, then transitioned to Cardizem 240 mg PO daily. She continued to improve clinically and was deemed stable for discharge on 8/4 with cefpodoxime to finish her course of abx, as well as outpatient follow up with cardiology for Zio patch  "monitoring.       The patient, initially admitted to the hospital as inpatient, was discharged earlier than expected given the following: clinical improvement.    Please see above list of diagnoses and related plan for additional information.     Condition at Discharge: good    Discharge Day Visit / Exam:   Subjective:  Pt seen at bedside. Some residual cough and mild chest congestion, but overall significantly improved with no fevers, chills, palpitations, SOB, or chest pain. She is eager for discharge.     Vitals: Blood Pressure: 150/88 (08/04/24 0722)  Pulse: 84 (08/04/24 0722)  Temperature: 98.3 °F (36.8 °C) (08/04/24 0722)  Temp Source: Oral (08/03/24 1432)  Respirations: 17 (08/04/24 0722)  Height: 5' 4\" (162.6 cm) (08/03/24 0148)  Weight - Scale: 95.3 kg (210 lb) (08/03/24 0148)  SpO2: 92 % (08/04/24 0722)  Exam:   Physical Exam  Constitutional:       General: She is not in acute distress.     Appearance: She is not toxic-appearing.   HENT:      Head: Normocephalic and atraumatic.      Nose: Nose normal.   Eyes:      Extraocular Movements: Extraocular movements intact.      Conjunctiva/sclera: Conjunctivae normal.   Cardiovascular:      Rate and Rhythm: Normal rate and regular rhythm.      Pulses: Normal pulses.      Heart sounds: Normal heart sounds.   Pulmonary:      Effort: Pulmonary effort is normal.      Breath sounds: Rhonchi (mild, L lower lung field) present. No wheezing or rales.   Abdominal:      Palpations: Abdomen is soft.      Tenderness: There is no abdominal tenderness.   Musculoskeletal:      Cervical back: Normal range of motion.      Right lower leg: No edema.      Left lower leg: No edema.   Skin:     General: Skin is warm and dry.   Neurological:      Mental Status: She is alert and oriented to person, place, and time. Mental status is at baseline.   Psychiatric:         Mood and Affect: Mood normal.         Behavior: Behavior normal.         Thought Content: Thought content normal.        "  Judgment: Judgment normal.          Discussion with Family: Updated  () at bedside.    Discharge instructions/Information to patient and family:   See after visit summary for information provided to patient and family.      Provisions for Follow-Up Care:  See after visit summary for information related to follow-up care and any pertinent home health orders.      Mobility at time of Discharge:   Basic Mobility Inpatient Raw Score: 24  JH-HLM Goal: 8: Walk 250 feet or more  JH-HLM Achieved: 8: Walk 250 feet ot more  HLM Goal achieved. Continue to encourage appropriate mobility.     Disposition:   Home    Planned Readmission: no      Discharge Medications:  See after visit summary for reconciled discharge medications provided to patient and/or family.      **Please Note: This note may have been constructed using a voice recognition system**

## 2024-08-04 NOTE — ASSESSMENT & PLAN NOTE
"Patient endorses upper respiratory infection for past week with episodic palpitations throughout that time.  Patient states on morning of admission she felt like her heart was \"racing\", which caused her to present to the ED for assessment.    In the ED, patient was found to be in A-fib with RVR with rates 170-190.  Patient was administered Cardizem bolus x 2 with improvement and placed on Cardizem drip for further rate control.  Assessment in the ED demonstrates stable blood pressure, without overt subjective symptoms of RVR.    Plan:  Stable for discharge with outpatient cardiology follow up/Zio patch  Follow up results of ECHO outpatient  Continue with cardizem 240 mg daily    "

## 2024-08-05 ENCOUNTER — TRANSITIONAL CARE MANAGEMENT (OUTPATIENT)
Dept: FAMILY MEDICINE CLINIC | Facility: CLINIC | Age: 54
End: 2024-08-05

## 2024-08-05 ENCOUNTER — TELEPHONE (OUTPATIENT)
Age: 54
End: 2024-08-05

## 2024-08-05 NOTE — UTILIZATION REVIEW
NOTIFICATION OF INPATIENT ADMISSION   AUTHORIZATION REQUEST   SERVICING FACILITY:   [place] [address]  Tax ID: [id number]  NPI: [id number]   ATTENDING PROVIDER:  Attending Name and NPI#: Juana Cedillo Md [[id number]]  Address: [address] [place] [address]  Phone: [phone]     ADMISSION INFORMATION:  Place of Service: Inpatient [facility]  Place of Service Code: 21  Inpatient Admission Date/Time: 8/3/24  5:51 AM  Discharge Date/Time: 8/4/2024  5:45 PM  Admitting Diagnosis Code/Description:  Palpitations [R00.2]  Pneumonia [J18.9]  Atrial fibrillation with RVR (HCC) [I48.91]     UTILIZATION REVIEW CONTACT:  Nory Dinh Utilization   Network Utilization Review Department  Phone: [phone]  Fax: [phone]  Email: [email or web address]  Contact for approvals/pending authorizations, clinical reviews, and discharge.     PHYSICIAN ADVISORY SERVICES:  Medical Necessity Denial & Fbpb-ki-Zopj Review  Phone: [phone]  Fax: [phone]  Email: [email or web address]     DISCHARGE SUPPORT TEAM:  For Patients Discharge Needs & Updates  Phone: [phone] opt. 2 Fax: [phone]  Email: [email or web address]

## 2024-08-05 NOTE — TELEPHONE ENCOUNTER
Pt recently was discharged from hospital for n[pneumonia among other things, pt was discharged with a rx for   cefpodoxime (VANTIN) 200 mg tablet and the pharmacy informed her that it was a derivative of penicillin that she is allergic to. Is she able to take this mediation or can Dr Bundy prescribe her something else. Pt called the hospital who told her that she will have to call her PCP. Pt also had a question about her side effects from the medication called   diltiazem (CARDIZEM CD) 240 mg 24 hr capsule   She has swollen ankles. Please advise

## 2024-08-05 NOTE — TELEPHONE ENCOUNTER
Received call from pt's spouse.  He calls regarding pt's hospital discharge from [facility] 8/4, and states pt saw cardiology inpatient.  Pt saw Dr. Castle.  Pt was prescribed cefpodoxime and pharmacy states there is concern with that because pt was prescribed penicillin.    Please advise.

## 2024-08-06 DIAGNOSIS — I48.91 ATRIAL FIBRILLATION WITH RVR (HCC): Primary | ICD-10-CM

## 2024-08-06 LAB
ATRIAL RATE: 102 BPM
ATRIAL RATE: 138 BPM
ATRIAL RATE: 192 BPM
ATRIAL RATE: 48 BPM
ATRIAL RATE: 86 BPM
BACTERIA SPT RESP CULT: ABNORMAL
GRAM STN SPEC: ABNORMAL
P AXIS: 64 DEGREES
P AXIS: 72 DEGREES
P AXIS: 77 DEGREES
PR INTERVAL: 136 MS
PR INTERVAL: 144 MS
PR INTERVAL: 168 MS
QRS AXIS: 50 DEGREES
QRS AXIS: 56 DEGREES
QRS AXIS: 56 DEGREES
QRS AXIS: 65 DEGREES
QRS AXIS: 76 DEGREES
QRSD INTERVAL: 68 MS
QRSD INTERVAL: 68 MS
QRSD INTERVAL: 70 MS
QRSD INTERVAL: 70 MS
QRSD INTERVAL: 76 MS
QT INTERVAL: 262 MS
QT INTERVAL: 262 MS
QT INTERVAL: 308 MS
QT INTERVAL: 356 MS
QT INTERVAL: 382 MS
QTC INTERVAL: 395 MS
QTC INTERVAL: 453 MS
QTC INTERVAL: 457 MS
QTC INTERVAL: 461 MS
QTC INTERVAL: 463 MS
T WAVE AXIS: -21 DEGREES
T WAVE AXIS: -31 DEGREES
T WAVE AXIS: -8 DEGREES
T WAVE AXIS: 16 DEGREES
T WAVE AXIS: 31 DEGREES
VENTRICULAR RATE: 102 BPM
VENTRICULAR RATE: 130 BPM
VENTRICULAR RATE: 137 BPM
VENTRICULAR RATE: 186 BPM
VENTRICULAR RATE: 86 BPM

## 2024-08-06 PROCEDURE — 93010 ELECTROCARDIOGRAM REPORT: CPT | Performed by: INTERNAL MEDICINE

## 2024-08-06 NOTE — TELEPHONE ENCOUNTER
Hi,    The antibiotics were prescribed by the primary team in the hospital, not by cardiology.      From my standpoint however, she was tolerating a cephalosporin antibiotic while in the hospital and her allergies to penicillins.  Crossover reactivity is not 100% with cephalosporins, and therefore I suspect that she will tolerate this medication just fine.  Either way, her allergy to penicillin was hives and medication can be discontinued if she develops hives.    Drake Castle MD     [facility] relying message as given per Dr. Castle, I stated that I will send a f/u [employer] message as well.

## 2024-08-07 ENCOUNTER — TELEPHONE (OUTPATIENT)
Dept: CARDIOLOGY CLINIC | Facility: CLINIC | Age: 54
End: 2024-08-07

## 2024-08-07 NOTE — TELEPHONE ENCOUNTER
Sent prior auth request to [employer] for a 14 day Zio XT. Per [facility]'s this is their preferred way to submit prior auths.

## 2024-08-07 NOTE — TELEPHONE ENCOUNTER
----- Message from Marlene FREEDMAN sent at 8/7/2024  7:39 AM EDT -----    ----- Message -----  From: Drake Castle MD  Sent: 8/6/2024   4:55 PM EDT  To: Cardiology [place] [facility]    New order placed for a Zio patch XT to be worn for 14 days.  I saw this patient in the hospital, and she is scheduled to see me later this month in the office.  I was hoping to get the Zio patch processed through insurance so that it may be fitted to her in the office when we see her.  Thanks.    TS

## 2024-08-07 NOTE — PATIENT INSTRUCTIONS
Continue supportive care including plenty of fluids, small meals as tolerated, Tylenol/Motrin as needed for pain and fever, nasal spray such as flonase 2x daily as needed for congestion and post nasal drip, can take daily allergy medication as directed, multi-symptom cold and flu medication as needed. Obtain Flu/COVID swab and chest xray. Go to ER if development of worsening symptoms, development of shortness of breath, chest pain, wheezing, heart racing, dizziness, blurry vision, or fainting.

## 2024-08-08 ENCOUNTER — OFFICE VISIT (OUTPATIENT)
Dept: FAMILY MEDICINE CLINIC | Facility: CLINIC | Age: 54
End: 2024-08-08
Payer: COMMERCIAL

## 2024-08-08 VITALS
WEIGHT: 210 LBS | HEIGHT: 64 IN | OXYGEN SATURATION: 97 % | RESPIRATION RATE: 16 BRPM | DIASTOLIC BLOOD PRESSURE: 100 MMHG | TEMPERATURE: 97.8 F | BODY MASS INDEX: 35.85 KG/M2 | SYSTOLIC BLOOD PRESSURE: 164 MMHG | HEART RATE: 77 BPM

## 2024-08-08 DIAGNOSIS — F41.1 GENERALIZED ANXIETY DISORDER: ICD-10-CM

## 2024-08-08 DIAGNOSIS — E83.42 HYPOMAGNESEMIA: ICD-10-CM

## 2024-08-08 DIAGNOSIS — F41.1 GAD (GENERALIZED ANXIETY DISORDER): Primary | ICD-10-CM

## 2024-08-08 DIAGNOSIS — J18.9 PNEUMONIA OF LEFT LOWER LOBE DUE TO INFECTIOUS ORGANISM: ICD-10-CM

## 2024-08-08 DIAGNOSIS — I48.91 ATRIAL FIBRILLATION WITH RVR (HCC): ICD-10-CM

## 2024-08-08 LAB
BACTERIA BLD CULT: NORMAL
BACTERIA BLD CULT: NORMAL

## 2024-08-08 PROCEDURE — 99496 TRANSJ CARE MGMT HIGH F2F 7D: CPT | Performed by: PHYSICIAN ASSISTANT

## 2024-08-08 RX ORDER — HYDROXYZINE HYDROCHLORIDE 25 MG/1
25 TABLET, FILM COATED ORAL
Qty: 30 TABLET | Refills: 1 | Status: SHIPPED | OUTPATIENT
Start: 2024-08-08

## 2024-08-08 NOTE — PROGRESS NOTES
"Transition of Care Visit  Name: Leeanna Lacy      : 1970      MRN: [id number]  Encounter Provider: Devora Bundy PA-C  Encounter Date: 2024   Encounter department: [facility]    Assessment & Plan     Atrial fibrillation with RVR ([facility]) - Stable for discharge with outpatient cardiology follow up/Zio patch, Continue with cardizem 240 mg daily and will follow up with Cardiologist in August        Pneumonia involving left lobe - Ceftriaxone switched to cefpodoxime on dc for total 5 day course which is now finished, cultures negative, repeat CXR in 6 weeks. Reassurance offered.      Hypomagnesemia - Replaced inpatient, Stable for discharge     Generalized anxiety disorder - Continue home sertraline 25 mg       F/u as needed    History of Present Illness     Transitional Care Management Review:   Leeanna Lacy is a 54 y.o. female here for TCM follow up.     During the TCM phone call patient stated:  TCM Call       Date and time call was made  2024  9:04 AM    Hospital care reviewed  Records reviewed    Patient was hospitialized at  [facility]    Date of Admission  24    Date of discharge  24    Diagnosis  Atrial fibrillation with RVR    Disposition  Home          TCM Call       Scheduled for follow up?  Yes    I have advised the patient to call PCP with any new or worsening symptoms  Laquitaarianne De Los Santos [facility]            Patient endorses upper respiratory infection for past week with episodic palpitations throughout that time.  Patient states on morning of admission she felt like her heart was \"racing\", which caused her to present to the ED for assessment.     In the ED, patient was found to be in A-fib with RVR with rates 170-190.  Patient was administered Cardizem bolus x 2 with improvement and placed on Cardizem drip for further rate control.     Patient discharged home on antibiotics which she has finished, cardiazem which she is tolerating " "and follow up with cardiology.         Review of Systems  Objective     /100   Pulse 77   Temp 97.8 °F (36.6 °C) (Temporal)   Resp 16   Ht 5' 4\" (1.626 m)   Wt 95.3 kg (210 lb)   LMP 05/31/2022 (Exact Date)   SpO2 97%   BMI 36.05 kg/m²     Physical Exam  Constitutional:       Appearance: Normal appearance. She is well-developed and normal weight.   HENT:      Head: Normocephalic and atraumatic.   Neck:      Vascular: No carotid bruit.   Cardiovascular:      Rate and Rhythm: Normal rate and regular rhythm.      Pulses: Normal pulses.      Heart sounds: Normal heart sounds.   Pulmonary:      Effort: Pulmonary effort is normal. No respiratory distress.      Comments: Crackles on base of left lung  Musculoskeletal:         General: Normal range of motion.      Cervical back: Normal range of motion and neck supple.      Right lower leg: No edema.      Left lower leg: No edema.   Skin:     General: Skin is warm.   Neurological:      General: No focal deficit present.      Mental Status: She is alert and oriented to person, place, and time.   Psychiatric:         Mood and Affect: Mood normal.         Behavior: Behavior normal.         Thought Content: Thought content normal.         Judgment: Judgment normal.       Medications have been reviewed by provider in current encounter    Administrative Statements         "

## 2024-08-14 NOTE — TELEPHONE ENCOUNTER
Received fax with approval dates from: 8/22/24-11/22/24. For CPT code 49241. Auth#: [id number]. Zio can be ordered within those dates, can apply at next ov.

## 2024-08-20 ENCOUNTER — TELEPHONE (OUTPATIENT)
Dept: CARDIOLOGY CLINIC | Facility: CLINIC | Age: 54
End: 2024-08-20

## 2024-08-20 NOTE — TELEPHONE ENCOUNTER
AMB Extended Holter Monitor ([id number]) and ([id number]):    Authorization #[id number]  From 8/13/2024 until 11/13/2024

## 2024-08-26 ENCOUNTER — OFFICE VISIT (OUTPATIENT)
Dept: CARDIOLOGY CLINIC | Facility: CLINIC | Age: 54
End: 2024-08-26
Payer: COMMERCIAL

## 2024-08-26 VITALS
OXYGEN SATURATION: 96 % | BODY MASS INDEX: 36.02 KG/M2 | SYSTOLIC BLOOD PRESSURE: 154 MMHG | DIASTOLIC BLOOD PRESSURE: 92 MMHG | HEART RATE: 81 BPM | WEIGHT: 211 LBS | HEIGHT: 64 IN

## 2024-08-26 DIAGNOSIS — I48.91 ATRIAL FIBRILLATION WITH RVR (HCC): ICD-10-CM

## 2024-08-26 DIAGNOSIS — I48.0 PAROXYSMAL ATRIAL FIBRILLATION (HCC): Primary | ICD-10-CM

## 2024-08-26 DIAGNOSIS — R06.83 SNORING: ICD-10-CM

## 2024-08-26 DIAGNOSIS — R03.0 ELEVATED BLOOD PRESSURE READING IN OFFICE WITHOUT DIAGNOSIS OF HYPERTENSION: ICD-10-CM

## 2024-08-26 PROCEDURE — 99214 OFFICE O/P EST MOD 30 MIN: CPT | Performed by: INTERNAL MEDICINE

## 2024-08-26 RX ORDER — DILTIAZEM HYDROCHLORIDE 240 MG/1
240 CAPSULE, COATED, EXTENDED RELEASE ORAL DAILY
Qty: 90 CAPSULE | Refills: 3 | Status: SHIPPED | OUTPATIENT
Start: 2024-08-26

## 2024-08-26 NOTE — PATIENT INSTRUCTIONS
You were seen today in the Cardiology office for follow up evaluation.     Please continue your current cardiac medications as prescribed.    1.  Please check your blood pressure using an automatic upper arm cuff. If you do not have one, I recommend purchasing an [employer] blood pressure unit with standard size upper arm cuff.  2.  Take blood pressures after resting for at least 5 minutes while seated in a chair or at a table with arm supported on arm rest or table.    3.  Write down each and every reading with date and time.    Thank you for choosing [facility].    Please call our office or use [employer] with any questions.

## 2024-08-26 NOTE — PROGRESS NOTES
[facility]    Name:Leeanna Lacy   DOS: 8/26/2024     Chief Complaint:   Chief Complaint   Patient presents with    Hospital Follow-up     2-4 week hosp. F/u.    Palpitations     Off and on at random, more at night and with anxiety.     Migraine     Has been having headaches almost everyday.     Fatigue     A lot more fatigue as of late.        HISTORY OF PRESENT ILLNESS:    HPI:  Leeanna Lacy is a 54 y.o. female. She  has a past medical history of Abnormal weight gain, Allergic (Child), Anxiety, Arthralgia, GERD (gastroesophageal reflux disease), Panic attack, and Stomatitis.    She presents for follow up evaluation. The patient is known to me from her recent hospitalization 8/3/2024 when she had presented to the hospital for evaluation of severe palpitations that awoke her from sleep. She was newly diagnosed with atrial fibrillation at that time, with rapid ventricular rates.  She spontaneously converted to sinus rhythm in the hospital, and was discharged on an oral calcium channel blocker for rate control strategy.  Oral anticoagulation was not initiated in the hospital given [id number] score of 1, although we did discuss blood pressure monitoring to ensure that she is not truly hypertensive as this would be an additional point.    Today, the patient reports that she is doing well overall.  She has no active cardiopulmonary complaints, and has had no recurrent sustained high rate episodes.  She denies chest pain, shortness of breath, diaphoresis, dizziness, orthopnea, edema.  She has had no syncopal episodes.  No issues with bruising or bleeding.  She does report occasional intermittent single palpitations, no sustained episodes.    Has been tolerating once daily diltiazem well, but does note that she has been experiencing almost daily headaches since starting calcium channel blocker therapy.  Takes her medication in the morning.       ROS    ROS: Pertinent positives and negatives as  described in History of Present Illness. Remainder of a 14 point review of systems was negative.     Allergies   Allergen Reactions    Codeine Tachycardia    Penicillins Hives    Prednisone Irritability and Hyperactivity    Sulfa Antibiotics Hives    Tetracycline         Current Outpatient Medications on File Prior to Visit   Medication Sig Dispense Refill    calcium carbonate (OS-ERICKA) 600 MG tablet Take 1 tablet (600 mg total) by mouth 2 (two) times a day with meals      diltiazem (CARDIZEM CD) 240 mg 24 hr capsule Take 1 capsule (240 mg total) by mouth in the morning 30 capsule 0    esomeprazole (NexIUM) 20 mg packet 20 mg      multivitamin (THERAGRAN) TABS Take 1 tablet by mouth daily      sertraline (ZOLOFT) 50 mg tablet Take 1 tablet (50 mg total) by mouth daily (Patient taking differently: Take 25 mg by mouth daily) 90 tablet 3    dextromethorphan-guaiFENesin (ROBITUSSIN DM)  mg/5 mL syrup Take 10 mL by mouth every 4 (four) hours as needed for cough or congestion 118 mL 0    estradiol (ESTRACE VAGINAL) 0.1 mg/g vaginal cream Insert 1 g into the vagina daily for 14 days, THEN 1 g 2 (two) times a week. (Patient not taking: Reported on 8/8/2024) 42.5 g 3    fluocinolone acetonide (DermOtic) 0.01 % otic oil Administer 5 drops into both ears 2 (two) times a day (Patient not taking: Reported on 8/3/2024) 20 mL 0    hydrOXYzine HCL (ATARAX) 25 mg tablet Take 1 tablet (25 mg total) by mouth daily at bedtime as needed for anxiety 30 tablet 1     No current facility-administered medications on file prior to visit.       Past Medical History:   Diagnosis Date    Abnormal weight gain     Allergic Child    Anxiety     Arthralgia     GERD (gastroesophageal reflux disease)     Panic attack     Stomatitis     last assessed 7/22/13; resolved 5/4/16       Past Surgical History:   Procedure Laterality Date    CHOLECYSTECTOMY      CHOLECYSTECTOMY LAPAROSCOPIC      WISDOM TOOTH EXTRACTION         Family History   Problem  Relation Age of Onset    Diabetes Mother     Breast cancer Mother 74    Asthma Mother     Hypertension Mother     Heart failure Father     Coronary artery disease Father     Diabetes Father             Other Father         pure hypercholesterolemia    No Known Problems Daughter     No Known Problems Daughter     Pancreatic cancer Paternal Grandmother     No Known Problems Maternal Aunt     No Known Problems Maternal Aunt     Breast cancer Paternal Aunt     Cancer Family     Mental illness Neg Hx     Substance Abuse Neg Hx     Colon cancer Neg Hx     Ovarian cancer Neg Hx     Endometrial cancer Neg Hx        Social History     Socioeconomic History    Marital status: /Civil Union     Spouse name: Not on file    Number of children: Not on file    Years of education: Not on file    Highest education level: Not on file   Occupational History    Not on file   Tobacco Use    Smoking status: Never    Smokeless tobacco: Never   Vaping Use    Vaping status: Never Used   Substance and Sexual Activity    Alcohol use: Yes     Alcohol/week: 1.0 standard drink of alcohol     Types: 1 Glasses of wine per week     Comment: Social    Drug use: No    Sexual activity: Yes     Partners: Male     Birth control/protection: Male Sterilization   Other Topics Concern    Not on file   Social History Narrative    Caffeine use - Nominal     Uses safety equipment - smoke detectors     Uses seat belts      Social Determinants of Health     Financial Resource Strain: Not on file   Food Insecurity: Not on file   Transportation Needs: Not on file   Physical Activity: Not on file   Stress: Not on file   Social Connections: Not on file   Intimate Partner Violence: Not on file   Housing Stability: Not on file       OBJECTIVE:    /92 (BP Location: Left arm, Patient Position: Sitting, Cuff Size: Standard)   Pulse 81   Wt 95.7 kg (211 lb)   LMP 2022 (Exact Date)   SpO2 96%   BMI 36.22 kg/m²      BP Readings from Last 3  Encounters:   08/26/24 154/92   08/08/24 164/100   08/04/24 141/83       Wt Readings from Last 3 Encounters:   08/26/24 95.7 kg (211 lb)   08/08/24 95.3 kg (210 lb)   08/04/24 95.3 kg (210 lb)         Physical Exam  Vitals reviewed.   Constitutional:       General: She is not in acute distress.     Appearance: Normal appearance. She is not diaphoretic.   HENT:      Head: Normocephalic and atraumatic.   Eyes:      Conjunctiva/sclera: Conjunctivae normal.   Neck:      Vascular: No JVD.   Cardiovascular:      Rate and Rhythm: Normal rate and regular rhythm.      Pulses: Normal pulses.      Heart sounds: Normal heart sounds. No murmur heard.     No friction rub. No gallop.   Pulmonary:      Effort: Pulmonary effort is normal.      Breath sounds: Normal breath sounds. No wheezing, rhonchi or rales.   Abdominal:      General: Abdomen is flat. Bowel sounds are normal.   Musculoskeletal:      Right lower leg: No edema.      Left lower leg: No edema.   Skin:     General: Skin is warm and dry.   Neurological:      General: No focal deficit present.      Mental Status: She is alert and oriented to person, place, and time.   Psychiatric:         Mood and Affect: Mood normal.         Behavior: Behavior normal.                                                       Cardiac testing:   EKG reviewed personally as performed on 8/3/24. My read: NSR. Poor anterior R-wave progression.     ECHO 8/4/24 personally reviewed:  Left Ventricle Left ventricular cavity size is normal. Wall thickness is normal. The left ventricular ejection fraction is 65-70%. Systolic function is vigorous. Wall motion is normal. Diastolic function is normal.   Right Ventricle Right ventricular cavity size is normal. Systolic function is normal.   Left Atrium The atrium is mildly dilated.   Right Atrium The atrium is normal in size.   Aortic Valve The aortic valve is trileaflet. The leaflets are not thickened. The leaflets are not calcified. The leaflets exhibit  normal mobility. There is no evidence of regurgitation. The aortic valve has no significant stenosis.   Mitral Valve Mitral valve structure is normal.  There is trace regurgitation. There is no evidence of stenosis.   Tricuspid Valve Tricuspid valve structure is normal. There is trace regurgitation. There is no evidence of stenosis. The right ventricular systolic pressure is normal. The estimated right ventricular systolic pressure is 31.00 mmHg.   Pulmonic Valve The pulmonic valve was not well visualized. There is no evidence of regurgitation. There is no evidence of stenosis.   Ascending Aorta The aortic root is normal in size.   IVC/SVC The right atrial pressure is estimated at 3.0 mmHg. The inferior vena cava is normal in size.   Pericardium There is no pericardial effusion. The pericardium is normal in appearance.           LABS:  Lab Results   Component Value Date    GLUCOSE 99 07/31/2015    BUN 8 08/04/2024    CREATININE 0.52 (L) 08/04/2024    CALCIUM 8.6 08/04/2024     07/31/2015    K 3.5 08/04/2024    CO2 25 08/04/2024     08/04/2024    ALKPHOS 52 08/03/2024    BILITOT 0.31 07/31/2015    PROT 7.7 07/31/2015    AST 18 08/03/2024    ALT 16 08/03/2024    ANIONGAP 6 07/31/2015        Lab Results   Component Value Date    WBC 8.58 08/04/2024    HGB 13.0 08/04/2024    HCT 38.7 08/04/2024    MCV 89 08/04/2024     08/04/2024       Lab Results   Component Value Date    CHOL 171 07/31/2015    HDL 51 08/25/2023    LDLCALC 126 (H) 08/25/2023    TRIG 145 08/25/2023       Lab Results   Component Value Date    HGBA1C 6.0 (H) 08/25/2023       ASSESSMENT/PLAN:  54-year-old female presenting for follow-up evaluation.  Doing well overall with no recurrent atrial fibrillation.  She has no active cardiopulmonary complaints.Diagnoses and all orders for this visit:    Paroxysmal atrial fibrillation (HCC)  -     Ambulatory referral to Cardiology    Elevated blood pressure reading in office without diagnosis of  hypertension    Atrial fibrillation with RVR (HCC)  -     diltiazem (CARDIZEM CD) 240 mg 24 hr capsule; Take 1 capsule (240 mg total) by mouth in the morning  -     Ambulatory Referral to Sleep Medicine; Future    Snoring    54-year-old female presenting for follow-up evaluation.  She is doing well overall, with no recurrent atrial fibrillation and no active cardiopulmonary complaints.  She has been tolerating Cardizem 240 mg once daily taken in the morning, although is concerned that this may be contributing to headaches.  Although Desizon is a calcium channel blocker with less vasodilating properties, I did advise that is certainly a possibility that this medication may be causing a vasodilatory headache.  I recommended first trying to just switch from taking the medication in the morning to taking in the evening.  If this does not resolve her symptoms, I recommend a holiday from diltiazem for 2-3 days to see if headache frequency subsides.  If this is the case, I will plan to switch her to Toprol-XL as an alternative therapy.    Her Zio patch XT was fitted in office today, and she will wear this for 14 days to assess underlying arrhythmia burden.  If no atrial fibrillation is detected by Zio patch, I would then refer the patient for an implanted loop recorder for underlying rhythm surveillance.    I advised that she maintain a home blood pressure log.  She currently checks her blood pressure with a wrist cuff and is getting readings in the 100-1 15 systolic range at home.  Review of blood pressure readings in the office and hospital setting demonstrate numbers consistent with mild hypertension.  We will review her home blood pressure log checked with a new upper arm blood pressure cuff at the next office visit.  If she is truly hypertensive, this will give her a [id number] score of 2 warranting further consideration for oral anticoagulation.    The patient's spouse reports that she snores at night, and in the  "setting of a mildly dilated left atrial cavity and recently diagnosed atrial fibrillation I recommend a sleep study to exclude obstructive sleep apnea.  I have given the patient referral to establish care with sleep medicine to determine the appropriateness of a sleep study.         Drake Castle MD      Portions of the record may have been created with voice recognition software. Occasional wrong word or \"sound alike\" substitutions may have occurred due to the inherent limitations of voice recognition software. Please review the chart carefully and recognize, using context, where substitutions/typographical errors may have occurred.   "

## 2024-08-30 DIAGNOSIS — E66.01 CLASS 2 SEVERE OBESITY DUE TO EXCESS CALORIES WITH SERIOUS COMORBIDITY AND BODY MASS INDEX (BMI) OF 36.0 TO 36.9 IN ADULT (HCC): Primary | ICD-10-CM

## 2024-09-02 PROBLEM — J18.9 PNEUMONIA INVOLVING LEFT LUNG: Status: RESOLVED | Noted: 2024-08-03 | Resolved: 2024-09-02

## 2024-09-06 ENCOUNTER — APPOINTMENT (OUTPATIENT)
Dept: LAB | Age: 54
End: 2024-09-06
Payer: COMMERCIAL

## 2024-09-06 DIAGNOSIS — Z00.8 HEALTH EXAMINATION IN POPULATION SURVEY: ICD-10-CM

## 2024-09-06 LAB
CHOLEST SERPL-MCNC: 203 MG/DL
EST. AVERAGE GLUCOSE BLD GHB EST-MCNC: 134 MG/DL
HBA1C MFR BLD: 6.3 %
HDLC SERPL-MCNC: 46 MG/DL
LDLC SERPL CALC-MCNC: 122 MG/DL (ref 0–100)
NONHDLC SERPL-MCNC: 157 MG/DL
TRIGL SERPL-MCNC: 174 MG/DL

## 2024-09-06 PROCEDURE — 80061 LIPID PANEL: CPT

## 2024-09-06 PROCEDURE — 36415 COLL VENOUS BLD VENIPUNCTURE: CPT

## 2024-09-06 PROCEDURE — 83036 HEMOGLOBIN GLYCOSYLATED A1C: CPT

## 2024-09-11 ENCOUNTER — APPOINTMENT (OUTPATIENT)
Dept: RADIOLOGY | Facility: MEDICAL CENTER | Age: 54
End: 2024-09-11
Payer: COMMERCIAL

## 2024-09-11 DIAGNOSIS — J18.9 PNEUMONIA OF LEFT LOWER LOBE DUE TO INFECTIOUS ORGANISM: ICD-10-CM

## 2024-09-11 PROCEDURE — 71046 X-RAY EXAM CHEST 2 VIEWS: CPT

## 2024-09-16 ENCOUNTER — CLINICAL SUPPORT (OUTPATIENT)
Dept: CARDIOLOGY CLINIC | Facility: CLINIC | Age: 54
End: 2024-09-16
Payer: COMMERCIAL

## 2024-09-16 DIAGNOSIS — I48.91 ATRIAL FIBRILLATION WITH RVR (HCC): ICD-10-CM

## 2024-09-16 PROCEDURE — 93248 EXT ECG>7D<15D REV&INTERPJ: CPT | Performed by: INTERNAL MEDICINE

## 2024-09-23 DIAGNOSIS — I49.3 SYMPTOMATIC PVCS: Primary | ICD-10-CM

## 2024-09-23 DIAGNOSIS — I48.0 PAROXYSMAL ATRIAL FIBRILLATION (HCC): ICD-10-CM

## 2024-09-23 RX ORDER — METOPROLOL TARTRATE 75 MG/1
75 TABLET, FILM COATED ORAL EVERY 12 HOURS SCHEDULED
Qty: 60 TABLET | Refills: 5 | Status: SHIPPED | OUTPATIENT
Start: 2024-09-23

## 2024-09-23 NOTE — PROGRESS NOTES
I called and spoke with the patient regarding the results of her 14-day cardiac patch rhythm monitor.  Single episode 4 beat NSVT noted.  Differential includes true NSVT versus aberrancy.  I have recommended transitioning from diltiazem to Lopressor 75 mg twice daily.  Repeat 14-day cardiac patch rhythm monitor to ensure adequate PVC and arrhythmia suppression.  Patient has no anginal complaints, preserved LV systolic function. Asymptomatic from a cardiac standpoint. Did discuss the role for cardiovascular stress testing if there is recurrent NSVT noted on her repeat monitor.    Drake Castle MD

## 2024-09-27 ENCOUNTER — TELEPHONE (OUTPATIENT)
Dept: CARDIOLOGY CLINIC | Facility: CLINIC | Age: 54
End: 2024-09-27

## 2024-09-27 DIAGNOSIS — I48.0 PAROXYSMAL ATRIAL FIBRILLATION (HCC): ICD-10-CM

## 2024-09-27 DIAGNOSIS — I49.3 SYMPTOMATIC PVCS: Primary | ICD-10-CM

## 2024-09-27 NOTE — TELEPHONE ENCOUNTER
----- Message from Drake Castle MD sent at 9/27/2024 11:24 AM EDT -----  I ordered a repeat 14-day Zio patch XT.    TS

## 2024-09-30 ENCOUNTER — NURSE TRIAGE (OUTPATIENT)
Age: 54
End: 2024-09-30

## 2024-09-30 DIAGNOSIS — I48.0 PAROXYSMAL ATRIAL FIBRILLATION (HCC): ICD-10-CM

## 2024-09-30 DIAGNOSIS — I49.3 SYMPTOMATIC PVCS: Primary | ICD-10-CM

## 2024-09-30 NOTE — TELEPHONE ENCOUNTER
"Reason for Disposition   Heart beating very slowly (e.g., < 50 / minute) (Exception: athlete and heart rate normal for caller)    Answer Assessment - Initial Assessment Questions  Chief Complaint: bradycardia    History of Present Illness (HPI): Afib    VS/Weight:     Pain: no    Risk Factors:     Recent Testing:     Medicine:     Upcoming Office Visit:     Last Office Visit:     Additional Comments:   Concerned about HR's dropping into the 49's   Did advise to continue all medications until we discuss with provider  Advised to hydrate recommended 64 oz, pt states only drinks maybe about 24 oz.     1. DESCRIPTION: \"Please describe your heart rate or heartbeat that you are having\" (e.g., fast/slow, regular/irregular, skipped or extra beats, \"palpitations\")      slow  2. ONSET: \"When did it start?\" (Minutes, hours or days)       Since switch medication       3. DURATION: \"How long does it last\" (e.G., seconds, minutes, hours)        4. PATTERN \"Does it come and go, or has it been constant since it started?\"  \"Does it get worse with exertion?\"   \"Are you feeling it now?\"      Episodes of sweaty, if bends down occasionally gets dizzy or lightheaded from a sitting to standing position   5. TAP: \"Using your hand, can you tap out what you are feeling on a chair or table in front of you, so that I can hear?\" (Note: not all patients can do this)          6. HEART RATE: \"Can you tell me your heart rate?\" \"How many beats in 15 seconds?\"  (Note: not all patients can do this)          7. RECURRENT SYMPTOM: \"Have you ever had this before?\" If Yes, ask: \"When was the last time?\" and \"What happened that time?\"       Noticing sent switched medication   8. CAUSE: \"What do you think is causing the palpitations?\"      Switch medications noticing HR dropping 49-50 's  9. CARDIAC HISTORY: \"Do you have any history of heart disease?\" (e.g., heart attack, angina, bypass surgery, angioplasty, arrhythmia)         10. OTHER SYMPTOMS: \"Do you " "have any other symptoms?\" (e.g., dizziness, chest pain, sweating, difficulty breathing)        Sweating  Off balance sitting to standing occasionally   Especially if bends over  Denies any sob    Protocols used: Heart Rate and Heartbeat Questions-ADULT-[place]    "

## 2024-10-01 ENCOUNTER — CLINICAL SUPPORT (OUTPATIENT)
Dept: BARIATRICS | Facility: CLINIC | Age: 54
End: 2024-10-01
Payer: COMMERCIAL

## 2024-10-01 VITALS — BODY MASS INDEX: 36.43 KG/M2 | HEIGHT: 64 IN | WEIGHT: 213.4 LBS

## 2024-10-01 DIAGNOSIS — E66.812 CLASS 2 OBESITY DUE TO EXCESS CALORIES WITH BODY MASS INDEX (BMI) OF 37.0 TO 37.9 IN ADULT, UNSPECIFIED WHETHER SERIOUS COMORBIDITY PRESENT: Primary | ICD-10-CM

## 2024-10-01 DIAGNOSIS — E66.09 CLASS 2 OBESITY DUE TO EXCESS CALORIES WITH BODY MASS INDEX (BMI) OF 37.0 TO 37.9 IN ADULT, UNSPECIFIED WHETHER SERIOUS COMORBIDITY PRESENT: Primary | ICD-10-CM

## 2024-10-01 DIAGNOSIS — E66.01 CLASS 2 SEVERE OBESITY DUE TO EXCESS CALORIES WITH SERIOUS COMORBIDITY AND BODY MASS INDEX (BMI) OF 36.0 TO 36.9 IN ADULT (HCC): ICD-10-CM

## 2024-10-01 DIAGNOSIS — E66.812 CLASS 2 SEVERE OBESITY DUE TO EXCESS CALORIES WITH SERIOUS COMORBIDITY AND BODY MASS INDEX (BMI) OF 36.0 TO 36.9 IN ADULT (HCC): ICD-10-CM

## 2024-10-01 PROCEDURE — RECHECK

## 2024-10-01 PROCEDURE — 97802 MEDICAL NUTRITION INDIV IN: CPT

## 2024-10-01 NOTE — PROGRESS NOTES
"Weight Management Medical Nutrition Assessment  Leeanna presented today for meal planning session, however, after explanation of the MW programs offered, she decided to convert appointment to new [place] [facility]. She did not see MWM provider prior to this visit. Today on Tanita scale she weighed 213.4#. Will review the rest of body composition results at 2-week follow-up. She plans to start class this week (10/3) as she will be unable to make class next week (10/10) d/t going away. Works full-time from home; sedentary. Pertinent documented medical hx includes new atrial fibrillation with RVR. Abnormal lab values 9/6/24 include cholesterol 203, , HDL 46, . She is not interested in weight loss medications at this time. Diet recall reveals inadequate intake of fruits and vegetables and greater than desirable intake of processed carbohydrates. Provided Leeanna with and reviewed Healthy Core program materials including calorie-controlled, balanced meal plan. Leeanna with good understanding. She plans to better balance her meals and begin exercising. Follow-up scheduled for 10/17/24.         Patient seen by Medical Provider in past 6 months:  yes  Requested to schedule appointment with Medical Provider: No      Anthropometric Measurements  Start Weight (#): 213.4#  Current Weight (#): as above  TBW % Change from start weight:n/a  Ideal Body Weight (#):117.5# (53.4 kg)  Goal Weight (#):150#   Highest:221#  Lowest: 142#    Weight Loss History  Previous weight loss attempts: Weight Watchers \"here and there\"    Food and Nutrition Related History  Wake up: 7 AM; starts work at 8 AM   Bed Time:10 PM; does not sleep well; seeing pulmonologist; stated she began having sleeping changes since starting menopause (2023); no dx sleep apnea; no overnight eating    Food Recall  Breakfast:10-11 AM- 2 oatmeal packets (maple and brown sugar) with fruit (banana or peaches) OR bagel with butter " spread    Snack:none  Lunch:1:30 PM- leftovers or turkey, cheese sandwich on rye, mustard, tomato with a pickle or baked chips; sparkling water   Snack:none  Dinner:6:30 PM- fish/chicken/pork/beef, pasta or rice, vegetables; no dessert  Snack: 100 kcal sherbet pop      Beverages: water- 24 oz, sparkling- 12 oz, no coffee or tea; no ETOH  Volume of beverage intake: 36 oz    Weekends: Same  Cravings: sometimes sweets  Trouble area of day:while working     Frequency of Eating out: once weekly (sit-down meal)  Food restrictions: none  Cooking: self   Food Shopping: self    Physical Activity Intake  Activity: none   Frequency:n/a  Physical limitations/barriers to exercise: bad lower back, but state no real limitations    Estimated Needs  Energy  [place]ita: BMR:1648      X 1.3 -1000 = 1142  [address] [place] Energy Needs: BMR: 1541   1-2# loss weekly sedentary:  849-1349             1-2# loss weekly lightly active:2678-1926  Maintenance calories for sedentary activity level: 1849  Protein:64-80 g      (1.2-1.5g/kg IBW)  Fluid: >=62 oz     (35mL/kg IBW)    Nutrition Diagnosis  Yes;    Overweight/obesity  related to Excess energy intake as evidenced by  BMI more than normative standard for age and sex (obesity-grade II 35-39.9)       Nutrition Intervention    Nutrition Prescription  Calories:6756-6083, flex up 200 kcal with exercise  Protein:~65-80 g  Fluid:>=64 oz    Meal Plan (Tung/Pro/Carb)  Provided Leeanna with 8538-1270 kcal sample meal plan.     Nutrition Education:    Healthy Core Manual  Calorie controlled menu  Lean protein food choices  Healthy snack options  Food journaling tips      Nutrition Counseling:  Strategies: meal planning, portion sizes, healthy snack choices, hydration, fiber intake, protein intake, exercise, food journal      Monitoring and Evaluation:  Evaluation criteria:  Energy Intake  Meet protein needs  Maintain adequate hydration  Monitor weekly weight  Meal planning/preparation  Food journal    Decreased portions at mealtimes and snacks  Physical activity     Barriers to learning:none  Readiness to change: Preparation:  (Getting ready to change)   Comprehension: good  Expected Compliance: good

## 2024-10-02 RX ORDER — METOPROLOL TARTRATE 50 MG
50 TABLET ORAL 2 TIMES DAILY
Qty: 180 TABLET | Refills: 1 | Status: SHIPPED | OUTPATIENT
Start: 2024-10-02

## 2024-10-09 ENCOUNTER — TELEPHONE (OUTPATIENT)
Dept: CARDIOLOGY CLINIC | Facility: CLINIC | Age: 54
End: 2024-10-09

## 2024-10-09 NOTE — TELEPHONE ENCOUNTER
SKYE Exteneded Holter Monitor ([id number]) and ([id number])    APPROVED #[id number]  From 9/30/2024 until 12/31/2024  Per Dayanna  (Medical Management)

## 2024-10-22 ENCOUNTER — TELEPHONE (OUTPATIENT)
Dept: BARIATRICS | Facility: CLINIC | Age: 54
End: 2024-10-22

## 2024-10-22 NOTE — TELEPHONE ENCOUNTER
Received email this morning from Leeanna as notification that she would be unable to join the Healthy Core program at this time as she has a lot going on right now, with possible consideration of joining in the new year.

## 2024-10-25 ENCOUNTER — CLINICAL SUPPORT (OUTPATIENT)
Dept: CARDIOLOGY CLINIC | Facility: CLINIC | Age: 54
End: 2024-10-25
Payer: COMMERCIAL

## 2024-10-25 DIAGNOSIS — I49.3 SYMPTOMATIC PVCS: ICD-10-CM

## 2024-10-25 DIAGNOSIS — I48.0 PAROXYSMAL ATRIAL FIBRILLATION (HCC): ICD-10-CM

## 2024-10-25 PROCEDURE — 93248 EXT ECG>7D<15D REV&INTERPJ: CPT | Performed by: INTERNAL MEDICINE

## 2024-11-01 DIAGNOSIS — F41.1 GENERALIZED ANXIETY DISORDER: ICD-10-CM

## 2024-11-03 ENCOUNTER — OFFICE VISIT (OUTPATIENT)
Dept: URGENT CARE | Facility: MEDICAL CENTER | Age: 54
End: 2024-11-03
Payer: COMMERCIAL

## 2024-11-03 VITALS
OXYGEN SATURATION: 99 % | TEMPERATURE: 98.9 F | HEART RATE: 50 BPM | RESPIRATION RATE: 20 BRPM | DIASTOLIC BLOOD PRESSURE: 74 MMHG | SYSTOLIC BLOOD PRESSURE: 116 MMHG

## 2024-11-03 DIAGNOSIS — L24.7 IRRITANT CONTACT DERMATITIS DUE TO PLANTS, EXCEPT FOOD: Primary | ICD-10-CM

## 2024-11-03 PROCEDURE — 99213 OFFICE O/P EST LOW 20 MIN: CPT | Performed by: PHYSICIAN ASSISTANT

## 2024-11-03 NOTE — PROGRESS NOTES
[facility]        NAME: Leeanna Lacy is a 54 y.o. female  : 1970    MRN: [id number]  DATE: November 3, 2024  TIME: 9:56 AM    Assessment and Plan   Irritant contact dermatitis due to plants, except food [L24.7]  1. Irritant contact dermatitis due to plants, except food              Patient Instructions       Follow up with PCP in 3-5 days.  Proceed to  ER if symptoms worsen.    If tests have been performed at [facility], our office will contact you with results if changes need to be made to the care plan discussed with you at the visit.  You can review your full results on [facility].    Chief Complaint     Chief Complaint   Patient presents with    Rash     Patient states she has worsening rash over the last 1.5 weeks; states she has been outside working in the yard; unsure if it is poison or something else; itchy, red          History of Present Illness       Patient complains of an itchy rash over the past week and a half.  Patient getting some new areas pop up more recently.  She was out in the woods clearing brush prior to the rash starting.    Rash        Review of Systems   Review of Systems   Constitutional: Negative.    HENT: Negative.     Eyes: Negative.    Respiratory: Negative.     Cardiovascular: Negative.    Gastrointestinal: Negative.    Musculoskeletal: Negative.    Skin:  Positive for rash. Negative for color change.   Neurological: Negative.          Current Medications       Current Outpatient Medications:     calcium carbonate (OS-ERICKA) 600 MG tablet, Take 1 tablet (600 mg total) by mouth 2 (two) times a day with meals, Disp: , Rfl:     esomeprazole (NexIUM) 20 mg packet, 20 mg, Disp: , Rfl:     metoprolol tartrate (LOPRESSOR) 50 mg tablet, Take 1 tablet (50 mg total) by mouth 2 (two) times a day, Disp: 180 tablet, Rfl: 1    multivitamin (THERAGRAN) TABS, Take 1 tablet by mouth daily, Disp: , Rfl:     sertraline (ZOLOFT) 50 mg tablet, TAKE ONE TABLET BY MOUTH DAILY, Disp:  90 tablet, Rfl: 1    Current Allergies     Allergies as of 2024 - Reviewed 2024   Allergen Reaction Noted    Codeine Tachycardia     Penicillins Hives 2012    Prednisone Irritability and Hyperactivity 2016    Sulfa antibiotics Hives 2012    Tetracycline  2017            The following portions of the patient's history were reviewed and updated as appropriate: allergies, current medications, past family history, past medical history, past social history, past surgical history and problem list.     Past Medical History:   Diagnosis Date    Abnormal weight gain     Allergic Child    Anxiety     Arthralgia     GERD (gastroesophageal reflux disease)     Panic attack     Stomatitis     last assessed 13; resolved 16       Past Surgical History:   Procedure Laterality Date    CHOLECYSTECTOMY      CHOLECYSTECTOMY LAPAROSCOPIC      WISDOM TOOTH EXTRACTION         Family History   Problem Relation Age of Onset    Diabetes Mother     Breast cancer Mother 74    Asthma Mother     Hypertension Mother     Heart failure Father     Coronary artery disease Father     Diabetes Father             Other Father         pure hypercholesterolemia    No Known Problems Daughter     No Known Problems Daughter     Pancreatic cancer Paternal Grandmother     No Known Problems Maternal Aunt     No Known Problems Maternal Aunt     Breast cancer Paternal Aunt     Cancer Family     Mental illness Neg Hx     Substance Abuse Neg Hx     Colon cancer Neg Hx     Ovarian cancer Neg Hx     Endometrial cancer Neg Hx          Medications have been verified.        Objective   /74   Pulse (!) 50   Temp 98.9 °F (37.2 °C) (Tympanic)   Resp 20   LMP 2022 (Exact Date)   SpO2 99%   Patient's last menstrual period was 2022 (exact date).       Physical Exam     Physical Exam  Vitals and nursing note reviewed.   Constitutional:       General: She is not in acute distress.     Appearance: Normal  appearance. She is well-developed. She is not ill-appearing, toxic-appearing or diaphoretic.   HENT:      Head: Normocephalic and atraumatic.   Eyes:      Extraocular Movements: Extraocular movements intact.      Conjunctiva/sclera: Conjunctivae normal.      Pupils: Pupils are equal, round, and reactive to light.   Pulmonary:      Effort: Pulmonary effort is normal. No respiratory distress.   Skin:     General: Skin is warm and dry.      Findings: Rash (Macular papular rash on bilateral upper and lower extremities and torso with patches and linear streaks.  No deep erythema.  No pustules.) present.   Neurological:      General: No focal deficit present.      Mental Status: She is alert and oriented to person, place, and time.   Psychiatric:         Mood and Affect: Mood normal.         Behavior: Behavior normal.         Thought Content: Thought content normal.         Judgment: Judgment normal.

## 2024-11-03 NOTE — PATIENT INSTRUCTIONS
Avoid excessive heat exposure    Continue topical treatments as directed    Take Zyrtec daily as directed    Follow-up with your primary care physician if symptoms persist

## 2024-11-07 ENCOUNTER — OFFICE VISIT (OUTPATIENT)
Dept: FAMILY MEDICINE CLINIC | Facility: CLINIC | Age: 54
End: 2024-11-07
Payer: COMMERCIAL

## 2024-11-07 ENCOUNTER — OFFICE VISIT (OUTPATIENT)
Dept: SLEEP CENTER | Facility: CLINIC | Age: 54
End: 2024-11-07
Payer: COMMERCIAL

## 2024-11-07 VITALS
HEIGHT: 64 IN | DIASTOLIC BLOOD PRESSURE: 88 MMHG | HEART RATE: 62 BPM | BODY MASS INDEX: 36.7 KG/M2 | WEIGHT: 215 LBS | SYSTOLIC BLOOD PRESSURE: 134 MMHG | RESPIRATION RATE: 14 BRPM | OXYGEN SATURATION: 98 % | TEMPERATURE: 97.1 F

## 2024-11-07 VITALS
HEART RATE: 70 BPM | RESPIRATION RATE: 16 BRPM | SYSTOLIC BLOOD PRESSURE: 130 MMHG | DIASTOLIC BLOOD PRESSURE: 70 MMHG | BODY MASS INDEX: 36.88 KG/M2 | HEIGHT: 64 IN | WEIGHT: 216 LBS | OXYGEN SATURATION: 98 %

## 2024-11-07 DIAGNOSIS — E66.812 CLASS 2 OBESITY DUE TO EXCESS CALORIES WITH BODY MASS INDEX (BMI) OF 37.0 TO 37.9 IN ADULT, UNSPECIFIED WHETHER SERIOUS COMORBIDITY PRESENT: ICD-10-CM

## 2024-11-07 DIAGNOSIS — E66.09 CLASS 2 OBESITY DUE TO EXCESS CALORIES WITH BODY MASS INDEX (BMI) OF 37.0 TO 37.9 IN ADULT, UNSPECIFIED WHETHER SERIOUS COMORBIDITY PRESENT: ICD-10-CM

## 2024-11-07 DIAGNOSIS — I48.91 ATRIAL FIBRILLATION WITH RVR (HCC): ICD-10-CM

## 2024-11-07 DIAGNOSIS — L25.5 RHUS DERMATITIS: Primary | ICD-10-CM

## 2024-11-07 DIAGNOSIS — R06.83 SNORING: Primary | ICD-10-CM

## 2024-11-07 PROCEDURE — 99203 OFFICE O/P NEW LOW 30 MIN: CPT

## 2024-11-07 PROCEDURE — 99213 OFFICE O/P EST LOW 20 MIN: CPT | Performed by: NURSE PRACTITIONER

## 2024-11-07 RX ORDER — BETAMETHASONE DIPROPIONATE 0.5 MG/G
CREAM TOPICAL 2 TIMES DAILY
Qty: 50 G | Refills: 1 | Status: SHIPPED | OUTPATIENT
Start: 2024-11-07

## 2024-11-07 RX ORDER — METOPROLOL TARTRATE 75 MG/1
TABLET, FILM COATED ORAL
COMMUNITY
Start: 2024-09-23

## 2024-11-07 NOTE — PROGRESS NOTES
"Ambulatory Visit  Name: Leeanna Lacy      : 1970      MRN: [id number]  Encounter Provider: ALMA DELIA Dc  Encounter Date: 2024   Encounter department: [facility]    Assessment & Plan  Rhus dermatitis    Orders:    betamethasone, augmented, (DIPROLENE-AF) 0.05 % cream; Apply topically 2 (two) times a day    Pt will stop her Claritin and start either Zyrtec or Allegra nightly. She can continue her oatmeal baths. She is to avoid the heat as well as hot showers. She may apply cool compresses or ice packs to rash. Betamethasone cream prescribed BID x 1-2 weeks. Pt has allergy to Prednisone, so we will avoid this. Pt should note improvement over the next week. If she does not notice improvement, she is encouraged to reach out via [facility]. Patient is encouraged to call our office for any questions/concerns, persistent or worsening symptoms. Patient states they understand and agree with treatment plan.       Pt to f/u PRN.     History of Present Illness     Pt presents today for a rash after encountering poison ivy back on 10/22.  She has tried Hydrocortisone, OTC topicals, oatmeal baths without relief.  She has washed all of her towels and linens w/ hot soapy water.  Denies any new products.  Her only new medication is Metoprolol through Cardiology.  Pt does have a sensitivity to Prednisone in which she has irritability, hyperactivity. She also has hx of Afib.  Pt was seen in Urgent Care on .  She was recommended to start Claritin which she feels has not helped her itching or rash.          History obtained from : patient  Review of Systems  As noted per HPI.  Medical History Reviewed by provider this encounter:  Meds           Objective     /88   Pulse 62   Temp (!) 97.1 °F (36.2 °C)   Resp 14   Ht 5' 3.5\" (1.613 m)   Wt 97.5 kg (215 lb)   LMP 2022 (Exact Date)   SpO2 98%   BMI 37.49 kg/m²     Physical Exam  Vitals reviewed. "   Constitutional:       Appearance: Normal appearance.   Pulmonary:      Effort: Pulmonary effort is normal.   Skin:     Comments: Various random clusters of macular papular rash w/ erythematic base to marla forearms, right hip, left abdomen. No vesicles, heat or discharge noted.   Neurological:      Mental Status: She is alert.   Psychiatric:         Mood and Affect: Mood normal.         Behavior: Behavior normal.         Thought Content: Thought content normal.         Judgment: Judgment normal.

## 2024-11-07 NOTE — PROGRESS NOTES
[facility]  Sleep Medicine New Patient Evaluation    PATIENT NAME: Leaenna Lacy  DATE OF SERVICE: November 7, 2024    CONSULTING PROVIDER:  Drake Castle MD  [address] [place] [address]    ASSESSMENT/PLAN:  Leeanna Lacy is a 54 y.o. female with PMHx of A-fib, GERD, anxiety and obesity who presents for sleep evaluation.    Snoring  Atrial Fibrillation with RVR  - The patient had an episode of A-fib with RVR during recent episode of pneumonia with no recurrence since discharge from the hospital.  She is currently followed with cardiology and was noted to have some snoring.  Suspicion for sleep apnea is lower given lack of symptoms, but given history proceeding with sleep testing is reasonable.  STOP-BANG during today's visit was 3.   - Will obtain HSAT  - Discussed with the patient the possible diagnosis, causes and conditions associated with SDB along with potential treatments.  The patient is amenable to discussing results/treatment plan via phone/MyChart.  - Encouraged healthy lifestyle with adequate sleep (7-9 hours per night), healthy balanced diet and routine exercise.  - Follow-up to be determined based on results of sleep study.  -     Ambulatory Referral to Sleep Medicine  -     Home Study; Future    Class 2 Obesity  - Weight loss is recommended, she has seen a dietician in the past and would like to hold off on referral to weight management as she doesn't want to consider medication at this time.    Thank you for allowing me to participate in the care of your patient.  If there are any questions regarding evaluation please feel free to reach out.   ________________________________________________________________________________________________    HPI: Leeanna Lacy is a 54 y.o. female with PMHx of A-fib, GERD, anxiety and obesity who presents for sleep evaluation.  Sleep-Related History: No prior sleep studies or sleep consults.    The patient was referred by her  cardiologist due to recent diagnosis of afib and history of snoring.  She reports that she has been told by her  that she snores intermittently, but otherwise denies witnessed apneas, gasping/choking in sleep or waking with dry mouth.  She will wake unrefreshed at times, but denies napping or dozing unintentionally.  She also denies symptoms consistent with RLS, insomnia or parasomnias as well as SP, HH and cataplexy.  She does note that the quality of her sleep worsened after menopause, but with the recent initiation of metoprolol she is actually felt her sleep is improved with less nighttime awakenings due to drowsiness caused by the medication.  She does state that when she does have awakenings they tend to happen in the second half of the night and are often for unclear reasons.    ESS:  /24  Greater or equal to 10 is positive for excessive daytime sleepiness  Pertinent Meds: None    Sleep-Wake Schedule:  She is self-described as a morning person.  Bedtime: 2230  Wake Time: 0600  Difficulty Falling Asleep: generally no  Avg Number of Awakenings: 1-2x (usually in the 2nd half of the night)  Cause of Awakenings: odd dreams and unclear reasons  Weekend Sleep Schedule: same bedtime, may sleep until 0700  Naps: denies    Avg TST per 24 hours: 6 hours    Sleep-Related Details:  Preferred Sleep Position: prone and side(s)  SDB Symptoms: snoring and waking unrefreshed  Bruxism: No  Nocturnal GERD: Yes, well controlled with nexium  Nocturnal Palpitations: Yes, occasionally, better since the metoprolol  Sleep-Related Hallucinations: No   Sleep Paralysis: No   Cataplexy: No     She denies having an urge to move the legs in the evening (when resting) nor uncomfortable and/or unpleasant sensations in the legs. She has not been told that she kicks her legs during sleep.     She denies any parasomnia activity.    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    Past Treatments:  None    Prior Sleep Studies:  None    Other Relevant Labs and Studies:  None    Past Medical History:   Diagnosis Date    Abnormal weight gain     Allergic Child    Anxiety     Arthralgia     GERD (gastroesophageal reflux disease)     Panic attack     Stomatitis     last assessed 7/22/13; resolved 5/4/16     Past Surgical History:   Procedure Laterality Date    CHOLECYSTECTOMY      CHOLECYSTECTOMY LAPAROSCOPIC      WISDOM TOOTH EXTRACTION       Patient Active Problem List   Diagnosis    Allergic rhinitis    Esophageal reflux    Generalized anxiety disorder    Vitamin D deficiency    Atrial fibrillation with RVR ([facility])    Hypomagnesemia     Allergies as of 11/07/2024 - Reviewed 11/07/2024   Allergen Reaction Noted    Codeine Tachycardia     Penicillins Hives 04/19/2012    Prednisone Irritability and Hyperactivity 08/31/2016    Sulfa antibiotics Hives 04/19/2012    Tetracycline  01/21/2017     REVIEW OF SYSTEMS:  Review of Systems  10-point system review completed, all of which are negative except as mentioned above.    CURRENT MEDICATIONS:  Current Outpatient Medications   Medication Instructions    betamethasone, augmented, (DIPROLENE-AF) 0.05 % cream Topical, 2 times daily    calcium carbonate (OS-ERICKA) 600 mg, Oral, 2 times daily with meals    esomeprazole (NEXIUM) 20 mg    metoprolol tartrate (LOPRESSOR) 50 mg, Oral, 2 times daily    Metoprolol Tartrate 75 MG TABS     multivitamin (THERAGRAN) TABS 1 tablet, Oral, Daily    sertraline (ZOLOFT) 50 mg, Oral, Daily     SOCIAL HISTORY:  Social History     Tobacco Use    Smoking status: Never    Smokeless tobacco: Never   Vaping Use    Vaping status: Never Used   Substance  "Use Topics    Alcohol use: Yes     Alcohol/week: 1.0 standard drink of alcohol     Types: 1 Glasses of wine per week     Comment: Social    Drug use: No     FAMILY HISTORY:  Family History   Problem Relation Age of Onset    Diabetes Mother     Breast cancer Mother 74    Asthma Mother     Hypertension Mother     Heart failure Father     Coronary artery disease Father     Diabetes Father             Other Father         pure hypercholesterolemia    No Known Problems Daughter     No Known Problems Daughter     Pancreatic cancer Paternal Grandmother     No Known Problems Maternal Aunt     No Known Problems Maternal Aunt     Breast cancer Paternal Aunt     Cancer Family     Mental illness Neg Hx     Substance Abuse Neg Hx     Colon cancer Neg Hx     Ovarian cancer Neg Hx     Endometrial cancer Neg Hx      Family History of Sleep Disorders: mother insomnia    PHYSICAL EXAMINATION:  Vital Signs:  /70   Pulse 70   Resp 16   Ht 5' 3.5\" (1.613 m)   Wt 98 kg (216 lb)   LMP 2022 (Exact Date)   SpO2 98%   BMI 37.66 kg/m²   Body mass index is 37.66 kg/m².    Constitutional: NAD, well appearing, obese   Mental Status: AAOx3  Neck Circumference:   inches  Skin: Warm, dry, no rashes noted   Eyes: PERRL, normal conjunctiva  ENT: Nasal congestion absent, nasal valve incompetence absent.  Posterior Airspace:   Dye Tongue Position: 3  Retrognathia: absent  Overbite: absent  High Arched Palate: absent  Tongue Scalloping/Ridging: absent  Tonsils: 1+  Uvula: normal  Chest: RRR, +S1/S2, CTA B/L, no W/R/R, no M/R/G   Extremities: No digital clubbing or pedal edema      Victorina Golsdtein MD  Pulmonary-Critical Care and Sleep Medicine  24    Portions of the record may have been created with voice recognition software. Occasional wrong word or \"sound a like\" substitutions may have occurred due to the inherent limitations of voice recognition software. Please read the chart carefully and recognize, using " context, where substitutions have occurred.

## 2024-12-12 ENCOUNTER — OFFICE VISIT (OUTPATIENT)
Dept: CARDIOLOGY CLINIC | Facility: CLINIC | Age: 54
End: 2024-12-12
Payer: COMMERCIAL

## 2024-12-12 VITALS
DIASTOLIC BLOOD PRESSURE: 98 MMHG | WEIGHT: 218 LBS | SYSTOLIC BLOOD PRESSURE: 166 MMHG | BODY MASS INDEX: 38.01 KG/M2 | OXYGEN SATURATION: 99 % | HEART RATE: 70 BPM

## 2024-12-12 DIAGNOSIS — I49.3 SYMPTOMATIC PVCS: ICD-10-CM

## 2024-12-12 DIAGNOSIS — I48.0 PAROXYSMAL ATRIAL FIBRILLATION (HCC): Primary | ICD-10-CM

## 2024-12-12 DIAGNOSIS — R06.83 SNORING: ICD-10-CM

## 2024-12-12 PROCEDURE — 99214 OFFICE O/P EST MOD 30 MIN: CPT | Performed by: INTERNAL MEDICINE

## 2024-12-12 NOTE — PROGRESS NOTES
[facility]    Name:Leeanna Lacy   DOS: 12/12/2024     Chief Complaint:   Chief Complaint   Patient presents with    Follow-up     8 week f/u       HISTORY OF PRESENT ILLNESS:    HPI:  Leeanna Lacy is a 54 y.o. female. She  has a past medical history of Abnormal weight gain, Allergic (Child), Anxiety, Arthralgia, GERD (gastroesophageal reflux disease), Panic attack, and Stomatitis.    She presents for follow up evaluation. She last saw me in the office on 8/26/24. Per my prior OV notes:  The patient is known to me from her recent hospitalization 8/3/2024 when she had presented to the hospital for evaluation of severe palpitations that awoke her from sleep. She was newly diagnosed with atrial fibrillation at that time, with rapid ventricular rates.  She spontaneously converted to sinus rhythm in the hospital, and was discharged on an oral calcium channel blocker for rate control strategy.  Oral anticoagulation was not initiated in the hospital given [id number] score of 1, although we did discuss blood pressure monitoring to ensure that she is not truly hypertensive as this would be an additional point.     At that office visit, I had referred her for a 14 day Ziopatch to assess atrial fibrillation burden.  The study demonstrated no atrial fibrillation, but did demonstrate a single 4 beat episode of nonsustained ventricular tachycardia.  In that context, I switched her from diltiazem to Lopressor and recommended repeat cardiac patch rhythm monitoring.  Notably, the patient has normal LV systolic function with excellent functional capacity, and therefore cardiovascular stress testing was deferred.  Repeat cardiac patch rhythm monitor was successfully completed, demonstrating no atrial fibrillation and no recurrent NSVT.    Today, the patient reports no new cardiopulmonary complaints.  Continues to experience episodic individual palpitations on rare occasion.  Has had no recurrent sustained  palpitations.  Overall frequency of her palpitations is substantially reduced compared to prior.  She otherwise denies chest pain, shortness of breath, diaphoresis, dizziness, orthopnea, edema, syncope.    Since she last saw me in office, she has establish care with sleep medicine, Dr. Goldstein. She is scheduled for a home sleep study.     Hypertensive in office today, however her home blood pressure log demonstrates excellent blood pressure control averaging 120/70-80s.     ROS    ROS: Pertinent positives and negatives as described in History of Present Illness. Remainder of a 14 point review of systems was negative.     Allergies   Allergen Reactions    Codeine Tachycardia    Penicillins Hives    Prednisone Irritability and Hyperactivity    Sulfa Antibiotics Hives    Tetracycline         Current Outpatient Medications on File Prior to Visit   Medication Sig Dispense Refill    betamethasone, augmented, (DIPROLENE-AF) 0.05 % cream Apply topically 2 (two) times a day 50 g 1    calcium carbonate (OS-ERICKA) 600 MG tablet Take 1 tablet (600 mg total) by mouth 2 (two) times a day with meals      esomeprazole (NexIUM) 20 mg packet 20 mg      metoprolol tartrate (LOPRESSOR) 50 mg tablet Take 1 tablet (50 mg total) by mouth 2 (two) times a day 180 tablet 1    Metoprolol Tartrate 75 MG TABS       multivitamin (THERAGRAN) TABS Take 1 tablet by mouth daily      sertraline (ZOLOFT) 50 mg tablet TAKE ONE TABLET BY MOUTH DAILY 90 tablet 1     No current facility-administered medications on file prior to visit.       Past Medical History:   Diagnosis Date    Abnormal weight gain     Allergic Child    Anxiety     Arthralgia     GERD (gastroesophageal reflux disease)     Panic attack     Stomatitis     last assessed 7/22/13; resolved 5/4/16       Past Surgical History:   Procedure Laterality Date    CHOLECYSTECTOMY      CHOLECYSTECTOMY LAPAROSCOPIC      WISDOM TOOTH EXTRACTION         Family History   Problem Relation Age of Onset     Diabetes Mother     Breast cancer Mother 74    Asthma Mother     Hypertension Mother     Heart failure Father     Coronary artery disease Father     Diabetes Father             Other Father         pure hypercholesterolemia    No Known Problems Daughter     No Known Problems Daughter     Pancreatic cancer Paternal Grandmother     No Known Problems Maternal Aunt     No Known Problems Maternal Aunt     Breast cancer Paternal Aunt     Cancer Family     Mental illness Neg Hx     Substance Abuse Neg Hx     Colon cancer Neg Hx     Ovarian cancer Neg Hx     Endometrial cancer Neg Hx        Social History     Socioeconomic History    Marital status: /Civil Union     Spouse name: Not on file    Number of children: Not on file    Years of education: Not on file    Highest education level: Not on file   Occupational History    Not on file   Tobacco Use    Smoking status: Never    Smokeless tobacco: Never   Vaping Use    Vaping status: Never Used   Substance and Sexual Activity    Alcohol use: Yes     Alcohol/week: 1.0 standard drink of alcohol     Types: 1 Glasses of wine per week     Comment: Social    Drug use: No    Sexual activity: Yes     Partners: Male     Birth control/protection: Male Sterilization   Other Topics Concern    Not on file   Social History Narrative    Caffeine use - Nominal     Uses safety equipment - smoke detectors     Uses seat belts      Social Drivers of Health     Financial Resource Strain: Not on file   Food Insecurity: Not on file   Transportation Needs: Not on file   Physical Activity: Not on file   Stress: Not on file   Social Connections: Not on file   Intimate Partner Violence: Not on file   Housing Stability: Not on file       OBJECTIVE:    LMP 2022 (Exact Date)      BP Readings from Last 3 Encounters:   24 130/70   24 134/88   24 116/74       Wt Readings from Last 3 Encounters:   24 98 kg (216 lb)   24 97.5 kg (215 lb)   10/01/24 96.8 kg  (213 lb 6.4 oz)         Physical Exam  Vitals reviewed.   Constitutional:       General: She is not in acute distress.     Appearance: Normal appearance. She is not diaphoretic.   HENT:      Head: Normocephalic and atraumatic.   Eyes:      Conjunctiva/sclera: Conjunctivae normal.   Neck:      Vascular: No JVD.   Cardiovascular:      Rate and Rhythm: Normal rate and regular rhythm.      Pulses: Normal pulses.      Heart sounds: Normal heart sounds. No murmur heard.     No friction rub. No gallop.   Pulmonary:      Effort: Pulmonary effort is normal.      Breath sounds: Normal breath sounds. No wheezing, rhonchi or rales.   Abdominal:      General: Abdomen is flat. Bowel sounds are normal.   Musculoskeletal:      Right lower leg: No edema.      Left lower leg: No edema.   Skin:     General: Skin is warm and dry.   Neurological:      General: No focal deficit present.      Mental Status: She is alert and oriented to person, place, and time.   Psychiatric:         Mood and Affect: Mood normal.         Behavior: Behavior normal.                                                         LABS:  Lab Results   Component Value Date    GLUCOSE 99 07/31/2015    BUN 8 08/04/2024    CREATININE 0.52 (L) 08/04/2024    CALCIUM 8.6 08/04/2024     07/31/2015    K 3.5 08/04/2024    CO2 25 08/04/2024     08/04/2024    ALKPHOS 52 08/03/2024    BILITOT 0.31 07/31/2015    PROT 7.7 07/31/2015    AST 18 08/03/2024    ALT 16 08/03/2024    ANIONGAP 6 07/31/2015        Lab Results   Component Value Date    WBC 8.58 08/04/2024    HGB 13.0 08/04/2024    HCT 38.7 08/04/2024    MCV 89 08/04/2024     08/04/2024       Lab Results   Component Value Date    CHOL 171 07/31/2015    HDL 46 (L) 09/06/2024    LDLCALC 122 (H) 09/06/2024    TRIG 174 (H) 09/06/2024       Lab Results   Component Value Date    HGBA1C 6.3 (H) 09/06/2024           ASSESSMENT/PLAN:  Diagnoses and all orders for this visit:    Paroxysmal atrial fibrillation (HCC)  -  "Stable without recurrence. [id number]-[id number] score is 1. We discussed the role for anticoagulation in this patient population, noting no clear guidelines to support their use at this juncture. We will re-address this with each office visit.     Symptomatic PVCs  - Resolved with Lopressor.     Snoring  - Following with sleep medicine, sleep study pending.     Plan:  Continue Lopressor 50mg BID  Continue home BP monitoring.   1 year follow up      Drake Castle MD [facility]      Portions of the record may have been created with voice recognition software. Occasional wrong word or \"sound alike\" substitutions may have occurred due to the inherent limitations of voice recognition software. Please review the chart carefully and recognize, using context, where substitutions/typographical errors may have occurred.     "

## 2024-12-12 NOTE — PATIENT INSTRUCTIONS
You were seen today in the Cardiology office for follow up evaluation.     Please continue your current cardiac medications as prescribed.    Thank you for choosing [facility].    Please call our office or use "[employer] with any questions.

## 2024-12-13 ENCOUNTER — HOSPITAL ENCOUNTER (OUTPATIENT)
Dept: SLEEP CENTER | Facility: HOSPITAL | Age: 54
Discharge: HOME | End: 2024-12-13
Payer: COMMERCIAL

## 2024-12-13 DIAGNOSIS — R06.83 SNORING: ICD-10-CM

## 2024-12-13 DIAGNOSIS — I48.91 ATRIAL FIBRILLATION WITH RVR (HCC): ICD-10-CM

## 2024-12-13 PROCEDURE — G0399 HOME SLEEP TEST/TYPE 3 PORTA: HCPCS

## 2024-12-13 NOTE — PROGRESS NOTES
Home Sleep Study Documentation    HOME STUDY DEVICE: Noxturnal no                                           Sugey G3 yes device # 18      Pre-Sleep Home Study:    Set-up and instructions performed by: Vane    Technician performed demonstration for Patient: yes    Return demonstration performed by Patient: yes    Written instructions provided to Patient: yes    Patient signed consent form: yes        Post-Sleep Home Study:    Additional comments by Patient: None    Home Sleep Study Failed:no:    Failure reason: N/A    Reported or Detected: N/A    Scored by: KRUPA Terrell

## 2024-12-18 PROBLEM — G47.33 OSA (OBSTRUCTIVE SLEEP APNEA): Status: ACTIVE | Noted: 2024-12-18

## 2024-12-30 ENCOUNTER — TELEMEDICINE (OUTPATIENT)
Dept: OTHER | Facility: HOSPITAL | Age: 54
End: 2024-12-30
Payer: COMMERCIAL

## 2024-12-30 ENCOUNTER — RESULTS FOLLOW-UP (OUTPATIENT)
Dept: SLEEP CENTER | Facility: CLINIC | Age: 54
End: 2024-12-30

## 2024-12-30 DIAGNOSIS — R21 RASH: Primary | ICD-10-CM

## 2024-12-30 PROBLEM — R06.83 SNORING: Status: ACTIVE | Noted: 2024-12-30

## 2024-12-30 PROCEDURE — 99213 OFFICE O/P EST LOW 20 MIN: CPT | Performed by: NURSE PRACTITIONER

## 2024-12-30 RX ORDER — TRIAMCINOLONE ACETONIDE 1 MG/G
CREAM TOPICAL 2 TIMES DAILY
Qty: 15 G | Refills: 0 | Status: SHIPPED | OUTPATIENT
Start: 2024-12-30

## 2024-12-31 NOTE — PROGRESS NOTES
Virtual Regular Visit  Name: Leeanna Lacy      : 1970      MRN: [id number]  Encounter Provider: ALMA DELIA Hanley  Encounter Date: 2024   Encounter department: VIRTUAL CARE       Verification of patient location:  Patient is located at Home in the following state in which I hold an active license [place] :  Assessment & Plan  Rash    Orders:    triamcinolone (KENALOG) 0.1 % cream; Apply topically 2 (two) times a day        Encounter provider ALMA DELIA Hanley    The patient was identified by name and date of birth. Leeanna Lacy was informed that this is a telemedicine visit and that the visit is being conducted through the Epic Embedded platform. She agrees to proceed..  My office door was closed. No one else was in the room.  She acknowledged consent and understanding of privacy and security of the video platform. The patient has agreed to participate and understands they can discontinue the visit at any time.    Patient is aware this is a billable service.     History was obtained from: History obtained from: patient  History of Present Illness     Rash  This is a new problem. The current episode started yesterday. The problem is unchanged. The affected locations include the right buttock and left upper leg. The problem is mild. The rash is characterized by redness, itchiness and burning. She was exposed to nothing. Associated symptoms include itching. Pertinent negatives include no cough, facial edema, fatigue, fever, joint pain, shortness of breath or vomiting. Past treatments include topical steroids (hydrocortisone cream). The treatment provided moderate relief. There were no sick contacts.     Review of Systems   Constitutional:  Negative for chills, diaphoresis, fatigue and fever.   HENT: Negative.     Respiratory:  Negative for cough, chest tightness, shortness of breath, wheezing and stridor.    Cardiovascular: Negative.    Gastrointestinal:  Negative for vomiting.    Musculoskeletal:  Negative for arthralgias, back pain, joint pain, joint swelling and myalgias.   Skin:  Positive for itching and rash.   Neurological: Negative.        Objective   LMP 05/31/2022 (Exact Date)     Physical Exam  Constitutional:       General: She is not in acute distress.     Appearance: She is well-developed. She is not diaphoretic.   Cardiovascular:      Comments: Unable to assess in this setting  Pulmonary:      Effort: Pulmonary effort is normal.      Comments: Patient able to converse in full sentences, easy non-labored respirations noted. No dyspnea observed.      Lymphadenopathy:      Cervical: No cervical adenopathy.   Skin:     Coloration: Skin is not pale.      Findings: Rash present. Rash is macular (small area to left upper thigh) and papular. Rash is not vesicular.   Neurological:      Mental Status: She is alert and oriented to person, place, and time.         Visit Time  Total Visit Duration: 15 minutes not including the time spent for establishing the audio/video connection.

## 2024-12-31 NOTE — PATIENT INSTRUCTIONS
apply cream to affected areas.  Can take Benadryl as needed for itching, it can make you drowsy.  Oat meal soaks can be helpful.  If you develop any increased redness, rash is spreading, facial swelling, shortness of breath, difficulty breathing, fever, any new or concerning symptoms please return or proceed ER.  Advised follow-up with PCP in 3-5 days

## 2025-02-18 ENCOUNTER — OFFICE VISIT (OUTPATIENT)
Dept: FAMILY MEDICINE CLINIC | Facility: CLINIC | Age: 55
End: 2025-02-18
Payer: COMMERCIAL

## 2025-02-18 VITALS
HEIGHT: 64 IN | OXYGEN SATURATION: 98 % | TEMPERATURE: 97.3 F | SYSTOLIC BLOOD PRESSURE: 126 MMHG | RESPIRATION RATE: 16 BRPM | WEIGHT: 218 LBS | HEART RATE: 65 BPM | DIASTOLIC BLOOD PRESSURE: 84 MMHG | BODY MASS INDEX: 37.22 KG/M2

## 2025-02-18 DIAGNOSIS — K21.00 GASTROESOPHAGEAL REFLUX DISEASE WITH ESOPHAGITIS WITHOUT HEMORRHAGE: ICD-10-CM

## 2025-02-18 DIAGNOSIS — K76.0 FATTY LIVER: ICD-10-CM

## 2025-02-18 DIAGNOSIS — E78.2 MIXED HYPERLIPIDEMIA: ICD-10-CM

## 2025-02-18 DIAGNOSIS — I48.91 ATRIAL FIBRILLATION WITH RVR (HCC): ICD-10-CM

## 2025-02-18 DIAGNOSIS — E66.01 SEVERE OBESITY (BMI 35.0-39.9) WITH COMORBIDITY (HCC): Primary | ICD-10-CM

## 2025-02-18 DIAGNOSIS — R73.03 PRE-DIABETES: ICD-10-CM

## 2025-02-18 PROBLEM — E83.42 HYPOMAGNESEMIA: Status: RESOLVED | Noted: 2024-08-03 | Resolved: 2025-02-18

## 2025-02-18 PROBLEM — G47.33 OSA (OBSTRUCTIVE SLEEP APNEA): Status: RESOLVED | Noted: 2024-12-18 | Resolved: 2025-02-18

## 2025-02-18 PROCEDURE — 99214 OFFICE O/P EST MOD 30 MIN: CPT | Performed by: PHYSICIAN ASSISTANT

## 2025-02-18 RX ORDER — TIRZEPATIDE 2.5 MG/.5ML
2.5 INJECTION, SOLUTION SUBCUTANEOUS WEEKLY
Qty: 2 ML | Refills: 0 | Status: SHIPPED | OUTPATIENT
Start: 2025-02-18 | End: 2025-03-18

## 2025-02-18 NOTE — PROGRESS NOTES
Name: Leeanna Lacy      : 1970      MRN: [id number]  Encounter Provider: Devora Bundy PA-C  Encounter Date: 2025   Encounter department: [facility]  :  Assessment & Plan  Severe obesity (BMI 35.0-39.9) with comorbidity (HCC)  Prior Authorization Clinical Questions for Weight Management Pharmacotherapy    1. Does the patient have a contrainidcation to medication prescribed for weight management?: No  2. Does the patient have a diagnosis of obesity, confirmed by a BMI greater than or equal to 30 kg/m^2?: Yes  3. Does the patient have a BMI of greater than or equal to 27 kg/m^2 with at least one weight-related comorbidity/risk factor/complication (e.g. diabetes, dyslipidemia, coronary artery disease)?: Yes  4. Weight-related co-morbidities/risk factors: prediabetes, dyslipidemia, hypertension, cardiovascular disease, metabolic dysfunction-associated steatotic liver disease (MASLD), GERD  5. WEGOVY CVA Indication: Does patient have established documented cardiovascular disease (history of a prior heart attack (myocardial infarction), stroke, or symptomatic peripheral arterial disease (PAD)?: No  6. ZEPBOUND ALMITA Indication: Does patient have documented ALMITA diagnosed via sleep study (insurance will require copy of sleep study results for approval)?: No  7. Has the patient been on a weight loss regimen of low-calorie diet, increased physical activity, and lifestyle modifications for a minimum of 6 months?: Yes  8. Has the patient completed a comprehensive weight loss program (ie, Weight Watchers, Noom, Bariatrics, other niya on phone)? If so, what?: Yes   -- Q8 Further Explanation: [facility], [facility]   9. Does the patient have a history of type 2 diabetes?: No  10. Has the member tried and failed other weight loss medication within the past 12 months?: No  11. Will the member use requested medication in combination with another GLP agonist or  weight loss drug?: No  12. Is the medication a controlled substance?: No  Additional comments: Patient with new onset a fib, recommended by cardiology to focus on weight loss with use of GLP     Baseline weight (in pounds): 218 lbs         Orders:    tirzepatide (Zepbound) 2.5 mg/0.5 mL auto-injector; Inject 0.5 mL (2.5 mg total) under the skin once a week for 28 days    Trial zepbound at the recommendation of cardiology, patient denies history of thyroid cancer or pancreatitis, discussed with patient, will start zepbound 2.5 mg once weekly for a month then increase to 5 mg once weekly for 2 months. Follow up after  Atrial fibrillation with RVR (HCC)    On metoprolol bid, NSR       Gastroesophageal reflux disease with esophagitis without hemorrhage    On nexium 20 mg, would benefit from weight loss       Mixed hyperlipidemia    Controlled with diet       Pre-diabetes    Controlled with diet       Fatty liver    Monitoring with LFT q 6 months         F/u 3 months or sooner if needed     History of Present Illness   Patient here at the recommendation of cardiology and sleep medicine to go on zepbound to lose weight. Patient with paroxsymal a fib after pneumonia. On metoprolol now, in NSR. Cannot lose weight. Followed with [facility], weight watchers, all without success.    A1C 6.3%, hyperlipidemia, GERD on nexium, fatty liver.    Had sleep study negative for sleep apnea.      Review of Systems   Constitutional:  Negative for chills and fever.   HENT:  Negative for ear pain and sore throat.    Eyes:  Negative for pain and visual disturbance.   Respiratory:  Negative for cough and shortness of breath.    Cardiovascular:  Negative for chest pain and palpitations.   Gastrointestinal:  Negative for abdominal pain and vomiting.   Genitourinary:  Negative for dysuria and hematuria.   Musculoskeletal:  Negative for arthralgias and back pain.   Skin:  Negative for color change and rash.   Neurological:  Negative  "for seizures and syncope.   All other systems reviewed and are negative.      Objective   /84   Pulse 65   Temp (!) 97.3 °F (36.3 °C) (Temporal)   Resp 16   Ht 5' 3.5\" (1.613 m)   Wt 98.9 kg (218 lb)   LMP 05/31/2022 (Exact Date)   SpO2 98%   BMI 38.01 kg/m²      Physical Exam  Constitutional:       Appearance: Normal appearance. She is well-developed and normal weight.   HENT:      Head: Normocephalic and atraumatic.   Cardiovascular:      Rate and Rhythm: Normal rate and regular rhythm.      Pulses: Normal pulses.      Heart sounds: Normal heart sounds.   Pulmonary:      Effort: Pulmonary effort is normal.      Breath sounds: Normal breath sounds.   Musculoskeletal:         General: Normal range of motion.      Cervical back: Normal range of motion and neck supple.      Right lower leg: No edema.      Left lower leg: No edema.   Skin:     General: Skin is warm.   Neurological:      General: No focal deficit present.      Mental Status: She is alert and oriented to person, place, and time.   Psychiatric:         Mood and Affect: Mood normal.         Behavior: Behavior normal.         Thought Content: Thought content normal.         Judgment: Judgment normal.         "

## 2025-02-19 ENCOUNTER — TELEPHONE (OUTPATIENT)
Age: 55
End: 2025-02-19

## 2025-02-19 NOTE — TELEPHONE ENCOUNTER
[place] for Zepbound 2.5mg APPROVED     Date(s) approved February 19, 2025 to February 19, 2026     Case #: [id number]     Patient advised by          [][employer] Message  [x]Phone call   [x]LMOM  []L/M to call office as no active Communication consent on file  []Unable to leave detailed message as VM not approved on Communication consent       Pharmacy advised by    [x]Fax  []Phone call     Approval letter scanned into Media No -Awaiting letter

## 2025-02-19 NOTE — TELEPHONE ENCOUNTER
PA for Zepbound 2.5mg SUBMITTED to [employer]    via    []CMM-KEY:   [x]Surescripts-Case ID #: [id number]   []Availity-Auth ID #  []Faxed to plan   []Other website   []Phone call Case ID #     []PA sent as URGENT    All office notes, labs and other pertaining documents and studies sent. Clinical questions answered. Awaiting determination from insurance company.     Turnaround time for your insurance to make a decision on your Prior Authorization can take 7-21 business days.

## 2025-03-01 DIAGNOSIS — I48.0 PAROXYSMAL ATRIAL FIBRILLATION (HCC): ICD-10-CM

## 2025-03-01 DIAGNOSIS — I49.3 SYMPTOMATIC PVCS: ICD-10-CM

## 2025-03-03 RX ORDER — METOPROLOL TARTRATE 50 MG
TABLET ORAL
Qty: 180 TABLET | Refills: 1 | Status: SHIPPED | OUTPATIENT
Start: 2025-03-03

## 2025-03-14 DIAGNOSIS — E66.01 SEVERE OBESITY (BMI 35.0-39.9) WITH COMORBIDITY (HCC): Primary | ICD-10-CM

## 2025-03-14 RX ORDER — TIRZEPATIDE 5 MG/.5ML
5 INJECTION, SOLUTION SUBCUTANEOUS WEEKLY
Qty: 2 ML | Refills: 0 | Status: SHIPPED | OUTPATIENT
Start: 2025-03-14

## 2025-03-28 ENCOUNTER — PATIENT MESSAGE (OUTPATIENT)
Dept: CARDIOLOGY CLINIC | Facility: CLINIC | Age: 55
End: 2025-03-28

## 2025-03-28 DIAGNOSIS — I48.0 PAROXYSMAL ATRIAL FIBRILLATION (HCC): ICD-10-CM

## 2025-03-28 DIAGNOSIS — I49.3 SYMPTOMATIC PVCS: ICD-10-CM

## 2025-04-05 DIAGNOSIS — I48.0 PAROXYSMAL ATRIAL FIBRILLATION (HCC): ICD-10-CM

## 2025-04-05 DIAGNOSIS — I49.3 SYMPTOMATIC PVCS: ICD-10-CM

## 2025-04-07 RX ORDER — METOPROLOL TARTRATE 50 MG
50 TABLET ORAL 2 TIMES DAILY
Qty: 180 TABLET | Refills: 4 | OUTPATIENT
Start: 2025-04-07

## 2025-04-14 ENCOUNTER — PATIENT MESSAGE (OUTPATIENT)
Dept: FAMILY MEDICINE CLINIC | Facility: CLINIC | Age: 55
End: 2025-04-14

## 2025-04-14 DIAGNOSIS — E66.01 SEVERE OBESITY (BMI 35.0-39.9) WITH COMORBIDITY (HCC): ICD-10-CM

## 2025-04-14 RX ORDER — TIRZEPATIDE 5 MG/.5ML
5 INJECTION, SOLUTION SUBCUTANEOUS WEEKLY
Qty: 2 ML | Refills: 0 | Status: SHIPPED | OUTPATIENT
Start: 2025-04-14

## 2025-04-30 ENCOUNTER — HOSPITAL ENCOUNTER (OUTPATIENT)
Dept: RADIOLOGY | Facility: MEDICAL CENTER | Age: 55
Discharge: HOME | End: 2025-04-30
Payer: COMMERCIAL

## 2025-04-30 VITALS — WEIGHT: 218 LBS | BODY MASS INDEX: 37.22 KG/M2 | HEIGHT: 64 IN

## 2025-04-30 DIAGNOSIS — Z12.31 ENCOUNTER FOR SCREENING MAMMOGRAM FOR MALIGNANT NEOPLASM OF BREAST: ICD-10-CM

## 2025-04-30 PROCEDURE — 77067 SCR MAMMO BI INCL CAD: CPT

## 2025-04-30 PROCEDURE — 77063 BREAST TOMOSYNTHESIS BI: CPT

## 2025-05-05 ENCOUNTER — RESULTS FOLLOW-UP (OUTPATIENT)
Age: 55
End: 2025-05-05

## 2025-05-06 DIAGNOSIS — I48.0 PAROXYSMAL ATRIAL FIBRILLATION (HCC): ICD-10-CM

## 2025-05-06 DIAGNOSIS — I49.3 SYMPTOMATIC PVCS: ICD-10-CM

## 2025-05-06 RX ORDER — METOPROLOL TARTRATE 50 MG
50 TABLET ORAL 2 TIMES DAILY
Qty: 180 TABLET | Refills: 1 | Status: SHIPPED | OUTPATIENT
Start: 2025-05-06

## 2025-05-14 ENCOUNTER — PATIENT MESSAGE (OUTPATIENT)
Dept: FAMILY MEDICINE CLINIC | Facility: CLINIC | Age: 55
End: 2025-05-14

## 2025-05-14 DIAGNOSIS — E66.01 SEVERE OBESITY (BMI 35.0-39.9) WITH COMORBIDITY (HCC): ICD-10-CM

## 2025-05-14 RX ORDER — TIRZEPATIDE 5 MG/.5ML
5 INJECTION, SOLUTION SUBCUTANEOUS WEEKLY
Qty: 2 ML | Refills: 0 | Status: SHIPPED | OUTPATIENT
Start: 2025-05-14

## 2025-06-18 ENCOUNTER — NURSE TRIAGE (OUTPATIENT)
Age: 55
End: 2025-06-18

## 2025-06-23 ENCOUNTER — OFFICE VISIT (OUTPATIENT)
Dept: FAMILY MEDICINE CLINIC | Facility: CLINIC | Age: 55
End: 2025-06-23
Payer: COMMERCIAL

## 2025-06-23 VITALS
TEMPERATURE: 98.6 F | RESPIRATION RATE: 16 BRPM | HEIGHT: 64 IN | OXYGEN SATURATION: 98 % | WEIGHT: 195 LBS | DIASTOLIC BLOOD PRESSURE: 80 MMHG | HEART RATE: 78 BPM | SYSTOLIC BLOOD PRESSURE: 142 MMHG | BODY MASS INDEX: 33.29 KG/M2

## 2025-06-23 DIAGNOSIS — R21 RASH: Primary | ICD-10-CM

## 2025-06-23 PROCEDURE — 99213 OFFICE O/P EST LOW 20 MIN: CPT | Performed by: NURSE PRACTITIONER

## 2025-06-23 RX ORDER — CLOTRIMAZOLE AND BETAMETHASONE DIPROPIONATE 10; .64 MG/G; MG/G
CREAM TOPICAL 2 TIMES DAILY
Qty: 45 G | Refills: 2 | Status: SHIPPED | OUTPATIENT
Start: 2025-06-23

## 2025-06-23 NOTE — PROGRESS NOTES
"Name: Leeanna Lacy      : 1970      MRN: [id number]  Encounter Provider: ALMA DELIA Dc  Encounter Date: 2025   Encounter department: [facility] PRACT[facility]  :  Assessment & Plan  Rash    Orders:    clotrimazole-betamethasone (LOTRISONE) 1-0.05 % cream; Apply topically 2 (two) times a day    Pt unable to take Prednisone d/t allergy. Plan to start Lotrisone to see if this helps calm irritation and itching. Antifungal component in the event there is any fungal growth from patient's hx of being in wet swimsuit most of the time on vacation. Pt should note improvement w/in 1 week. She will apply cream to rash until rash is gone, then for 1 week thereafter. She will also take Claritin at bedtime. Patient is encouraged to call our office for any questions/concerns, persistent or worsening symptoms. Patient states they understand and agree with treatment plan.         Pt to f/u PRN.       History of Present Illness   Pt presents today for rash to her right lower abdomen/hip that started 8 days ago.  She notes she was previously on vacation and in a wet swimsuit most of her time on vacation.  She has been taking Hydrocortisone, but has not noted relief.  Pt also tried drying it out w/ alcohol without relief.  Pt denies any other locations of rash.      Review of Systems  As noted per HPI.  Objective   /80   Pulse 78   Temp 98.6 °F (37 °C) (Temporal)   Resp 16   Ht 5' 3.5\" (1.613 m)   Wt 88.5 kg (195 lb)   LMP 2022 (Exact Date)   SpO2 98%   BMI 34.00 kg/m²      Physical Exam  Constitutional:       Appearance: Normal appearance.     Skin:     Findings: Rash (large erythematic papular rash w/ erythematic base w/ defined borders to right lower abdomen hip.) present.     Neurological:      Mental Status: She is alert and oriented to person, place, and time. Mental status is at baseline.     Psychiatric:         Mood and Affect: Mood normal.         " Behavior: Behavior normal.         Thought Content: Thought content normal.         Judgment: Judgment normal.

## 2025-06-24 ENCOUNTER — ANNUAL EXAM (OUTPATIENT)
Age: 55
End: 2025-06-24
Payer: COMMERCIAL

## 2025-06-24 VITALS
BODY MASS INDEX: 33.6 KG/M2 | SYSTOLIC BLOOD PRESSURE: 128 MMHG | HEIGHT: 64 IN | WEIGHT: 196.8 LBS | DIASTOLIC BLOOD PRESSURE: 100 MMHG

## 2025-06-24 DIAGNOSIS — Z12.4 SCREENING FOR CERVICAL CANCER: ICD-10-CM

## 2025-06-24 DIAGNOSIS — E66.01 SEVERE OBESITY (BMI 35.0-39.9) WITH COMORBIDITY (HCC): ICD-10-CM

## 2025-06-24 DIAGNOSIS — Z12.31 ENCOUNTER FOR SCREENING MAMMOGRAM FOR MALIGNANT NEOPLASM OF BREAST: ICD-10-CM

## 2025-06-24 DIAGNOSIS — Z01.419 ENCOUNTER FOR ANNUAL ROUTINE GYNECOLOGICAL EXAMINATION: Primary | ICD-10-CM

## 2025-06-24 DIAGNOSIS — Z11.51 SCREENING FOR HUMAN PAPILLOMAVIRUS (HPV): ICD-10-CM

## 2025-06-24 PROCEDURE — G0476 HPV COMBO ASSAY CA SCREEN: HCPCS | Performed by: OBSTETRICS & GYNECOLOGY

## 2025-06-24 PROCEDURE — G0145 SCR C/V CYTO,THINLAYER,RESCR: HCPCS | Performed by: PATHOLOGY

## 2025-06-24 PROCEDURE — S0612 ANNUAL GYNECOLOGICAL EXAMINA: HCPCS | Performed by: OBSTETRICS & GYNECOLOGY

## 2025-06-24 RX ORDER — TIRZEPATIDE 5 MG/.5ML
5 INJECTION, SOLUTION SUBCUTANEOUS WEEKLY
Qty: 2 ML | Refills: 0 | Status: SHIPPED | OUTPATIENT
Start: 2025-06-24

## 2025-06-24 RX ORDER — TIRZEPATIDE 5 MG/.5ML
5 INJECTION, SOLUTION SUBCUTANEOUS WEEKLY
Qty: 2 ML | Refills: 0 | Status: CANCELLED | OUTPATIENT
Start: 2025-06-24

## 2025-06-24 NOTE — PROGRESS NOTES
"Annual Wellness Visit  Name: Leeanna Lacy      : 1970      MRN: [id number]  Encounter Provider: Laquita Shah MD  Encounter Date: 2025   Encounter department: [facility] [place]    55 y.o.  yo presents today for her annual exam.:  Assessment & Plan  Encounter for annual routine gynecological examination         Screening for cervical cancer    Orders:    Liquid-based pap, screening    Screening for human papillomavirus (HPV)    Orders:    Liquid-based pap, screening    Encounter for screening mammogram for malignant neoplasm of breast    Orders:    Mammo screening bilateral w 3d and cad; Future             History of Present Illness     Leeanna Lacy is a 55 y.o. female who presents for annual well woman exam.    GYN:  No vaginal discharge, labial erythema or lesions, dyspareunia.  No VB  Contraception: .  Patient is sexually active with one partner.      OB:   female      :  Denies dysuria, urinary frequency or urgency.  No hematuria, flank pain, incontinence.  No constipation, diarrhea    Breast:  Denies breast mass, skin changes, dimpling, reddening, nipple retraction.  No breast discharge.  Patient does have a family history of breast, endometrial, or ovarian ca: mother     General:  Diet: Reviewed dietary calcium recommendations  Exercise: Reviewed recommendation of 150 minutes of moderate intensity exercise per week  ETOH use: social  Tobacco use: no  Recreational drug use: no    Screening:  Last Pap: 3/8/22 NILM/HPV Neg   Last Mammogram: 25 BI-RADS 1, Negative   Pelvic US 3/7/24 for PMB   Colon: Never, Cologuard 23   STD screening: never.      Review of Systems as per HPI       Objective   /100 (BP Location: Right arm, Patient Position: Sitting, Cuff Size: Adult)   Ht 5' 3.5\" (1.613 m)   Wt 89.3 kg (196 lb 12.8 oz)   LMP 2022 (Exact Date)   BMI 34.31 kg/m²      Physical Exam  Constitutional:       Appearance: Normal " appearance.   Genitourinary:      Vulva and urethral meatus normal.      No vaginal discharge, erythema or bleeding.        Right Adnexa: not tender, not full and no mass present.     Left Adnexa: not tender, not full and no mass present.     No cervical discharge, friability or lesion.      Uterus is not enlarged, fixed or tender.      No uterine mass detected.  Breasts:     Breasts are soft.     Right: Normal. No inverted nipple, mass, nipple discharge, skin change or tenderness.      Left: Normal. No inverted nipple, mass, nipple discharge, skin change or tenderness.   HENT:      Head: Normocephalic.     Cardiovascular:      Rate and Rhythm: Normal rate and regular rhythm.   Pulmonary:      Effort: Pulmonary effort is normal.   Abdominal:      General: There is no distension.      Palpations: Abdomen is soft.      Tenderness: There is no abdominal tenderness.     Musculoskeletal:         General: No swelling.   Lymphadenopathy:      Upper Body:      Right upper body: No axillary adenopathy.      Left upper body: No axillary adenopathy.     Neurological:      General: No focal deficit present.      Mental Status: She is alert and oriented to person, place, and time.     Skin:     General: Skin is warm and dry.     Psychiatric:         Mood and Affect: Mood normal.         Behavior: Behavior normal.   Vitals reviewed.

## 2025-07-02 ENCOUNTER — RESULTS FOLLOW-UP (OUTPATIENT)
Age: 55
End: 2025-07-02

## 2025-07-02 DIAGNOSIS — N95.0 POST-MENOPAUSAL BLEEDING: Primary | ICD-10-CM

## 2025-07-02 LAB
LAB AP GYN PRIMARY INTERPRETATION: ABNORMAL
Lab: ABNORMAL
PATH INTERP SPEC-IMP: ABNORMAL

## 2025-07-02 PROCEDURE — G0124 SCREEN C/V THIN LAYER BY MD: HCPCS | Performed by: PATHOLOGY

## 2025-07-10 ENCOUNTER — HOSPITAL ENCOUNTER (OUTPATIENT)
Dept: RADIOLOGY | Facility: MEDICAL CENTER | Age: 55
Discharge: HOME | End: 2025-07-10
Attending: OBSTETRICS & GYNECOLOGY
Payer: COMMERCIAL

## 2025-07-10 DIAGNOSIS — N95.0 POST-MENOPAUSAL BLEEDING: ICD-10-CM

## 2025-07-10 PROCEDURE — 76856 US EXAM PELVIC COMPLETE: CPT

## 2025-07-10 PROCEDURE — 76830 TRANSVAGINAL US NON-OB: CPT

## 2025-07-16 DIAGNOSIS — E66.01 SEVERE OBESITY (BMI 35.0-39.9) WITH COMORBIDITY (HCC): ICD-10-CM

## 2025-07-17 RX ORDER — TIRZEPATIDE 5 MG/.5ML
5 INJECTION, SOLUTION SUBCUTANEOUS WEEKLY
Qty: 2 ML | Refills: 0 | Status: SHIPPED | OUTPATIENT
Start: 2025-07-17

## 2025-08-08 DIAGNOSIS — E66.01 SEVERE OBESITY (BMI 35.0-39.9) WITH COMORBIDITY (HCC): Primary | ICD-10-CM

## 2025-08-08 RX ORDER — TIRZEPATIDE 7.5 MG/.5ML
7.5 INJECTION, SOLUTION SUBCUTANEOUS WEEKLY
Qty: 2 ML | Refills: 0 | Status: SHIPPED | OUTPATIENT
Start: 2025-08-08

## 2025-08-15 ENCOUNTER — TELEPHONE (OUTPATIENT)
Dept: CARDIOLOGY CLINIC | Facility: CLINIC | Age: 55
End: 2025-08-15